# Patient Record
Sex: FEMALE | Race: WHITE | NOT HISPANIC OR LATINO | URBAN - METROPOLITAN AREA
[De-identification: names, ages, dates, MRNs, and addresses within clinical notes are randomized per-mention and may not be internally consistent; named-entity substitution may affect disease eponyms.]

---

## 2019-12-30 PROBLEM — Z00.00 ENCOUNTER FOR PREVENTIVE HEALTH EXAMINATION: Status: ACTIVE | Noted: 2019-12-30

## 2022-06-13 ENCOUNTER — OUTPATIENT (OUTPATIENT)
Dept: OUTPATIENT SERVICES | Facility: HOSPITAL | Age: 72
LOS: 1 days | Discharge: HOME | End: 2022-06-13
Payer: MEDICARE

## 2022-06-13 ENCOUNTER — RESULT REVIEW (OUTPATIENT)
Age: 72
End: 2022-06-13

## 2022-06-13 DIAGNOSIS — M25.512 PAIN IN LEFT SHOULDER: ICD-10-CM

## 2022-06-13 PROCEDURE — 73030 X-RAY EXAM OF SHOULDER: CPT | Mod: 26,LT

## 2022-06-13 PROCEDURE — 73080 X-RAY EXAM OF ELBOW: CPT | Mod: 26,LT

## 2022-06-17 ENCOUNTER — OUTPATIENT (OUTPATIENT)
Dept: OUTPATIENT SERVICES | Facility: HOSPITAL | Age: 72
LOS: 1 days | Discharge: HOME | End: 2022-06-17
Payer: MEDICARE

## 2022-06-17 ENCOUNTER — RESULT REVIEW (OUTPATIENT)
Age: 72
End: 2022-06-17

## 2022-06-17 DIAGNOSIS — M25.512 PAIN IN LEFT SHOULDER: ICD-10-CM

## 2022-06-17 DIAGNOSIS — S43.015A ANTERIOR DISLOCATION OF LEFT HUMERUS, INITIAL ENCOUNTER: ICD-10-CM

## 2022-06-17 PROCEDURE — 73221 MRI JOINT UPR EXTREM W/O DYE: CPT | Mod: 26,LT,MH

## 2022-07-14 ENCOUNTER — OUTPATIENT (OUTPATIENT)
Dept: OUTPATIENT SERVICES | Facility: HOSPITAL | Age: 72
LOS: 1 days | Discharge: HOME | End: 2022-07-14

## 2022-07-14 ENCOUNTER — RESULT REVIEW (OUTPATIENT)
Age: 72
End: 2022-07-14

## 2022-07-14 DIAGNOSIS — M25.512 PAIN IN LEFT SHOULDER: ICD-10-CM

## 2022-07-14 PROCEDURE — 73030 X-RAY EXAM OF SHOULDER: CPT | Mod: 26,LT

## 2022-07-20 ENCOUNTER — APPOINTMENT (OUTPATIENT)
Dept: ORTHOPEDIC SURGERY | Facility: CLINIC | Age: 72
End: 2022-07-20

## 2022-07-26 ENCOUNTER — APPOINTMENT (OUTPATIENT)
Dept: ORTHOPEDIC SURGERY | Facility: CLINIC | Age: 72
End: 2022-07-26

## 2022-07-26 VITALS — BODY MASS INDEX: 17.75 KG/M2 | WEIGHT: 104 LBS | HEIGHT: 64 IN

## 2022-07-26 PROCEDURE — 99203 OFFICE O/P NEW LOW 30 MIN: CPT

## 2022-07-26 NOTE — IMAGING
[de-identified] : L elbow:\par Tender at the ulna nerve\par Pain with flexion\par +tinels at the ulna nerve\par +hyperflexion test\par \par L hand:\par Intrinsic weakness\par Decreased sensation in the ulna nerve distribution\par

## 2022-07-26 NOTE — HISTORY OF PRESENT ILLNESS
[de-identified] : Patient comes in with complaints of numbness and tingling in the left hand.  She says that the small finger and the ring finger have significant numbness.  She says it is numb nearly all the time.  She had a fall at the beginning of June when she had a fracture dislocation of her shoulder but also hit her elbow.  Since that time she has had the problem.  She is seeing somebody for her shoulder ready.  She just was removed from the sling 2 days ago.  She has been in the sling for almost 2 months. [FreeTextEntry3] : 6/5/2022 [FreeTextEntry5] : [place] city [place] in an injury to her left shoulder, arm & hand

## 2022-07-26 NOTE — ASSESSMENT
[FreeTextEntry1] : The patient was advised of the diagnosis.  The natural history of the pathology was explained in full to the patient in layman's terms. We discussed the nature of the nerve as an electrical cable and what happens to the nerve in cubitaal tunnel syndrome.  We discussed that treatment for night symptoms included night bracing.  We discussed the use of nerve testing in cases when diagnosis was in doubt or for confirmation to exclude alternate pathology.  We discussed that if symptoms were 24/7 surgery was recommended to give the nerve the best chance to recover but that once symptoms were constant, the nerve may not recover even with surgery.  We discussed that if left alone the nerve progression could worsen and that treatment was indicated to prevent progression of nerve compression.  The longer the nerve is left, the more likely to cause worsening irreversible damage.  All questions were answered.  The risks and benefits of surgical and non-surgical treatment alternatives were explained in full to the patient.\par   I would like the patient get an EMG NCV.  When she gets the EMG NCV she will return.  Most likely will move forward with surgical intervention however I would like to see the results.  Patient recently was taking out of her sling so she is going to make some positional modifications as well.

## 2022-07-29 ENCOUNTER — APPOINTMENT (OUTPATIENT)
Dept: NEUROLOGY | Facility: CLINIC | Age: 72
End: 2022-07-29

## 2022-07-29 PROCEDURE — 95912 NRV CNDJ TEST 11-12 STUDIES: CPT

## 2022-07-29 PROCEDURE — 95886 MUSC TEST DONE W/N TEST COMP: CPT

## 2022-08-09 ENCOUNTER — APPOINTMENT (OUTPATIENT)
Dept: ORTHOPEDIC SURGERY | Facility: CLINIC | Age: 72
End: 2022-08-09

## 2022-08-19 ENCOUNTER — APPOINTMENT (OUTPATIENT)
Dept: ORTHOPEDIC SURGERY | Facility: CLINIC | Age: 72
End: 2022-08-19

## 2022-08-19 PROCEDURE — 99214 OFFICE O/P EST MOD 30 MIN: CPT

## 2022-08-25 ENCOUNTER — RESULT REVIEW (OUTPATIENT)
Age: 72
End: 2022-08-25

## 2022-08-25 ENCOUNTER — OUTPATIENT (OUTPATIENT)
Dept: OUTPATIENT SERVICES | Facility: HOSPITAL | Age: 72
LOS: 1 days | Discharge: HOME | End: 2022-08-25

## 2022-08-25 DIAGNOSIS — R06.02 SHORTNESS OF BREATH: ICD-10-CM

## 2022-08-25 DIAGNOSIS — M25.512 PAIN IN LEFT SHOULDER: ICD-10-CM

## 2022-08-25 PROCEDURE — 73030 X-RAY EXAM OF SHOULDER: CPT | Mod: 26,LT

## 2022-08-25 PROCEDURE — 71046 X-RAY EXAM CHEST 2 VIEWS: CPT | Mod: 26

## 2022-08-29 NOTE — HISTORY OF PRESENT ILLNESS
[] : yes [de-identified] : Patient comes back for follow-up of her cubital tunnel symptoms.  She had an EMG NCV.  She still has the same complaints. [FreeTextEntry3] : 6/5/2022 [FreeTextEntry5] : patient [place] the city resulting in an injury to her left shoulder, arm & hand [de-identified] : EMG

## 2022-08-29 NOTE — IMAGING
[de-identified] : L elbow:\par Tender at the ulna nerve\par Pain with flexion\par +tinels at the ulna nerve\par +hyperflexion test\par \par L hand:\par Intrinsic weakness\par Decreased sensation in the ulna nerve distribution\par

## 2022-08-29 NOTE — ASSESSMENT
[FreeTextEntry1] : We discussed the recommendation for cubital tunnel surgery- We reviewed that the goal of surgery is to prevent worsening and give the nerve a chance to recover.  If symptoms are constant there is a chance that the nerve is already too badly damaged and no longer has the potential to recover.  In addition the nerve recovers at the rate of an inch per month so recovery of hand function from compression of a nerve at the elbow can take many months to recover.  Motor end plates may degenerate prior to nerve recovery and therefore strength and atrophy may never recover.  Risks, benefits, and alternatives of surgery discussed with patient (and family). Risks including but not limited to infection, blood loss, tendon damage, muscle damage, nerve damage, stiffness, pain, no resolution of symptoms, CRPS, loss of function, potential for secondary surgery, loss of limb or life. Patient understands and was allowed to voice questions or concerns. They agree to surgery\par

## 2022-09-12 ENCOUNTER — APPOINTMENT (OUTPATIENT)
Dept: ORTHOPEDIC SURGERY | Facility: AMBULATORY SURGERY CENTER | Age: 72
End: 2022-09-12

## 2022-09-12 PROCEDURE — 15736 MUSCLE-SKIN GRAFT ARM: CPT | Mod: LT

## 2022-09-12 PROCEDURE — 64718 REVISE ULNAR NERVE AT ELBOW: CPT | Mod: LT

## 2022-09-21 ENCOUNTER — APPOINTMENT (OUTPATIENT)
Dept: PEDIATRIC ALLERGY IMMUNOLOGY | Facility: CLINIC | Age: 72
End: 2022-09-21

## 2022-09-21 VITALS — HEIGHT: 64 IN | WEIGHT: 104 LBS | BODY MASS INDEX: 17.75 KG/M2

## 2022-09-21 PROCEDURE — 95004 PERQ TESTS W/ALRGNC XTRCS: CPT

## 2022-09-21 PROCEDURE — 99203 OFFICE O/P NEW LOW 30 MIN: CPT | Mod: 25

## 2022-09-21 NOTE — SOCIAL HISTORY
[Apartment] : [unfilled] lives in an apartment  [Radiator/Baseboard] : heating provided by radiator(s)/baseboard(s) [Window Units] : air conditioning provided by window units [Dry] : dry [None] : none [Humidifier] : does not use a humidifier [Dehumidifier] : does not use a dehumidifier [Cockroaches] : Patient states that there are no cockroaches in the home [Dust Mite Covers] : does not have dust mite covers [Feather Pillows] : does not have feather pillows [Feather Comforter] : does not have a feather comforter [Bedroom] : not in the bedroom [Basement] : not in the basement [Living Area] : not in the living area [Smokers in Household] : there are no smokers in the home

## 2022-09-21 NOTE — HISTORY OF PRESENT ILLNESS
[de-identified] : RHONDA WOODRUFF is a 72 year  old female with a history of mild sporadic asthma in childhood. SHe used to have albuterol and no seasonal allergies at that point. At age 31, after she had her first child she developed upper respiratory allergies in the spring and fall and some exercised induced shortness of breath. It is notable she has never taken any antihistamine regularly.  Several years later she had an allergy workup and she went on allergy shots for 15 years. She got better and immunotherapy was discontinued by her mid 40s. Five to six years later in her 50s she went to Dr. Celeste and she was evaluated and began immunotherapy for various environmental allergens again. In the last 2-3 years her immunotherapy is a bit irregular since the pandemic began, she was going every 3 weeks up until 2 and half months ago.\par \par She also has sensitivity to chemicals and she almost had a systemic reaction. And she has an EpiPen she carries with her. She gets lip swelling with papaya and cantaloupe. She gets tongue swelling with Advil, and hives with penicillin and sulfur based drugs. Her EpiPen expires in November.\par \par She is here for evaluation of her allergy status.

## 2022-09-21 NOTE — PHYSICAL EXAM
[Alert] : alert [Well Nourished] : well nourished [Healthy Appearance] : healthy appearance [No Acute Distress] : no acute distress [Well Developed] : well developed [Normal Pupil & Iris Size/Symmetry] : normal pupil and iris size and symmetry [No Discharge] : no discharge [No Photophobia] : no photophobia [Sclera Not Icteric] : sclera not icteric [Normal TMs] : both tympanic membranes were normal [Normal Nasal Mucosa] : the nasal mucosa was normal [Normal Lips/Tongue] : the lips and tongue were normal [Normal Outer Ear/Nose] : the ears and nose were normal in appearance [Normal Tonsils] : normal tonsils [No Thrush] : no thrush [Pale mucosa] : pale mucosa [Boggy Nasal Turbinates] : boggy and/or pale nasal turbinates [Clear Rhinorrhea] : clear rhinorrhea was seen [Supple] : the neck was supple [Normal Rate and Effort] : normal respiratory rhythm and effort [No Crackles] : no crackles [No Retractions] : no retractions [Bilateral Audible Breath Sounds] : bilateral audible breath sounds [Normal Rate] : heart rate was normal  [Normal S1, S2] : normal S1 and S2 [No murmur] : no murmur [Regular Rhythm] : with a regular rhythm [Soft] : abdomen soft [Not Tender] : non-tender [Not Distended] : not distended [No HSM] : no hepato-splenomegaly [Normal Cervical Lymph Nodes] : cervical [Skin Intact] : skin intact  [No Rash] : no rash [No Skin Lesions] : no skin lesions [No clubbing] : no clubbing [No Edema] : no edema [No Cyanosis] : no cyanosis [Normal Mood] : mood was normal [Normal Affect] : affect was normal [Alert, Awake, Oriented as Age-Appropriate] : alert, awake, oriented as age appropriate

## 2022-09-21 NOTE — ASSESSMENT
[FreeTextEntry1] : The patient is a 72 year old female with a decades long history of Allergic Rhinitis. Allergy skin testing showed she still had various positive reactions to environmental allergens. I have suggested she implement environmental control measures including HEPA air purifier and dust mite proof encasings. I have suggested symptomatic treatment with OTC Zyrtec or Allegra. I explained to the patient that if her symptoms get much worse we will consider reintroducing immunotherapy at a later date. I will see her in April or as needed.

## 2022-09-23 ENCOUNTER — APPOINTMENT (OUTPATIENT)
Dept: ORTHOPEDIC SURGERY | Facility: CLINIC | Age: 72
End: 2022-09-23

## 2022-09-23 PROCEDURE — 99024 POSTOP FOLLOW-UP VISIT: CPT

## 2022-09-23 NOTE — HISTORY OF PRESENT ILLNESS
[] : Post Surgical Visit: yes [de-identified] : Patient comes in status post left cubital tunnel release with anterior transposition in adipose fascial fat flap.  She says she has some stiffness in the arm she has some swelling in the hand she has some discomfort and some pain.  The pain is okay right now.  The pain is improving.  She still has the same numbness she had prior to surgery. [de-identified] : 09/12/2022 [de-identified] : left cubital tunnel release

## 2022-09-23 NOTE — ASSESSMENT
[FreeTextEntry1] :  status post cubital tunnel release.  The patient is going to start with scar massage.  She is not going to do excessive bending of the elbow nor she going to lift anything heavy.  She is going to continue with therapy for her shoulder.  I discussed with her can take at least a year for the sensation to improving may never fully improved but purpose of the surgery was to stop it from getting worse to stop her from having atrophy.  The patient understood everything thoroughly.  I will see her in 3 weeks.

## 2022-09-23 NOTE — IMAGING
[de-identified] :   Incision site clean dry and intact, mild ecchymosis, sutures removed, full range of motion of fingers, neurovascular status unchanged

## 2022-10-14 ENCOUNTER — APPOINTMENT (OUTPATIENT)
Dept: ORTHOPEDIC SURGERY | Facility: CLINIC | Age: 72
End: 2022-10-14

## 2022-10-14 DIAGNOSIS — T78.2XXA ANAPHYLACTIC SHOCK, UNSPECIFIED, INITIAL ENCOUNTER: ICD-10-CM

## 2022-10-14 PROCEDURE — 99024 POSTOP FOLLOW-UP VISIT: CPT

## 2022-10-14 NOTE — REASON FOR VISIT
[FreeTextEntry2] : PO 9/12/22 status post left cubital tunnel release with anterior transposition in adipose fascial fat flap

## 2022-10-14 NOTE — PHYSICAL EXAM
[de-identified] :   Incision site well healed, slight sensitivity by the incision site, good range of motion of elbow, neurovascular status improving

## 2022-10-14 NOTE — HISTORY OF PRESENT ILLNESS
[de-identified] : Patient comes in status post left cubital tunnel release with anterior transposition in adipose fascial fat flap.  She says her pain is significantly better than prior.  She notices that sometimes she has more swelling in the hand than others.  She has some sensitivity by the incision site.  The numbness has improved but is still there.  She is currently doing therapy on her shoulder. [] : Post Surgical Visit: yes [de-identified] : 09/12/2022 [de-identified] : left cubital tunnel release

## 2022-10-14 NOTE — IMAGING
[de-identified] :   Incision site clean dry and intact, mild ecchymosis, sutures removed, full range of motion of fingers, neurovascular status unchanged

## 2022-10-14 NOTE — ASSESSMENT
[FreeTextEntry1] :  patient is status post cubital tunnel release.  I am recommending that she go to therapy.  She did have an anterior transposition with a fat flap.  I am recommending she go to occupational therapist to work on nerve gliding, scar desensitization and massage, intrinsic exercises of her hand in strengthening.  I do not recommend she lifts anything heavy for the next month.  She may continue with therapy for the shoulder.  I will see her in about 6 weeks.

## 2022-11-29 ENCOUNTER — APPOINTMENT (OUTPATIENT)
Dept: ORTHOPEDIC SURGERY | Facility: CLINIC | Age: 72
End: 2022-11-29

## 2022-11-29 PROCEDURE — 99024 POSTOP FOLLOW-UP VISIT: CPT

## 2022-11-29 NOTE — ASSESSMENT
[FreeTextEntry1] :  patient is status post cubital tunnel release.  I am recommending that she go to therapy.   I did discuss with the patient that she should be doing desensitization and nerve gliding exercises in addition to intrinsic lb strengthening.  I gave a new prescription for therapy.  Hopefully this will help therapist guide her little bit better.  As for lifting heavy things, I discussed with her not lifting anything too heavy, she has not been lifting anything much at all and has just tried 1 or 2 lb weights.  Her physical therapist asked specifically what she can do but did not bring a protocol this time so that I could tell her what was something that was not okay.  I recommended that she have her therapist give me a call or send over the specific so that I can let her know what would be okay or not okay.  She will  Follow up when she is back from her vacation at the end of January.

## 2022-11-29 NOTE — HISTORY OF PRESENT ILLNESS
[de-identified] : Patient comes in status post left cubital tunnel release with anterior transposition in adipose fascial fat flap.  She says she still having sensitivity at the elbow.  She still has numbness in the hand.  She has been working with therapy but have not been doing nerve gliding exercises nor desensitization.  They have been doing specific hand movements and tasks. [] : Post Surgical Visit: yes [de-identified] : 09/12/2022 [de-identified] : left cubital tunnel release

## 2022-11-29 NOTE — REASON FOR VISIT
[FreeTextEntry2] : PO 9/12/22 status post left cubital tunnel release with anterior transposition in adipose fascial fat flap.

## 2022-11-29 NOTE — PHYSICAL EXAM
[de-identified] :   Incision site well healed, slight sensitivity by the incision site, good range of motion of elbow, neurovascular status improving

## 2022-11-29 NOTE — IMAGING
[de-identified] :   Incision site clean dry and intact, mild ecchymosis, sutures removed, full range of motion of fingers, neurovascular status unchanged

## 2023-01-30 ENCOUNTER — APPOINTMENT (OUTPATIENT)
Dept: ORTHOPEDIC SURGERY | Facility: CLINIC | Age: 73
End: 2023-01-30
Payer: MEDICARE

## 2023-01-30 PROCEDURE — 99213 OFFICE O/P EST LOW 20 MIN: CPT

## 2023-01-30 NOTE — IMAGING
[de-identified] :   Incision site   Well-healed, full range of motion of fingers, wrist and elbow, neurovascular status unchanged

## 2023-01-30 NOTE — HISTORY OF PRESENT ILLNESS
[] : Post Surgical Visit: yes [de-identified] : Patient comes in status post left cubital tunnel release with anterior transposition in adipose fascial fat flap. She still has numbness in the hand.  She says she still having sensitivity at the elbow. She does not have any other complaints.She is still going to therapy. She says that she does not have any more pain.\par  [de-identified] : 09/12/2022 [de-identified] : left cubital tunnel release

## 2023-01-30 NOTE — PHYSICAL EXAM
[de-identified] :   Incision site well healed, slight sensitivity by the incision site, good range of motion of elbow, neurovascular status improving

## 2023-01-30 NOTE — ASSESSMENT
[FreeTextEntry1] :  patient is status post cubital tunnel release.  the patient has not done therapy in some time.  She still has some hypersensitivity by the elbow.  She says she does not have any more pain.  She will continue with therapy.  She will follow up in about 2-3 months.  We have time to see if the sensation will or will not return.

## 2023-03-22 ENCOUNTER — APPOINTMENT (OUTPATIENT)
Dept: ORTHOPEDIC SURGERY | Facility: CLINIC | Age: 73
End: 2023-03-22
Payer: MEDICARE

## 2023-03-22 DIAGNOSIS — G56.22 LESION OF ULNAR NERVE, LEFT UPPER LIMB: ICD-10-CM

## 2023-03-22 PROCEDURE — 99213 OFFICE O/P EST LOW 20 MIN: CPT

## 2023-03-22 NOTE — ASSESSMENT
[FreeTextEntry1] :  patient is status post cubital tunnel release.  She says she does not have any more pain.  She will continue with therapy for her shoulder but in terms of her elbow and hand is doing everything she needs.  We have time to see if the sensation will or will not return. She will let pain be her guide regarding therapy for her shoulder.  If she has a problem with her elbow she will let me know.

## 2023-03-22 NOTE — HISTORY OF PRESENT ILLNESS
[] : Post Surgical Visit: yes [de-identified] : Patient comes in status post left cubital tunnel release with anterior transposition in adipose fascial fat flap. She still has numbness in the hand.  She says the sensitivity comes and goes at the elbow but it is better than prior.  She says sometimes when she lifts things she does have some pain but most of the pain is in the shoulder.  She is currently being treated for a proximal humerus fracture rotator cuff tears and a frozen shoulder.  She has stopped doing therapy and doing things on her own. [de-identified] : 09/12/2022 [de-identified] : left cubital tunnel release

## 2023-03-22 NOTE — PHYSICAL EXAM
[de-identified] :   Incision site well healed, no sensitivity by the incision site, good range of motion of elbow, neurovascular status improved but no change or improvement since last visit

## 2023-04-05 ENCOUNTER — APPOINTMENT (OUTPATIENT)
Dept: PEDIATRIC ALLERGY IMMUNOLOGY | Facility: CLINIC | Age: 73
End: 2023-04-05
Payer: MEDICARE

## 2023-04-05 PROCEDURE — 99213 OFFICE O/P EST LOW 20 MIN: CPT

## 2023-04-05 NOTE — ASSESSMENT
[FreeTextEntry1] : The patient is a 72 year old female with a decades long history of Allergic Rhinitis. Doing very well. I have suggested she continue symptomatic treatment with OTC Zyrtec or Allegra.  I will see her in 3 months or as needed.

## 2023-04-05 NOTE — HISTORY OF PRESENT ILLNESS
[de-identified] : RHONDA WOODRUFF is a 72 year  old female with a history of mild sporadic asthma in childhood. SHe used to have albuterol and no seasonal allergies at that point. At age 31, after she had her first child she developed upper respiratory allergies in the spring and fall and some exercised induced shortness of breath. It is notable she has never taken any antihistamine regularly.  Several years later she had an allergy workup and she went on allergy shots for 15 years. She got better and immunotherapy was discontinued by her mid 40s. Five to six years later in her 50s she went to Dr. Celeste and she was evaluated and began immunotherapy for various environmental allergens again. In the last 2-3 years her immunotherapy is a bit irregular since the pandemic began, she was going every 3 weeks up until 2 and half months ago.\par \par She also has sensitivity to chemicals and she almost had a systemic reaction. And she has an EpiPen she carries with her. She gets lip swelling with papaya and cantaloupe. She gets tongue swelling with Advil, and hives with penicillin and sulfur based drugs. Her EpiPen expires in November.\par \par Allergy skin testing showed she still had various positive reactions to environmental allergens. I had suggested she implement environmental control measures including HEPA air purifier and dust mite proof encasings. I had suggested symptomatic treatment with OTC Zyrtec or Allegra. I explained to the patient that if her symptoms get much worse we will consider reintroducing immunotherapy at a later date.

## 2023-05-31 ENCOUNTER — APPOINTMENT (OUTPATIENT)
Dept: PEDIATRIC ALLERGY IMMUNOLOGY | Facility: CLINIC | Age: 73
End: 2023-05-31
Payer: MEDICARE

## 2023-05-31 VITALS
SYSTOLIC BLOOD PRESSURE: 100 MMHG | WEIGHT: 104 LBS | HEIGHT: 64 IN | DIASTOLIC BLOOD PRESSURE: 70 MMHG | BODY MASS INDEX: 17.75 KG/M2

## 2023-05-31 PROCEDURE — 99213 OFFICE O/P EST LOW 20 MIN: CPT

## 2023-05-31 NOTE — HISTORY OF PRESENT ILLNESS
[None] : The patient is currently asymptomatic [de-identified] : RHONDA WOODRUFF is a 73 year  old female with a history of mild sporadic asthma in childhood. SHe used to have albuterol and no seasonal allergies at that point. At age 31, after she had her first child she developed upper respiratory allergies in the spring and fall and some exercised induced shortness of breath. It is notable she has never taken any antihistamine regularly.  Several years later she had an allergy workup and she went on allergy shots for 15 years. She got better and immunotherapy was discontinued by her mid 40s. Five to six years later in her 50s she went to Dr. Celeste and she was evaluated and began immunotherapy for various environmental allergens again. In the last 2-3 years her immunotherapy is a bit irregular since the pandemic began, she was going every 3 weeks up until 2 and half months ago.\par \par She also has sensitivity to chemicals and she almost had a systemic reaction. And she has an EpiPen she carries with her. She gets lip swelling with papaya and cantaloupe. She gets tongue swelling with Advil, and hives with penicillin and sulfur based drugs. Her EpiPen expires in November.\par \par Allergy skin testing showed she still had various positive reactions to environmental allergens. I had suggested she implement environmental control measures including HEPA air purifier and dust mite proof encasings. I had suggested symptomatic treatment with OTC Zyrtec or Allegra. I explained to the patient that if her symptoms get much worse we will consider reintroducing immunotherapy at a later date.\par \par She has been doing well with some sneezing and runny nose in the morning.

## 2023-11-15 ENCOUNTER — APPOINTMENT (OUTPATIENT)
Dept: PEDIATRIC ALLERGY IMMUNOLOGY | Facility: CLINIC | Age: 73
End: 2023-11-15
Payer: MEDICARE

## 2023-11-15 PROCEDURE — 99213 OFFICE O/P EST LOW 20 MIN: CPT

## 2023-12-27 ENCOUNTER — APPOINTMENT (OUTPATIENT)
Dept: ORTHOPEDIC SURGERY | Facility: CLINIC | Age: 73
End: 2023-12-27
Payer: MEDICARE

## 2023-12-27 VITALS — BODY MASS INDEX: 18.44 KG/M2 | HEIGHT: 64 IN | WEIGHT: 108 LBS

## 2023-12-27 DIAGNOSIS — S61.219A LACERATION W/OUT FOREIGN BODY OF UNSPECIFIED FINGER W/OUT DAMAGE TO NAIL, INITIAL ENCOUNTER: ICD-10-CM

## 2023-12-27 PROCEDURE — 99213 OFFICE O/P EST LOW 20 MIN: CPT

## 2023-12-27 NOTE — DISCUSSION/SUMMARY
[de-identified] : Impression: Laceration to the left middle finger with mild soft tissue swelling.  Plan:  Patient was advised for leilani taping to promote range of motion to the digit. Patient was advised to keep the area clean and dry. Patient was advised to follow-up in 1 week for repeat evaluation.  Follow-up:

## 2023-12-27 NOTE — IMAGING
[de-identified] : On examination of the the left middle finger, patient has a small superficial laceration over the proximal phalanx, there is no significant redness surrounding the laceration. There is no warmth to palpation, patient has generalized swelling over the proximal phalanx over the soft tissue. Patient is able to flex the MCP and PIP joint, she may have some stiffness possibly due to swelling. Sensation to light touch intact.

## 2023-12-27 NOTE — HISTORY OF PRESENT ILLNESS
[de-identified] : 73-year-old female here for an evaluation with stent to the left middle finger. Patient states that the senior happened on December 25, 2023, patient states that she Cut with a piece of glass, at the time of the injury she applied peroxide and bacitracin and after that she tried to squeeze her finger to see if any glass inside her cut. NothingPatient states that the following day she woke up with swelling in her finger and difficulty bending her finger. Patient is afraid of any nerve or tendon damage

## 2024-01-03 ENCOUNTER — APPOINTMENT (OUTPATIENT)
Dept: ORTHOPEDIC SURGERY | Facility: CLINIC | Age: 74
End: 2024-01-03

## 2024-01-16 ENCOUNTER — APPOINTMENT (OUTPATIENT)
Dept: ORTHOPEDIC SURGERY | Facility: CLINIC | Age: 74
End: 2024-01-16

## 2024-03-05 ENCOUNTER — APPOINTMENT (OUTPATIENT)
Dept: ORTHOPEDIC SURGERY | Facility: CLINIC | Age: 74
End: 2024-03-05
Payer: MEDICARE

## 2024-03-05 DIAGNOSIS — S69.82XA OTHER SPECIFIED INJURIES OF LEFT WRIST, HAND AND FINGER(S), INITIAL ENCOUNTER: ICD-10-CM

## 2024-03-05 PROCEDURE — L3908: CPT | Mod: KX,LT

## 2024-03-05 PROCEDURE — 73110 X-RAY EXAM OF WRIST: CPT | Mod: LT

## 2024-03-05 PROCEDURE — 99214 OFFICE O/P EST MOD 30 MIN: CPT

## 2024-03-05 NOTE — ASSESSMENT
[FreeTextEntry1] : The patient comes in with pain and swelling in her left wrist. The patient states about 2 months ago she woke up with swelling. The patient states she thinks she lifted something heavy the day prior, but she is not very sure. The patient states she has pain when she is making her bed.   R wrist: Swelling, volar wrist, tender palpation over ECU, pain with resisted flexion of the wrist, full range of motion of wrist, minimally tender palpation along basal joint, neurovascular intact  X-RAY left wrist 3 views   We reviewed the anatomy of the 6th dorsal extensor compartment and pathology of ECU tendonitis. We discussed the treatment options including splinting/nsaids, injection and surgery. The patient was given a cock-up brace to wear. The patient will follow up in a month.

## 2024-03-07 PROBLEM — S69.82XA TFCC (TRIANGULAR FIBROCARTILAGE COMPLEX) INJURY, LEFT, INITIAL ENCOUNTER: Status: ACTIVE | Noted: 2024-03-07

## 2024-04-03 ENCOUNTER — APPOINTMENT (OUTPATIENT)
Dept: ORTHOPEDIC SURGERY | Facility: CLINIC | Age: 74
End: 2024-04-03

## 2024-05-20 ENCOUNTER — OUTPATIENT (OUTPATIENT)
Dept: OUTPATIENT SERVICES | Facility: HOSPITAL | Age: 74
LOS: 1 days | End: 2024-05-20
Payer: MEDICARE

## 2024-05-20 VITALS
HEIGHT: 64 IN | RESPIRATION RATE: 16 BRPM | OXYGEN SATURATION: 100 % | TEMPERATURE: 98 F | HEART RATE: 60 BPM | DIASTOLIC BLOOD PRESSURE: 77 MMHG | SYSTOLIC BLOOD PRESSURE: 130 MMHG | WEIGHT: 106.04 LBS

## 2024-05-20 DIAGNOSIS — M20.41 OTHER HAMMER TOE(S) (ACQUIRED), RIGHT FOOT: ICD-10-CM

## 2024-05-20 DIAGNOSIS — Z01.818 ENCOUNTER FOR OTHER PREPROCEDURAL EXAMINATION: ICD-10-CM

## 2024-05-20 DIAGNOSIS — S42.402A UNSPECIFIED FRACTURE OF LOWER END OF LEFT HUMERUS, INITIAL ENCOUNTER FOR CLOSED FRACTURE: Chronic | ICD-10-CM

## 2024-05-20 LAB
ALBUMIN SERPL ELPH-MCNC: 4.5 G/DL — SIGNIFICANT CHANGE UP (ref 3.5–5.2)
ALP SERPL-CCNC: 128 U/L — HIGH (ref 30–115)
ALT FLD-CCNC: 17 U/L — SIGNIFICANT CHANGE UP (ref 0–41)
ANION GAP SERPL CALC-SCNC: 12 MMOL/L — SIGNIFICANT CHANGE UP (ref 7–14)
AST SERPL-CCNC: 23 U/L — SIGNIFICANT CHANGE UP (ref 0–41)
BASOPHILS # BLD AUTO: 0.03 K/UL — SIGNIFICANT CHANGE UP (ref 0–0.2)
BASOPHILS NFR BLD AUTO: 0.7 % — SIGNIFICANT CHANGE UP (ref 0–1)
BILIRUB SERPL-MCNC: 0.3 MG/DL — SIGNIFICANT CHANGE UP (ref 0.2–1.2)
BUN SERPL-MCNC: 17 MG/DL — SIGNIFICANT CHANGE UP (ref 10–20)
CALCIUM SERPL-MCNC: 9.5 MG/DL — SIGNIFICANT CHANGE UP (ref 8.4–10.5)
CHLORIDE SERPL-SCNC: 97 MMOL/L — LOW (ref 98–110)
CO2 SERPL-SCNC: 26 MMOL/L — SIGNIFICANT CHANGE UP (ref 17–32)
CREAT SERPL-MCNC: 0.8 MG/DL — SIGNIFICANT CHANGE UP (ref 0.7–1.5)
EGFR: 78 ML/MIN/1.73M2 — SIGNIFICANT CHANGE UP
EOSINOPHIL # BLD AUTO: 0.1 K/UL — SIGNIFICANT CHANGE UP (ref 0–0.7)
EOSINOPHIL NFR BLD AUTO: 2.3 % — SIGNIFICANT CHANGE UP (ref 0–8)
GLUCOSE SERPL-MCNC: 79 MG/DL — SIGNIFICANT CHANGE UP (ref 70–99)
HCT VFR BLD CALC: 36 % — LOW (ref 37–47)
HGB BLD-MCNC: 11.9 G/DL — LOW (ref 12–16)
IMM GRANULOCYTES NFR BLD AUTO: 0.2 % — SIGNIFICANT CHANGE UP (ref 0.1–0.3)
LYMPHOCYTES # BLD AUTO: 0.77 K/UL — LOW (ref 1.2–3.4)
LYMPHOCYTES # BLD AUTO: 18.1 % — LOW (ref 20.5–51.1)
MCHC RBC-ENTMCNC: 26.4 PG — LOW (ref 27–31)
MCHC RBC-ENTMCNC: 33.1 G/DL — SIGNIFICANT CHANGE UP (ref 32–37)
MCV RBC AUTO: 80 FL — LOW (ref 81–99)
MONOCYTES # BLD AUTO: 0.57 K/UL — SIGNIFICANT CHANGE UP (ref 0.1–0.6)
MONOCYTES NFR BLD AUTO: 13.4 % — HIGH (ref 1.7–9.3)
NEUTROPHILS # BLD AUTO: 2.78 K/UL — SIGNIFICANT CHANGE UP (ref 1.4–6.5)
NEUTROPHILS NFR BLD AUTO: 65.3 % — SIGNIFICANT CHANGE UP (ref 42.2–75.2)
NRBC # BLD: 0 /100 WBCS — SIGNIFICANT CHANGE UP (ref 0–0)
PLATELET # BLD AUTO: 166 K/UL — SIGNIFICANT CHANGE UP (ref 130–400)
PMV BLD: 11.8 FL — HIGH (ref 7.4–10.4)
POTASSIUM SERPL-MCNC: 4.7 MMOL/L — SIGNIFICANT CHANGE UP (ref 3.5–5)
POTASSIUM SERPL-SCNC: 4.7 MMOL/L — SIGNIFICANT CHANGE UP (ref 3.5–5)
PROT SERPL-MCNC: 6.9 G/DL — SIGNIFICANT CHANGE UP (ref 6–8)
RBC # BLD: 4.5 M/UL — SIGNIFICANT CHANGE UP (ref 4.2–5.4)
RBC # FLD: 13.9 % — SIGNIFICANT CHANGE UP (ref 11.5–14.5)
SODIUM SERPL-SCNC: 135 MMOL/L — SIGNIFICANT CHANGE UP (ref 135–146)
WBC # BLD: 4.26 K/UL — LOW (ref 4.8–10.8)
WBC # FLD AUTO: 4.26 K/UL — LOW (ref 4.8–10.8)

## 2024-05-20 PROCEDURE — 80053 COMPREHEN METABOLIC PANEL: CPT

## 2024-05-20 PROCEDURE — 99214 OFFICE O/P EST MOD 30 MIN: CPT | Mod: 25

## 2024-05-20 PROCEDURE — 36415 COLL VENOUS BLD VENIPUNCTURE: CPT

## 2024-05-20 PROCEDURE — 93005 ELECTROCARDIOGRAM TRACING: CPT

## 2024-05-20 PROCEDURE — 93010 ELECTROCARDIOGRAM REPORT: CPT

## 2024-05-20 PROCEDURE — 85025 COMPLETE CBC W/AUTO DIFF WBC: CPT

## 2024-05-20 NOTE — H&P PST ADULT - NSANTHOSAYNRD_GEN_A_CORE
No. SCHUYLER screening performed.  STOP BANG Legend: 0-2 = LOW Risk; 3-4 = INTERMEDIATE Risk; 5-8 = HIGH Risk

## 2024-05-20 NOTE — H&P PST ADULT - NSICDXPASTMEDICALHX_GEN_ALL_CORE_FT
PAST MEDICAL HISTORY:  Dry eyes     Multiple closed and open fractures     Osteoporosis     Retinal tear, left     Toe fracture, right

## 2024-05-20 NOTE — H&P PST ADULT - REASON FOR ADMISSION
72 y/o female presents for PAST in preparation for Right second toe arthroplasty on 6/7/2024 [place] [facility] [place]. Devi.

## 2024-05-20 NOTE — H&P PST ADULT - NS MD HP INPLANTS MED DEV
Patient is requesting medication refill. Please approve or deny this request.    Rx requested:  Requested Prescriptions     Pending Prescriptions Disp Refills    oxyCODONE-acetaminophen (PERCOCET) 5-325 MG per tablet 60 tablet 0     Sig: Take 1 tablet by mouth 2 times daily for 30 days.          Last Office Visit:   6/17/2022      Next Visit Date:  Future Appointments   Date Time Provider Manan Honeycutt   2/3/2023  9:30 AM Merissa Camacho  [facility] -
None

## 2024-05-20 NOTE — H&P PST ADULT - HISTORY OF PRESENT ILLNESS
72 y/o female with a one year history of R toe fracture after fall from ladder,  experiences pain with closed shoes . Now scheduled for ight second toe arthroplasty on 6/7/2024 [facility] [place]. Devi.    Denies any chest pain, difficulty breathing, SOB, palpitations, dysuria, URI, or any other infections in the last 2 weeks/1 month. Denies any recent travel, contact, or exposure to any persons with known or suspected COVID-19. Pt also denies COVID testing within the last 2 weeks. Pt admits to receiving all doses of  COVID vaccine. Denies any suicidal or homicidal ideations. Pt advised to self quarantine until day of procedure. Exercise tolerance, does siva 4-5 times per week (1 hour) and is an active hiker, without dyspnea. Reviewed with patient. Pt verbalized understanding of all pre-operative instructions.    Anesthesia Alert  NO--Difficult Airway CLASS II  NO--History of neck surgery or radiation  NO--Limited ROM of neck  NO--History of Malignant hyperthermia  NO--No personal or family history of Pseudocholinesterase deficiency.  YES--Prior Anesthesia Complication Nausea/Vomiting  YES--Latex Allergy  NO--Loose teeth  NO--History of Rheumatoid Arthritis  NO--SCHUYLER  NO--Bleeding Risk  NO--Other_____    Revised Cardiac Risk Index for Pre-Operative Risk from [employer]  on 5/20/2024  ** All calculations should be rechecked by clinician prior to use **    RESULT SUMMARY:  0 points  Class I Risk    3.9 %  30-day risk of death, MI, or cardiac arrest    From Duceppe 2017. These numbers are higher than those from the original study (Pablo 1999). See Evidence for details.      INPUTS:  Elevated-risk surgery —> 0 = No  History of ischemic heart disease —> 0 = No  History of congestive heart failure —> 0 = No  History of cerebrovascular disease —> 0 = No  Pre-operative treatment with insulin —> 0 = No  Pre-operative creatinine >2 mg/dL / 176.8 µmol/L —> 0 = No    Duke Activity Status Index (DASI) from [employer]  on 5/20/2024  ** All calculations should be rechecked by clinician prior to use **    RESULT SUMMARY:  42.7 points  The higher the score (maximum 58.2), the higher the functional status.    7.99 METs        INPUTS:  Take care of self —> 2.75 = Yes  Walk indoors —> 1.75 = Yes  Walk 1&ndash;2 blocks on level ground —> 2.75 = Yes  Climb a flight of stairs or walk up a hill —> 5.5 = Yes  Run a short distance —> 8 = Yes  Do light work around the house —> 2.7 = Yes  Do moderate work around the house —> 3.5 = Yes  Do heavy work around the house —> 0 = No  Do yardwork —> 4.5 = Yes  Have sexual relations —> 5.25 = Yes  Participate in moderate recreational activities —> 6 = Yes  Participate in strenuous sports —> 0 = No

## 2024-05-21 DIAGNOSIS — Z01.818 ENCOUNTER FOR OTHER PREPROCEDURAL EXAMINATION: ICD-10-CM

## 2024-05-21 DIAGNOSIS — M20.41 OTHER HAMMER TOE(S) (ACQUIRED), RIGHT FOOT: ICD-10-CM

## 2024-06-07 ENCOUNTER — TRANSCRIPTION ENCOUNTER (OUTPATIENT)
Age: 74
End: 2024-06-07

## 2024-06-07 ENCOUNTER — RESULT REVIEW (OUTPATIENT)
Age: 74
End: 2024-06-07

## 2024-06-07 ENCOUNTER — OUTPATIENT (OUTPATIENT)
Dept: OUTPATIENT SERVICES | Facility: HOSPITAL | Age: 74
LOS: 1 days | Discharge: HOME | End: 2024-06-07
Payer: MEDICARE

## 2024-06-07 VITALS
SYSTOLIC BLOOD PRESSURE: 131 MMHG | HEIGHT: 64 IN | DIASTOLIC BLOOD PRESSURE: 79 MMHG | OXYGEN SATURATION: 100 % | TEMPERATURE: 98 F | RESPIRATION RATE: 17 BRPM | WEIGHT: 106.92 LBS | HEART RATE: 62 BPM

## 2024-06-07 VITALS — RESPIRATION RATE: 17 BRPM | HEART RATE: 60 BPM

## 2024-06-07 DIAGNOSIS — M20.41 OTHER HAMMER TOE(S) (ACQUIRED), RIGHT FOOT: ICD-10-CM

## 2024-06-07 DIAGNOSIS — Z88.0 ALLERGY STATUS TO PENICILLIN: ICD-10-CM

## 2024-06-07 DIAGNOSIS — S42.402A UNSPECIFIED FRACTURE OF LOWER END OF LEFT HUMERUS, INITIAL ENCOUNTER FOR CLOSED FRACTURE: Chronic | ICD-10-CM

## 2024-06-07 DIAGNOSIS — M81.0 AGE-RELATED OSTEOPOROSIS WITHOUT CURRENT PATHOLOGICAL FRACTURE: ICD-10-CM

## 2024-06-07 DIAGNOSIS — H04.129 DRY EYE SYNDROME OF UNSPECIFIED LACRIMAL GLAND: ICD-10-CM

## 2024-06-07 DIAGNOSIS — Z88.5 ALLERGY STATUS TO NARCOTIC AGENT: ICD-10-CM

## 2024-06-07 DIAGNOSIS — Z91.040 LATEX ALLERGY STATUS: ICD-10-CM

## 2024-06-07 DIAGNOSIS — Z88.2 ALLERGY STATUS TO SULFONAMIDES: ICD-10-CM

## 2024-06-07 PROCEDURE — 73630 X-RAY EXAM OF FOOT: CPT | Mod: RT

## 2024-06-07 PROCEDURE — 88304 TISSUE EXAM BY PATHOLOGIST: CPT

## 2024-06-07 PROCEDURE — 73630 X-RAY EXAM OF FOOT: CPT | Mod: 26,RT

## 2024-06-07 PROCEDURE — 88311 DECALCIFY TISSUE: CPT | Mod: 26

## 2024-06-07 PROCEDURE — 88311 DECALCIFY TISSUE: CPT

## 2024-06-07 PROCEDURE — 88304 TISSUE EXAM BY PATHOLOGIST: CPT | Mod: 26

## 2024-06-07 RX ORDER — SODIUM CHLORIDE 9 MG/ML
1000 INJECTION, SOLUTION INTRAVENOUS
Refills: 0 | Status: DISCONTINUED | OUTPATIENT
Start: 2024-06-07 | End: 2024-06-07

## 2024-06-07 RX ORDER — ONDANSETRON 8 MG/1
4 TABLET, FILM COATED ORAL ONCE
Refills: 0 | Status: DISCONTINUED | OUTPATIENT
Start: 2024-06-07 | End: 2024-06-07

## 2024-06-07 RX ORDER — ACETAMINOPHEN 500 MG
650 TABLET ORAL ONCE
Refills: 0 | Status: DISCONTINUED | OUTPATIENT
Start: 2024-06-07 | End: 2024-06-07

## 2024-06-07 RX ORDER — VITAMIN E 100 UNIT
0 CAPSULE ORAL
Refills: 0 | DISCHARGE

## 2024-06-07 NOTE — ASU DISCHARGE PLAN (ADULT/PEDIATRIC) - CARE PROVIDER_API CALL
Carlito Devi  Podiatric Medicine and Surgery  [address] [place] [address]  Phone: [phone]  Fax: [phone]  Follow Up Time:

## 2024-06-07 NOTE — BRIEF OPERATIVE NOTE - COMMENTS
Patient tolerated procedure well.   Patient to keep dressing intact until follow up appointment at Dr. Devi's outpatient clinic in one week.   WBAT in surgical shoe

## 2024-06-07 NOTE — ASU DISCHARGE PLAN (ADULT/PEDIATRIC) - NS MD DC FALL RISK RISK
For information on Fall & Injury Prevention, visit: [email or web address] OR  [email or web address] OR  https://www.cdc.gov/steadi/patient.html

## 2024-06-07 NOTE — ASU DISCHARGE PLAN (ADULT/PEDIATRIC) - ASU DC SPECIAL INSTRUCTIONSFT
Patient to keep dressing intact until follow up appointment at Dr. Devi's outpatient clinic in one week.   WBAT in surgical shoe

## 2024-06-07 NOTE — ASU PATIENT PROFILE, ADULT - FALL HARM RISK - HARM RISK INTERVENTIONS

## 2024-06-07 NOTE — BRIEF OPERATIVE NOTE - NSICDXBRIEFPREOP_GEN_ALL_CORE_FT
Cristopher Forrester)
PRE-OP DIAGNOSIS:  Hammertoe of right foot 07-Jun-2024 09:38:58  Karma Aguilar

## 2024-06-07 NOTE — BRIEF OPERATIVE NOTE - NSICDXBRIEFPROCEDURE_GEN_ALL_CORE_FT
PROCEDURES:  Arthroplasty, toe, PIP joint, for suraj 07-Jun-2024 09:38:48 Right 2nd Digit Karma Aguilar

## 2024-06-07 NOTE — CHART NOTE - NSCHARTNOTEFT_GEN_A_CORE
PACU ANESTHESIA ADMISSION NOTE      Procedure:   Post op diagnosis:      ____  Intubated  TV:______       Rate: ______      FiO2: ______    _x___  Patent Airway    _x___  Full return of protective reflexes    ___x_  Full recovery from anesthesia / back to baseline   x  Vitals:   T:  98         R:   15               BP:  120/67                Sat:  97                 P: 61      Mental Status: x ____ Awake   __x___ Alert   _____ Drowsy   _____ Sedated    Nausea/Vomiting:  ____ NO  ______Yes,   See Post - Op Orders          Pain Scale (0-10):  _____    Treatment: ____ None    ____ See Post - Op/PCA Orders    Post - Operative Fluids:   ____ Oral   ____ See Post - Op Orders    Plan: Discharge:   x____Home       _____Floor     _____Critical Care    _____  Other:_________________    Comments:

## 2024-06-11 LAB — SURGICAL PATHOLOGY STUDY: SIGNIFICANT CHANGE UP

## 2024-06-26 ENCOUNTER — APPOINTMENT (OUTPATIENT)
Dept: PEDIATRIC ALLERGY IMMUNOLOGY | Facility: CLINIC | Age: 74
End: 2024-06-26
Payer: MEDICARE

## 2024-06-26 VITALS
WEIGHT: 109 LBS | BODY MASS INDEX: 18.61 KG/M2 | DIASTOLIC BLOOD PRESSURE: 60 MMHG | SYSTOLIC BLOOD PRESSURE: 105 MMHG | HEIGHT: 64 IN

## 2024-06-26 DIAGNOSIS — H10.13 ACUTE ATOPIC CONJUNCTIVITIS, BILATERAL: ICD-10-CM

## 2024-06-26 DIAGNOSIS — T50.905A ADVERSE EFFECT OF UNSPECIFIED DRUGS, MEDICAMENTS AND BIOLOGICAL SUBSTANCES, INITIAL ENCOUNTER: ICD-10-CM

## 2024-06-26 DIAGNOSIS — J30.2 OTHER ALLERGIC RHINITIS: ICD-10-CM

## 2024-06-26 DIAGNOSIS — J30.89 OTHER ALLERGIC RHINITIS: ICD-10-CM

## 2024-06-26 DIAGNOSIS — T78.1XXA OTHER ADVERSE FOOD REACTIONS, NOT ELSEWHERE CLASSIFIED, INITIAL ENCOUNTER: ICD-10-CM

## 2024-06-26 PROCEDURE — 99213 OFFICE O/P EST LOW 20 MIN: CPT

## 2024-06-26 RX ORDER — EPINEPHRINE 0.3 MG/.3ML
0.3 INJECTION INTRAMUSCULAR
Qty: 1 | Refills: 0 | Status: ACTIVE | COMMUNITY
Start: 2022-10-14 | End: 1900-01-01

## 2024-07-29 ENCOUNTER — INPATIENT (INPATIENT)
Facility: HOSPITAL | Age: 74
LOS: 10 days | Discharge: HOME HEALTH | DRG: 392 | End: 2024-08-09
Attending: SURGERY | Admitting: SURGERY
Payer: MEDICARE

## 2024-07-29 VITALS
OXYGEN SATURATION: 100 % | RESPIRATION RATE: 18 BRPM | WEIGHT: 108.03 LBS | SYSTOLIC BLOOD PRESSURE: 119 MMHG | HEIGHT: 64 IN | HEART RATE: 58 BPM | TEMPERATURE: 98 F | DIASTOLIC BLOOD PRESSURE: 68 MMHG

## 2024-07-29 DIAGNOSIS — K56.51 INTESTINAL ADHESIONS [BANDS], WITH PARTIAL OBSTRUCTION: ICD-10-CM

## 2024-07-29 DIAGNOSIS — Z88.0 ALLERGY STATUS TO PENICILLIN: ICD-10-CM

## 2024-07-29 DIAGNOSIS — Z87.892 PERSONAL HISTORY OF ANAPHYLAXIS: ICD-10-CM

## 2024-07-29 DIAGNOSIS — R18.8 OTHER ASCITES: ICD-10-CM

## 2024-07-29 DIAGNOSIS — Z91.041 RADIOGRAPHIC DYE ALLERGY STATUS: ICD-10-CM

## 2024-07-29 DIAGNOSIS — E87.6 HYPOKALEMIA: ICD-10-CM

## 2024-07-29 DIAGNOSIS — J90 PLEURAL EFFUSION, NOT ELSEWHERE CLASSIFIED: ICD-10-CM

## 2024-07-29 DIAGNOSIS — E87.1 HYPO-OSMOLALITY AND HYPONATREMIA: ICD-10-CM

## 2024-07-29 DIAGNOSIS — Z91.040 LATEX ALLERGY STATUS: ICD-10-CM

## 2024-07-29 DIAGNOSIS — M81.0 AGE-RELATED OSTEOPOROSIS WITHOUT CURRENT PATHOLOGICAL FRACTURE: ICD-10-CM

## 2024-07-29 DIAGNOSIS — K21.9 GASTRO-ESOPHAGEAL REFLUX DISEASE WITHOUT ESOPHAGITIS: ICD-10-CM

## 2024-07-29 DIAGNOSIS — Z88.2 ALLERGY STATUS TO SULFONAMIDES: ICD-10-CM

## 2024-07-29 DIAGNOSIS — S42.402A UNSPECIFIED FRACTURE OF LOWER END OF LEFT HUMERUS, INITIAL ENCOUNTER FOR CLOSED FRACTURE: Chronic | ICD-10-CM

## 2024-07-29 DIAGNOSIS — Z88.6 ALLERGY STATUS TO ANALGESIC AGENT: ICD-10-CM

## 2024-07-29 DIAGNOSIS — K55.011 FOCAL (SEGMENTAL) ACUTE (REVERSIBLE) ISCHEMIA OF SMALL INTESTINE: ICD-10-CM

## 2024-07-29 DIAGNOSIS — E83.39 OTHER DISORDERS OF PHOSPHORUS METABOLISM: ICD-10-CM

## 2024-07-29 DIAGNOSIS — Z98.890 OTHER SPECIFIED POSTPROCEDURAL STATES: ICD-10-CM

## 2024-07-29 DIAGNOSIS — K52.9 NONINFECTIVE GASTROENTERITIS AND COLITIS, UNSPECIFIED: ICD-10-CM

## 2024-07-29 DIAGNOSIS — J98.11 ATELECTASIS: ICD-10-CM

## 2024-07-29 PROCEDURE — 99285 EMERGENCY DEPT VISIT HI MDM: CPT | Mod: FS

## 2024-07-29 RX ORDER — BACTERIOSTATIC SODIUM CHLORIDE 0.9 %
1000 VIAL (ML) INJECTION ONCE
Refills: 0 | Status: COMPLETED | OUTPATIENT
Start: 2024-07-29 | End: 2024-07-29

## 2024-07-29 RX ORDER — ONDANSETRON HCL/PF 4 MG/2 ML
4 VIAL (ML) INJECTION ONCE
Refills: 0 | Status: COMPLETED | OUTPATIENT
Start: 2024-07-29 | End: 2024-07-29

## 2024-07-29 RX ORDER — IOHEXOL 240 MG/ML
30 INJECTION, SOLUTION INTRATHECAL; INTRAVASCULAR; INTRAVENOUS; ORAL ONCE
Refills: 0 | Status: COMPLETED | OUTPATIENT
Start: 2024-07-29 | End: 2024-07-30

## 2024-07-29 RX ADMIN — Medication 2 MILLIGRAM(S): at 23:37

## 2024-07-29 RX ADMIN — Medication 1000 MILLILITER(S): at 23:37

## 2024-07-29 RX ADMIN — Medication 4 MILLIGRAM(S): at 23:37

## 2024-07-29 NOTE — ED ADULT NURSE NOTE - NSFALLUNIVINTERV_ED_ALL_ED
Bed/Stretcher in lowest position, wheels locked, appropriate side rails in place/Call bell, personal items and telephone in reach/Instruct patient to call for assistance before getting out of bed/chair/stretcher/Non-slip footwear applied when patient is off stretcher/[place] to call system/Physically safe environment - no spills, clutter or unnecessary equipment/Purposeful proactive rounding/Room/bathroom lighting operational, light cord in reach

## 2024-07-29 NOTE — ED ADULT NURSE REASSESSMENT NOTE - NS ED NURSE REASSESS COMMENT FT1
Pt states documented allergy to opioid-like analgesics is not a true allergy, states it is an intolerance. Pt chart updated to reflect intolerance. Pt requested morphine dose to be given.

## 2024-07-29 NOTE — ED ADULT NURSE NOTE - PAIN RATING/NUMBER SCALE (0-10): ACTIVITY
INFECTIOUS DISEASE INITIAL CONSULTATION     Reason for consult: MSSA in urine    CHIEF COMPLAINT  Right hip pain    HISTORY OF PRESENT ILLNESS  This is an 87 year old gentleman with a history of hypertension and chronic urinary retention s/p chronic indwelling Barber (+1 year) presenting [facility] with right hip pain. ID consulted on 10/8 for MSSA in the urine culture.    Patient reports he has had worsening right hip pain over the past few weeks; pain has been present for many years. More difficult to walk. He denies fever, chills, sweats, generalized fatigue, vision changes, floaters, headaches, flank pain, abdominal pain, suprapubic pain, pain around Barber site.     Denies tobacco  Infrequent alcohol use  No illicit drug use  No pets  Lives alone in an assisted living facility    Allergic to Naproxen.        PAST MEDICAL HISTORY    Past Medical History:   Diagnosis Date   • Essential (primary) hypertension    • Urinary tract infection        PAST SURGICAL HISTORY    No past surgical history on file.    MEDICATIONS  Current Facility-Administered Medications   Medication Dose Route Frequency Provider Last Rate Last Dose   • gabapentin (NEURONTIN) capsule 200 mg  200 mg Oral TID Rosalie Rocha MD   200 mg at 10/08/20 1338   • ceFAZolin (ANCEF) syringe 2,000 mg  2,000 mg Intravenous 2 times per day Rosalie Rocha MD       • metoPROLOL succinate (TOPROL-XL) ER tablet 12.5 mg  12.5 mg Oral Daily Rosalie Rocha MD   12.5 mg at 10/08/20 0911   • lidocaine (LIDOCARE) 4 % patch 1 patch  1 patch Transdermal Daily Kaela Patel MD   1 patch at 10/08/20 0914   • docusate sodium (COLACE) capsule 100 mg  100 mg Oral BID Kaela Patel MD   100 mg at 10/08/20 0911   • lidocaine (LIDOCARE) 4 % patch 1 patch  1 patch Transdermal Daily Rosalie Rocha MD   1 patch at 10/08/20 0914   • finasteride (PROSCAR) tablet 5 mg  5 mg Oral Daily Afia Booker MD   5 mg at 10/08/20 0911   • vitamin - therapeutic  multivitamins w/minerals tablet 1 tablet  1 tablet Oral Daily Afia Booker MD   1 tablet at 10/08/20 0925   • QUEtiapine (SEROquel) tablet 25 mg  25 mg Oral BID Afia Booker MD   25 mg at 10/08/20 0911   • tamsulosin (FLOMAX) capsule 0.4 mg  0.4 mg Oral Daily PC Afia Booker MD   0.4 mg at 10/08/20 0910   • atorvastatin (LIPITOR) tablet 20 mg  20 mg Oral Nightly Afia Booker MD   20 mg at 10/07/20 2117   • psyllium (METAMUCIL MULTIHEALTH FIBER) 58.12 % packet 1 packet  1 packet Oral Daily Afia Booker MD   1 packet at 10/08/20 0912   • sodium chloride 0.9 % flush bag 25 mL  25 mL Intravenous PRN Afia Booker MD       • sodium chloride (PF) 0.9 % injection 2 mL  2 mL Intracatheter 2 times per day Afia Booker MD   2 mL at 10/08/20 0925   • enoxaparin (LOVENOX) injection 30 mg  30 mg Subcutaneous Nightly Afia Booker MD   30 mg at 10/07/20 2122   • acetaminophen (TYLENOL) tablet 650 mg  650 mg Oral Q4H PRN Afia Booker MD   650 mg at 10/08/20 1200   • ondansetron (ZOFRAN) injection 4 mg  4 mg Intravenous Q12H PRN Afia Booker MD            ALLERGY  ALLERGIES:   Allergen Reactions   • Naproxen Other (See Comments)     From SNF record w/out reaction listed                      SOCIAL HISTORY    Social History     Tobacco Use   • Smoking status: Never Smoker   Substance Use Topics   • Alcohol use: Never     Frequency: Never   • Drug use: Never       FAMILY HISTORY    No family history on file.    Review of Systems  10 point ROS conducted.  As per HPI, rest of ROS is negative      PHYSICAL EXAMINATION  Vitals:    10/07/20 1507 10/08/20 0058 10/08/20 0930 10/08/20 1347   BP: (!) 141/74 108/69 133/75 117/69   Pulse: 74 89 74 63   Resp: 18 16 16 16   Temp: 97.9 °F (36.6 °C) 98.1 °F (36.7 °C) 97.5 °F (36.4 °C) 98.6 °F (37 °C)   TempSrc: Oral Oral Oral Oral   SpO2: 100% 95% 96% 96%   Weight:       Height:           Gen: NAD  HEENT: throat non-erythematous, no scleral icterus  CVS: faint heart sounds  Lung: CTA  bilaterally  Abd: soft, nt/nd, normal bowel sounds, no flank pain  Extr: no edema  Neuro: A+O x 3, no focal deficits  Skin: no rashes, no Osler's nodes, no [place] lesions  : Barber in place, no erythema/edema notd    REVIEW OF LABORATORY, PATHOLOGY, AND RADIOLOGY DATA  • Lab results:  CBC:  Lab Results   Component Value Date    WBC 4.7 10/06/2020    RBC 3.31 (L) 10/06/2020    HGB 10.7 (L) 10/06/2020    HCT 33.4 (L) 10/06/2020    .9 (H) 10/06/2020    MCH 32.3 10/06/2020    MCHC 32.0 10/06/2020     10/06/2020    ABASO 0.0 10/06/2020       CMP:  Recent Labs   Lab 10/06/20  0415 10/05/20  1159   SODIUM 140 140   POTASSIUM 4.2 4.5   CHLORIDE 110* 108*   CO2 28 29   BUN 25* 26*   CREATININE 1.61* 1.76*   GLUCOSE 82 118*   CALCIUM 8.8 8.9           IMAGING:  10/7/20 MRI  FINDINGS:   No fracture, contusion, suspicious osseous lesion, or marrow replacing process.  No suspicious enhancement.  No MRI findings to correlate with the lucency seen on recent x-ray.     Moderate right hip joint space narrowing with grade 3/4 chondral thinning and subcortical marrow T2 hyperintensity.   Small to moderate marginal osteophytes are present.  Moderate heterogeneous thickening of the anterior superior labrum with   intrasubstance T2 hyperintense signal.  4 mm superior per labral cyst noted (coronal image 17).  Fluid signal cleft and para labral cyst formation in the inferomedial acetabular labrum (coronal image 15).     Cyst in the inferomedial acetabulum.  Ligamentum teres is intact.     Gluteal and hamstring tendons are intact.  No iliopsoas or greater trochanteric bursitis.  Sciatic notch and piriformis muscle are unremarkable.  Ischiofemoral space is normal.  Mild T2 hyperintensity in the obturator internus muscle.     No enlarged lymph nodes.  Visualized portions of the pelvis are unremarkable.     IMPRESSION:  1. No MRI findings to correlate with the osseous lucency seen on prior x-rays.  No suspicious enhancing  osseous lesion.  2.  Moderate right hip osteoarthritis.  Degenerative labral tearing and para labral cyst.  3.  Grade 1 strain obturator internus muscle.    MICROBIOLOGY:  10/5 UCx = MSSA    ANTIBIOTICS:  Ceftriaxone 10/5 - present      ASSESSMENT     # Catheter associated UTI - present on admission; MSSA in culture; UA with >100 WBC  # Urinary retention s/p chronic indwelling Barber  # Osteoarthritis of the right hip - MRI without septic arthritis    RECOMMENDATIONS  - MSSA in the urine is unusual and is frequently hematogenous (from endocarditis, bacteremia, renal abscess, septic arthritis, etc)  - In this case, it is likely due to the Barber cathter  - Catheter exchanged 10/5  - Would stop Ceftriaxone, start Augmentin x 3 more days to complete 7 days total antibiotics  - Check blood cultures  - Check TTE  - Follow up with Urology as outpatient  - If right hip pain worsens, can consider Ortho consult for evaluation of septic arthritis but MRI negative and clinical exam without infection      Labs and imaging reviewed. Discussed with primary attending.     Thank you very much for this consultation. We will continue to follow this patient with you and assist in the management in any way that we can.      DO [facility]  [phone]       4 (moderate pain)

## 2024-07-30 DIAGNOSIS — R10.9 UNSPECIFIED ABDOMINAL PAIN: ICD-10-CM

## 2024-07-30 DIAGNOSIS — Z90.49 ACQUIRED ABSENCE OF OTHER SPECIFIED PARTS OF DIGESTIVE TRACT: Chronic | ICD-10-CM

## 2024-07-30 PROBLEM — H33.312 HORSESHOE TEAR OF RETINA WITHOUT DETACHMENT, LEFT EYE: Chronic | Status: ACTIVE | Noted: 2024-05-20

## 2024-07-30 PROBLEM — M81.0 AGE-RELATED OSTEOPOROSIS WITHOUT CURRENT PATHOLOGICAL FRACTURE: Chronic | Status: ACTIVE | Noted: 2024-05-20

## 2024-07-30 PROBLEM — T14.8XXA OTHER INJURY OF UNSPECIFIED BODY REGION, INITIAL ENCOUNTER: Chronic | Status: ACTIVE | Noted: 2024-05-20

## 2024-07-30 PROBLEM — H04.123 DRY EYE SYNDROME OF BILATERAL LACRIMAL GLANDS: Chronic | Status: ACTIVE | Noted: 2024-05-20

## 2024-07-30 PROBLEM — S92.911A UNSPECIFIED FRACTURE OF RIGHT TOE(S), INITIAL ENCOUNTER FOR CLOSED FRACTURE: Chronic | Status: ACTIVE | Noted: 2024-05-20

## 2024-07-30 LAB
ALBUMIN SERPL ELPH-MCNC: 5 G/DL — SIGNIFICANT CHANGE UP (ref 3.5–5.2)
ALP SERPL-CCNC: 137 U/L — HIGH (ref 30–115)
ALT FLD-CCNC: 21 U/L — SIGNIFICANT CHANGE UP (ref 0–41)
ANION GAP SERPL CALC-SCNC: 13 MMOL/L — SIGNIFICANT CHANGE UP (ref 7–14)
APPEARANCE UR: CLEAR — SIGNIFICANT CHANGE UP
AST SERPL-CCNC: 25 U/L — SIGNIFICANT CHANGE UP (ref 0–41)
BASOPHILS # BLD AUTO: 0.01 K/UL — SIGNIFICANT CHANGE UP (ref 0–0.2)
BASOPHILS NFR BLD AUTO: 0.1 % — SIGNIFICANT CHANGE UP (ref 0–1)
BILIRUB SERPL-MCNC: 0.5 MG/DL — SIGNIFICANT CHANGE UP (ref 0.2–1.2)
BILIRUB UR-MCNC: NEGATIVE — SIGNIFICANT CHANGE UP
BUN SERPL-MCNC: 17 MG/DL — SIGNIFICANT CHANGE UP (ref 10–20)
CALCIUM SERPL-MCNC: 9.6 MG/DL — SIGNIFICANT CHANGE UP (ref 8.4–10.5)
CHLORIDE SERPL-SCNC: 90 MMOL/L — LOW (ref 98–110)
CO2 SERPL-SCNC: 24 MMOL/L — SIGNIFICANT CHANGE UP (ref 17–32)
COLOR SPEC: YELLOW — SIGNIFICANT CHANGE UP
CREAT SERPL-MCNC: 0.8 MG/DL — SIGNIFICANT CHANGE UP (ref 0.7–1.5)
DIFF PNL FLD: NEGATIVE — SIGNIFICANT CHANGE UP
EGFR: 77 ML/MIN/1.73M2 — SIGNIFICANT CHANGE UP
EOSINOPHIL # BLD AUTO: 0 K/UL — SIGNIFICANT CHANGE UP (ref 0–0.7)
EOSINOPHIL NFR BLD AUTO: 0 % — SIGNIFICANT CHANGE UP (ref 0–8)
GLUCOSE SERPL-MCNC: 159 MG/DL — HIGH (ref 70–99)
GLUCOSE UR QL: NEGATIVE MG/DL — SIGNIFICANT CHANGE UP
HCT VFR BLD CALC: 37.1 % — SIGNIFICANT CHANGE UP (ref 37–47)
HCT VFR BLD CALC: 40 % — SIGNIFICANT CHANGE UP (ref 37–47)
HGB BLD-MCNC: 12.2 G/DL — SIGNIFICANT CHANGE UP (ref 12–16)
HGB BLD-MCNC: 13.1 G/DL — SIGNIFICANT CHANGE UP (ref 12–16)
IMM GRANULOCYTES NFR BLD AUTO: 0.3 % — SIGNIFICANT CHANGE UP (ref 0.1–0.3)
KETONES UR-MCNC: ABNORMAL MG/DL
LACTATE SERPL-SCNC: 2 MMOL/L — SIGNIFICANT CHANGE UP (ref 0.7–2)
LEUKOCYTE ESTERASE UR-ACNC: NEGATIVE — SIGNIFICANT CHANGE UP
LIDOCAIN IGE QN: 15 U/L — SIGNIFICANT CHANGE UP (ref 7–60)
LYMPHOCYTES # BLD AUTO: 0.51 K/UL — LOW (ref 1.2–3.4)
LYMPHOCYTES # BLD AUTO: 7.3 % — LOW (ref 20.5–51.1)
MCHC RBC-ENTMCNC: 26.4 PG — LOW (ref 27–31)
MCHC RBC-ENTMCNC: 26.5 PG — LOW (ref 27–31)
MCHC RBC-ENTMCNC: 32.8 G/DL — SIGNIFICANT CHANGE UP (ref 32–37)
MCHC RBC-ENTMCNC: 32.9 G/DL — SIGNIFICANT CHANGE UP (ref 32–37)
MCV RBC AUTO: 80.5 FL — LOW (ref 81–99)
MCV RBC AUTO: 80.5 FL — LOW (ref 81–99)
MONOCYTES # BLD AUTO: 0.26 K/UL — SIGNIFICANT CHANGE UP (ref 0.1–0.6)
MONOCYTES NFR BLD AUTO: 3.7 % — SIGNIFICANT CHANGE UP (ref 1.7–9.3)
NEUTROPHILS # BLD AUTO: 6.22 K/UL — SIGNIFICANT CHANGE UP (ref 1.4–6.5)
NEUTROPHILS NFR BLD AUTO: 88.6 % — HIGH (ref 42.2–75.2)
NITRITE UR-MCNC: NEGATIVE — SIGNIFICANT CHANGE UP
NRBC # BLD: 0 /100 WBCS — SIGNIFICANT CHANGE UP (ref 0–0)
NRBC # BLD: 0 /100 WBCS — SIGNIFICANT CHANGE UP (ref 0–0)
PH UR: 7.5 — SIGNIFICANT CHANGE UP (ref 5–8)
PLATELET # BLD AUTO: 158 K/UL — SIGNIFICANT CHANGE UP (ref 130–400)
PLATELET # BLD AUTO: 162 K/UL — SIGNIFICANT CHANGE UP (ref 130–400)
PMV BLD: 11.3 FL — HIGH (ref 7.4–10.4)
PMV BLD: 12.1 FL — HIGH (ref 7.4–10.4)
POTASSIUM SERPL-MCNC: 4.8 MMOL/L — SIGNIFICANT CHANGE UP (ref 3.5–5)
POTASSIUM SERPL-SCNC: 4.8 MMOL/L — SIGNIFICANT CHANGE UP (ref 3.5–5)
PROT SERPL-MCNC: 7.1 G/DL — SIGNIFICANT CHANGE UP (ref 6–8)
PROT UR-MCNC: SIGNIFICANT CHANGE UP MG/DL
RBC # BLD: 4.61 M/UL — SIGNIFICANT CHANGE UP (ref 4.2–5.4)
RBC # BLD: 4.97 M/UL — SIGNIFICANT CHANGE UP (ref 4.2–5.4)
RBC # FLD: 14.5 % — SIGNIFICANT CHANGE UP (ref 11.5–14.5)
RBC # FLD: 14.6 % — HIGH (ref 11.5–14.5)
SODIUM SERPL-SCNC: 127 MMOL/L — LOW (ref 135–146)
SP GR SPEC: 1.01 — SIGNIFICANT CHANGE UP (ref 1–1.03)
UROBILINOGEN FLD QL: 0.2 MG/DL — SIGNIFICANT CHANGE UP (ref 0.2–1)
WBC # BLD: 10.93 K/UL — HIGH (ref 4.8–10.8)
WBC # BLD: 7.02 K/UL — SIGNIFICANT CHANGE UP (ref 4.8–10.8)
WBC # FLD AUTO: 10.93 K/UL — HIGH (ref 4.8–10.8)
WBC # FLD AUTO: 7.02 K/UL — SIGNIFICANT CHANGE UP (ref 4.8–10.8)

## 2024-07-30 PROCEDURE — 74018 RADEX ABDOMEN 1 VIEW: CPT | Mod: 26,59

## 2024-07-30 PROCEDURE — 71045 X-RAY EXAM CHEST 1 VIEW: CPT | Mod: 26,76

## 2024-07-30 PROCEDURE — C1889: CPT

## 2024-07-30 PROCEDURE — 84100 ASSAY OF PHOSPHORUS: CPT

## 2024-07-30 PROCEDURE — 85027 COMPLETE CBC AUTOMATED: CPT

## 2024-07-30 PROCEDURE — 71045 X-RAY EXAM CHEST 1 VIEW: CPT

## 2024-07-30 PROCEDURE — 99223 1ST HOSP IP/OBS HIGH 75: CPT

## 2024-07-30 PROCEDURE — 74019 RADEX ABDOMEN 2 VIEWS: CPT | Mod: 26,59

## 2024-07-30 PROCEDURE — 85610 PROTHROMBIN TIME: CPT

## 2024-07-30 PROCEDURE — 74021 RADEX ABDOMEN 3+ VIEWS: CPT | Mod: 26

## 2024-07-30 PROCEDURE — 74176 CT ABD & PELVIS W/O CONTRAST: CPT | Mod: MC

## 2024-07-30 PROCEDURE — 80048 BASIC METABOLIC PNL TOTAL CA: CPT

## 2024-07-30 PROCEDURE — 86850 RBC ANTIBODY SCREEN: CPT

## 2024-07-30 PROCEDURE — 93005 ELECTROCARDIOGRAM TRACING: CPT

## 2024-07-30 PROCEDURE — 74021 RADEX ABDOMEN 3+ VIEWS: CPT

## 2024-07-30 PROCEDURE — 74176 CT ABD & PELVIS W/O CONTRAST: CPT | Mod: 26,MC

## 2024-07-30 PROCEDURE — 86901 BLOOD TYPING SEROLOGIC RH(D): CPT

## 2024-07-30 PROCEDURE — 85730 THROMBOPLASTIN TIME PARTIAL: CPT

## 2024-07-30 PROCEDURE — 83735 ASSAY OF MAGNESIUM: CPT

## 2024-07-30 PROCEDURE — 74018 RADEX ABDOMEN 1 VIEW: CPT

## 2024-07-30 PROCEDURE — C9399: CPT

## 2024-07-30 PROCEDURE — 88307 TISSUE EXAM BY PATHOLOGIST: CPT

## 2024-07-30 PROCEDURE — 80053 COMPREHEN METABOLIC PANEL: CPT

## 2024-07-30 PROCEDURE — 85025 COMPLETE CBC W/AUTO DIFF WBC: CPT

## 2024-07-30 PROCEDURE — 36415 COLL VENOUS BLD VENIPUNCTURE: CPT

## 2024-07-30 PROCEDURE — 86900 BLOOD TYPING SEROLOGIC ABO: CPT

## 2024-07-30 RX ORDER — BACTERIOSTATIC SODIUM CHLORIDE 0.9 %
1000 VIAL (ML) INJECTION
Refills: 0 | Status: DISCONTINUED | OUTPATIENT
Start: 2024-07-30 | End: 2024-08-01

## 2024-07-30 RX ORDER — DEXTROSE MONOHYDRATE, SODIUM CHLORIDE, SODIUM LACTATE, CALCIUM CHLORIDE, MAGNESIUM CHLORIDE 1.5; 538; 448; 18.4; 5.08 G/100ML; MG/100ML; MG/100ML; MG/100ML; MG/100ML
1000 SOLUTION INTRAPERITONEAL
Refills: 0 | Status: DISCONTINUED | OUTPATIENT
Start: 2024-07-30 | End: 2024-07-30

## 2024-07-30 RX ORDER — ACETAMINOPHEN 500 MG
650 TABLET ORAL EVERY 6 HOURS
Refills: 0 | Status: DISCONTINUED | OUTPATIENT
Start: 2024-07-30 | End: 2024-07-30

## 2024-07-30 RX ORDER — PANTOPRAZOLE SODIUM 20 MG/1
40 TABLET, DELAYED RELEASE ORAL DAILY
Refills: 0 | Status: DISCONTINUED | OUTPATIENT
Start: 2024-07-30 | End: 2024-08-02

## 2024-07-30 RX ORDER — ACETAMINOPHEN 500 MG
725 TABLET ORAL ONCE
Refills: 0 | Status: COMPLETED | OUTPATIENT
Start: 2024-07-30 | End: 2024-07-30

## 2024-07-30 RX ORDER — ONDANSETRON HCL/PF 4 MG/2 ML
4 VIAL (ML) INJECTION ONCE
Refills: 0 | Status: COMPLETED | OUTPATIENT
Start: 2024-07-30 | End: 2024-07-30

## 2024-07-30 RX ORDER — ONDANSETRON HCL/PF 4 MG/2 ML
4 VIAL (ML) INJECTION EVERY 6 HOURS
Refills: 0 | Status: DISCONTINUED | OUTPATIENT
Start: 2024-07-30 | End: 2024-08-02

## 2024-07-30 RX ORDER — ENOXAPARIN SODIUM 120 MG/.8ML
40 INJECTION SUBCUTANEOUS EVERY 24 HOURS
Refills: 0 | Status: DISCONTINUED | OUTPATIENT
Start: 2024-07-30 | End: 2024-08-02

## 2024-07-30 RX ADMIN — Medication 725 MILLIGRAM(S): at 22:00

## 2024-07-30 RX ADMIN — IOHEXOL 30 MILLILITER(S): 240 INJECTION, SOLUTION INTRATHECAL; INTRAVASCULAR; INTRAVENOUS; ORAL at 00:12

## 2024-07-30 RX ADMIN — Medication 100 MILLILITER(S): at 11:11

## 2024-07-30 RX ADMIN — Medication 4 MILLIGRAM(S): at 01:35

## 2024-07-30 RX ADMIN — Medication 290 MILLIGRAM(S): at 21:10

## 2024-07-30 RX ADMIN — ENOXAPARIN SODIUM 40 MILLIGRAM(S): 120 INJECTION SUBCUTANEOUS at 21:12

## 2024-07-30 RX ADMIN — Medication 4 MILLIGRAM(S): at 09:11

## 2024-07-30 RX ADMIN — Medication 2 MILLIGRAM(S): at 01:35

## 2024-07-30 RX ADMIN — Medication 4 MILLIGRAM(S): at 16:51

## 2024-07-30 RX ADMIN — Medication 100 MILLILITER(S): at 21:11

## 2024-07-30 RX ADMIN — Medication 290 MILLIGRAM(S): at 13:24

## 2024-07-30 RX ADMIN — Medication 725 MILLIGRAM(S): at 01:34

## 2024-07-30 RX ADMIN — DEXTROSE MONOHYDRATE, SODIUM CHLORIDE, SODIUM LACTATE, CALCIUM CHLORIDE, MAGNESIUM CHLORIDE 90 MILLILITER(S): 1.5; 538; 448; 18.4; 5.08 SOLUTION INTRAPERITONEAL at 07:54

## 2024-07-30 RX ADMIN — Medication 290 MILLIGRAM(S): at 06:24

## 2024-07-30 RX ADMIN — DEXTROSE MONOHYDRATE, SODIUM CHLORIDE, SODIUM LACTATE, CALCIUM CHLORIDE, MAGNESIUM CHLORIDE 90 MILLILITER(S): 1.5; 538; 448; 18.4; 5.08 SOLUTION INTRAPERITONEAL at 06:24

## 2024-07-30 RX ADMIN — PANTOPRAZOLE SODIUM 40 MILLIGRAM(S): 20 TABLET, DELAYED RELEASE ORAL at 11:06

## 2024-07-30 RX ADMIN — Medication 2 MILLIGRAM(S): at 05:37

## 2024-07-30 RX ADMIN — Medication 1 SPRAY(S): at 15:53

## 2024-07-30 NOTE — PROVIDER CONTACT NOTE (OTHER) - SITUATION
I wanted to follow up if enemas should still be given with NGT. pt had an episode of emesis, RN to give zofran as ordered. can RN clamp NGT for ambulation and CT scan

## 2024-07-30 NOTE — PROVIDER CONTACT NOTE (OTHER) - SITUATION
pt returned from xray, RN was able to administer IV Tylenol. pt is requesting to speak with team. c/o burning to abd. IV protonix given as ordered previously

## 2024-07-30 NOTE — ED PROVIDER NOTE - NS ED ATTENDING STATEMENT MOD
Please call positive COVID19 results.
This was a shared visit with the AICHA. I reviewed and verified the documentation.

## 2024-07-30 NOTE — PATIENT PROFILE ADULT - FALL HARM RISK - HARM RISK INTERVENTIONS
Assistance with ambulation/Assistance OOB with selected safe patient handling equipment/Communicate Risk of Fall with Harm to all staff/Monitor for mental status changes/Monitor gait and stability/Move patient closer to nurses' station/Provide patient with walking aids - walker, cane, crutches/Reinforce activity limits and safety measures with patient and family/Reorient to person, place and time as needed/Review medications for side effects contributing to fall risk/Sit up slowly, dangle for a short time, stand at bedside before walking/Tailored Fall Risk Interventions/Toileting schedule using arm’s reach rule for commode and bathroom/Use of alarms - bed, chair and/or voice tab/Visual Cue: Yellow wristband and red socks/Bed in lowest position, wheels locked, appropriate side rails in place/Call bell, personal items and telephone in reach/Instruct patient to call for assistance before getting out of bed or chair/Non-slip footwear when patient is out of bed/[place] to call system/Physically safe environment - no spills, clutter or unnecessary equipment/Purposeful Proactive Rounding/Room/bathroom lighting operational, light cord in reach

## 2024-07-30 NOTE — H&P ADULT - ASSESSMENT
ASSESSMENT: Patient is a 74F w/ pmhx of appendectomy, GERD, osteoporosis, left elbow surgery, recent right foot surgery for hammer toe who presents complaining of abdominal pain, nausea, vomiting that began yesterday, but acutely worsened today around 2PM. Since that time the patient has had "several" episodes of nausea and vomiting that started out as food contents, and transitioned to stomach acid. She endorses bowel movements yesterday and today, no diarrhea, both normal in consistency.     Physical exam findings, imaging, and labs as documented above.    General Surgery team consulted for possible small bowel obstruction     PLAN:  - Admit to General Surgery Service under Dr. Guzman  - NPO, IVF  - Pain control  - Tap water enemas to try to relieve diffuse colonic stool burden.   - Repeat KUB later today to monitor for PO contrast progression  - DVT ppx  - Serial abdominal exams    [phone]    Above plan discussed with Attending Surgeon Dr. Guzman , patient, and ED team  07-30-24 @ 04:22

## 2024-07-30 NOTE — PROVIDER CONTACT NOTE (OTHER) - SITUATION
pt has 2 orders for enemas, first order says tap water enemas 4 times daily, second order placed after just says enemas 3 times daily, without type of enema pt has 2 orders for enemas, first order says tap water enemas 4 times daily, second order placed after just says enemas 3 times daily, without type of enema. pt had x1 emesis, zofran given

## 2024-07-30 NOTE — PROVIDER CONTACT NOTE (OTHER) - SITUATION
pt just took PO Tylenol as per orders, pt immediately threw up. this is second episode of emesis. pt is still c/o abd pain. can IV Tylenol be ordered? RN administer x1 tap water enema, no BM yet.

## 2024-07-30 NOTE — ED PROVIDER NOTE - OBJECTIVE STATEMENT
74 years old female history of multiple food allergies, status post appendectomy presents complaints of generalized abdominal pain associated nausea and multiple episodes of NBNB vomiting since 3 PM today.  Patient reports she had mild abdominal discomfort since last night.  Vomiting and abdominal pain since this afternoon and not resolved so she comes to ED for evaluation this evening.  Denies similar episode abdominal pain in the past.  Last bowel movement was yesterday and it was normal.  Otherwise denies fever, chills, chest pain, shortness of breath and urinary symptoms.

## 2024-07-30 NOTE — ED PROVIDER NOTE - ATTENDING APP SHARED VISIT CONTRIBUTION OF CARE
74-year-old female with past medical history of GERD, elbow surgery, rotator cuff surgery, foot surgery, appendectomy presents to the ED for abdominal pain since 2 PM.  Patient states that she has had some ice cream this evening and also ate some rice and beans that was 3 days old (but she made and was in her refrigerator).  States after consuming these foods she started feeling nauseous and then began vomiting several times.  Patient states she has diffuse abdominal discomfort that is sharp in nature.  Last BM was last night.  Denies any dysuria/fever/chills/chest pain/shortness of breath/back pain.  No leg swelling.  Did not take any medications at home for pain relief.  States she began to feel very sweaty and lightheaded therefore came to ED for evaluation.    AVSS patient with diffuse abdominal tenderness on exam with no mass, no rebound or guarding,+ bowel sounds.  Remainder exam as per PA note.    A/P Labs, urine, CT abdomen pelvis with oral contrast (refusing IV contrast) and reassess

## 2024-07-30 NOTE — H&P ADULT - ATTENDING COMMENTS
Trauma/Acute Care Surgery Attending Note Attestation  Patient was seen and examined bedside.  I reviewed the resident/PA note and agreed with above assessment and plan with following additions and corrections.    History as above. Patient appears very uncomfortable.    T(C): 36.4 (07-30-24 @ 08:13), Max: 36.6 (07-30-24 @ 05:53)  HR: 60 (07-30-24 @ 08:13) (58 - 60)  BP: 129/59 (07-30-24 @ 08:13) (119/68 - 129/59)  RR: 18 (07-30-24 @ 08:13) (18 - 18)  SpO2: 100% (07-30-24 @ 08:13) (99% - 100%)      07-30-24 @ 07:01  -  07-30-24 @ 13:02  --------------------------------------------------------  IN:    Lactated Ringers: 180 mL    sodium chloride 0.9%: 400 mL  Total IN: 580 mL    OUT:    Voided (mL): 300 mL  Total OUT: 300 mL    Total NET: 280 mL    I independently performed a medically appropriate exam. The exam was notable for abdominal distention. No significant tenderness to palpation.                          12.2   7.02  )-----------( 158      ( 30 Jul 2024 00:07 )             37.1     07-30    127<L>  |  90<L>  |  17  ----------------------------<  159<H>  4.8   |  24  |  0.8    Ca 9.6; Mg x ; Phos x       ( 30 Jul 2024 00:07 )  Alb: 5.0 g/dL / Pro: 7.1 g/dL / AlkPhos: 137 U/L / ALT: 21 U/L / AST: 25 U/L / GGT:x     / Tbili 0.5 mg/dL/ Dbili x     / Indbili x        Lactate, Blood: 2.0 mmol/L (07-30 @ 00:07)    CT A/P reviewed and interpreted by me: contrast not yet in colon, diffuse stool burden in colon, mildly dilated small bowel loops    Assessment/Plan:  74y Female PMH GERD who presents with abdominal pain and imaging findings concerning for small bowel obstruction versus severe constipation. At the time of my evaluation, the patient was no longer nauseous. No acute surgical intervention. Admit to surgery. Keep NPO with IVF. Can give water/mineral oil enemas. IVF resuscitation for hyponatremia and re-check BMP later today. Repeat KUB later today to check for progression of contrast. Protonix 40mg IV daily for GERD. DVT ppx.    Francisco Guzman MD  Trauma/Acute Care Surgery/Surgical Critical Care Attending

## 2024-07-30 NOTE — H&P ADULT - NSICDXPASTSURGICALHX_GEN_ALL_CORE_FT
PAST SURGICAL HISTORY:  Elbow fracture, left      PAST SURGICAL HISTORY:  Elbow fracture, left     S/P appendectomy

## 2024-07-30 NOTE — ED PROVIDER NOTE - PHYSICAL EXAMINATION
CONSTITUTIONAL: Elderly female; in no apparent distress.   EYES: PERRL; EOM intact.   CARDIOVASCULAR: Normal S1, S2; no murmurs, rubs, or gallops.   RESPIRATORY: Normal chest excursion with respiration; breath sounds clear and equal bilaterally; no wheezes, rhonchi, or rales.  GI/: Decreased bowel sounds all 4 quadrants.  Mild generalized tenderness.  Soft abdomen with no rebound or guarding.  SKIN: Normal for age and race; warm; dry; good turgor; no apparent lesions or exudate.   NEURO/PSYCH: A & O x 4; grossly unremarkable.  Anxious mood

## 2024-07-30 NOTE — PATIENT PROFILE ADULT - IS PATIENT POST-MENOPAUSAL?
Interval History:  He is no longer having any fever and has little or no pain.  He is aware that he may need amputation of his leg .  Blood culture and 1 bottle is growing Gram-negative rods but other bottle shows no growth.  He still received IV antibiotics.    Review of Systems   Constitutional: Negative.    HENT: Negative.     Eyes: Negative.    Respiratory: Negative.     Cardiovascular: Negative.    Gastrointestinal: Negative.    Endocrine: Negative.    Genitourinary: Negative.    Musculoskeletal: Negative.    Skin:  Positive for color change. Wound: right foot.  Allergic/Immunologic: Negative.    Neurological: Negative.    Hematological: Negative.    Psychiatric/Behavioral: Negative.     Objective:     Vital Signs (Most Recent):  Temp: 100.2 °F (37.9 °C) (04/22/23 1100)  Pulse: 97 (04/22/23 1100)  Resp: 19 (04/22/23 1100)  BP: (!) 158/71 (04/22/23 1100)  SpO2: 99 % (04/22/23 1100)   Vital Signs (24h Range):  Temp:  [96.2 °F (35.7 °C)-102.6 °F (39.2 °C)] 100.2 °F (37.9 °C)  Pulse:  [] 97  Resp:  [17-28] 19  SpO2:  [96 %-100 %] 99 %  BP: (109-158)/(56-72) 158/71     Weight: 61.3 kg (135 lb 2.3 oz)  Body mass index is 20.55 kg/m².    Intake/Output Summary (Last 24 hours) at 4/22/2023 1104  Last data filed at 4/22/2023 0850  Gross per 24 hour   Intake 2030 ml   Output 450 ml   Net 1580 ml      Physical Exam  Constitutional:       Appearance: Normal appearance.   HENT:      Head: Normocephalic and atraumatic.      Right Ear: External ear normal.      Left Ear: External ear normal.      Nose: Nose normal.      Mouth/Throat:      Mouth: Mucous membranes are moist.   Eyes:      Extraocular Movements: Extraocular movements intact.   Cardiovascular:      Rate and Rhythm: Normal rate and regular rhythm.      Comments: Trace pulses in lower extremities  Pulmonary:      Effort: Pulmonary effort is normal.      Breath sounds: Normal breath sounds.   Abdominal:      General: Abdomen is flat.      Palpations: Abdomen is  soft.   Musculoskeletal:         General: Swelling (right foot) and deformity (midfoot amputation) present.      Cervical back: Normal range of motion and neck supple.   Skin:     Capillary Refill: Capillary refill takes less than 2 seconds.      Findings: Lesion (Sutures in place) present.   Neurological:      General: No focal deficit present.      Mental Status: He is alert and oriented to person, place, and time. Mental status is at baseline.   Psychiatric:         Mood and Affect: Mood normal.         Behavior: Behavior normal.         Thought Content: Thought content normal.         Judgment: Judgment normal.       Significant Labs: All pertinent labs within the past 24 hours have been reviewed.  Blood Culture:   Recent Labs   Lab 04/21/23  1449 04/21/23  1544   LABBLOO Gram stain janice bottle: Gram negative rods  Results called to and read back by: Li Julian LPN 04/22/2023  08:58 No Growth to date       Significant Imaging: I have reviewed all pertinent imaging results/findings within the past 24 hours.   yes

## 2024-07-30 NOTE — PROVIDER CONTACT NOTE (MEDICATION) - SITUATION
pt had another episode of emesis, NGT outputting. pt received Enema as ordered without success, pt's emesis was previously yellow like bile, it is now dark brown

## 2024-07-30 NOTE — ED PROVIDER NOTE - CARE PLAN
1 Principal Discharge DX:	Abdominal pain   Principal Discharge DX:	Intractable abdominal pain  Secondary Diagnosis:	Bowel obstruction

## 2024-07-30 NOTE — PROVIDER CONTACT NOTE (OTHER) - SITUATION
patient is declinging enema at this time, since she just got NGT, does pt still require enemas since she has NGT patient is declining enema at this time, since she just got NGT. does pt still require enemas since she has NGT

## 2024-07-30 NOTE — PROVIDER CONTACT NOTE (OTHER) - SITUATION
pt had another episode of emesis, pt is not due for Zofran at this time. pt stated Tylenol resolved pain, but she still has burning

## 2024-07-30 NOTE — H&P ADULT - HISTORY OF PRESENT ILLNESS
Patient is a 74F w/ pmhx of appendectomy, GERD, osteoporosis, left elbow surgery, recent right foot surgery for hammer toe who presents complaining of abdominal pain, nausea, vomiting that began yesterday, but acutely worsened today around 2PM. Since that time the patient has had "several" episodes of nausea and vomiting that started out as food contents, and transitioned to stomach acid. She endorses bowel movements yesterday and today, no diarrhea, both normal in consistency. She denies fevers, chest pain shortness of breath, or palpitations at this time.

## 2024-07-30 NOTE — PATIENT PROFILE ADULT - NSPRONUTRITIONRISK_GEN_A_NUR
Sent call to RN - Pt states Preps making him sick for today's CLN and EGD - having difficulty. Pls call. Thank you. No indicators present

## 2024-07-30 NOTE — H&P ADULT - NSHPPHYSICALEXAM_GEN_ALL_CORE
General: NAD, AAOx3, calm and cooperative  HEENT: NCAT, CHAGO, EOMI, Trachea ML, Neck supple  Cardiac: RRR S1, S2, no Murmurs, rubs or gallops  Respiratory: CTAB, normal respiratory effort, breath sounds equal BL, no wheeze, rhonchi or crackles  Abdomen: Soft, moderately distended, mild diffuse tenderness to palpation, worst in RLQ, no rebound, no guarding.   Musculoskeletal: Strength 5/5 BL UE/LE, ROM intact, compartments soft  Neuro: Sensation grossly intact and equal throughout, no focal deficits  Vascular: Pulses 2+ throughout, extremities well perfused  Skin: Warm/dry, normal color, no jaundice

## 2024-07-30 NOTE — H&P ADULT - NSHPLABSRESULTS_GEN_ALL_CORE
LAB/STUDIES:                        12.2   7.02  )-----------( 158      ( 2024 00:07 )             37.1         127<L>  |  90<L>  |  17  ----------------------------<  159<H>  4.8   |  24  |  0.8    Ca    9.6      2024 00:07    TPro  7.1  /  Alb  5.0  /  TBili  0.5  /  DBili  x   /  AST  25  /  ALT  21  /  AlkPhos  137<H>        LIVER FUNCTIONS - ( 2024 00:07 )  Alb: 5.0 g/dL / Pro: 7.1 g/dL / ALK PHOS: 137 U/L / ALT: 21 U/L / AST: 25 U/L / GGT: x           Urinalysis Basic - ( 2024 02:20 )    Color: Yellow / Appearance: Clear / S.015 / pH: x  Gluc: x / Ketone: Trace mg/dL  / Bili: Negative / Urobili: 0.2 mg/dL   Blood: x / Protein: Trace mg/dL / Nitrite: Negative   Leuk Esterase: Negative / RBC: x / WBC x   Sq Epi: x / Non Sq Epi: x / Bacteria: x    IMAGING:  < from: CT Abdomen and Pelvis w/ Oral Cont (24 @ 02:36) >  Mildly nonspecific distended distal small bowel loop in the pelvis   without definite evidence of a small bowel obstruction. The oral contrast   has not yet transverse to the colon. Short-term follow-up radiograph in a   2 hours can be obtained to confirm appropriate transition.    < end of copied text >

## 2024-07-31 LAB
ANION GAP SERPL CALC-SCNC: 18 MMOL/L — HIGH (ref 7–14)
ANION GAP SERPL CALC-SCNC: 9 MMOL/L — SIGNIFICANT CHANGE UP (ref 7–14)
BASOPHILS # BLD AUTO: 0.01 K/UL — SIGNIFICANT CHANGE UP (ref 0–0.2)
BASOPHILS NFR BLD AUTO: 0.2 % — SIGNIFICANT CHANGE UP (ref 0–1)
BUN SERPL-MCNC: 13 MG/DL — SIGNIFICANT CHANGE UP (ref 10–20)
BUN SERPL-MCNC: 14 MG/DL — SIGNIFICANT CHANGE UP (ref 10–20)
CALCIUM SERPL-MCNC: 9 MG/DL — SIGNIFICANT CHANGE UP (ref 8.4–10.5)
CALCIUM SERPL-MCNC: 9.1 MG/DL — SIGNIFICANT CHANGE UP (ref 8.4–10.5)
CHLORIDE SERPL-SCNC: 90 MMOL/L — LOW (ref 98–110)
CHLORIDE SERPL-SCNC: 95 MMOL/L — LOW (ref 98–110)
CO2 SERPL-SCNC: 19 MMOL/L — SIGNIFICANT CHANGE UP (ref 17–32)
CO2 SERPL-SCNC: 24 MMOL/L — SIGNIFICANT CHANGE UP (ref 17–32)
CREAT SERPL-MCNC: 0.7 MG/DL — SIGNIFICANT CHANGE UP (ref 0.7–1.5)
CREAT SERPL-MCNC: 0.8 MG/DL — SIGNIFICANT CHANGE UP (ref 0.7–1.5)
CULTURE RESULTS: SIGNIFICANT CHANGE UP
EGFR: 77 ML/MIN/1.73M2 — SIGNIFICANT CHANGE UP
EGFR: 91 ML/MIN/1.73M2 — SIGNIFICANT CHANGE UP
EOSINOPHIL # BLD AUTO: 0 K/UL — SIGNIFICANT CHANGE UP (ref 0–0.7)
EOSINOPHIL NFR BLD AUTO: 0 % — SIGNIFICANT CHANGE UP (ref 0–8)
GLUCOSE SERPL-MCNC: 121 MG/DL — HIGH (ref 70–99)
GLUCOSE SERPL-MCNC: 140 MG/DL — HIGH (ref 70–99)
HCT VFR BLD CALC: 40.5 % — SIGNIFICANT CHANGE UP (ref 37–47)
HGB BLD-MCNC: 13.2 G/DL — SIGNIFICANT CHANGE UP (ref 12–16)
IMM GRANULOCYTES NFR BLD AUTO: 0.2 % — SIGNIFICANT CHANGE UP (ref 0.1–0.3)
LYMPHOCYTES # BLD AUTO: 0.55 K/UL — LOW (ref 1.2–3.4)
LYMPHOCYTES # BLD AUTO: 10 % — LOW (ref 20.5–51.1)
MAGNESIUM SERPL-MCNC: 2.1 MG/DL — SIGNIFICANT CHANGE UP (ref 1.8–2.4)
MAGNESIUM SERPL-MCNC: 2.4 MG/DL — SIGNIFICANT CHANGE UP (ref 1.8–2.4)
MCHC RBC-ENTMCNC: 26 PG — LOW (ref 27–31)
MCHC RBC-ENTMCNC: 32.6 G/DL — SIGNIFICANT CHANGE UP (ref 32–37)
MCV RBC AUTO: 79.9 FL — LOW (ref 81–99)
MONOCYTES # BLD AUTO: 0.86 K/UL — HIGH (ref 0.1–0.6)
MONOCYTES NFR BLD AUTO: 15.6 % — HIGH (ref 1.7–9.3)
NEUTROPHILS # BLD AUTO: 4.07 K/UL — SIGNIFICANT CHANGE UP (ref 1.4–6.5)
NEUTROPHILS NFR BLD AUTO: 74 % — SIGNIFICANT CHANGE UP (ref 42.2–75.2)
NRBC # BLD: 0 /100 WBCS — SIGNIFICANT CHANGE UP (ref 0–0)
PHOSPHATE SERPL-MCNC: 2.7 MG/DL — SIGNIFICANT CHANGE UP (ref 2.1–4.9)
PHOSPHATE SERPL-MCNC: 3.6 MG/DL — SIGNIFICANT CHANGE UP (ref 2.1–4.9)
PLATELET # BLD AUTO: 168 K/UL — SIGNIFICANT CHANGE UP (ref 130–400)
PMV BLD: 11.5 FL — HIGH (ref 7.4–10.4)
POTASSIUM SERPL-MCNC: 3.9 MMOL/L — SIGNIFICANT CHANGE UP (ref 3.5–5)
POTASSIUM SERPL-MCNC: 4.2 MMOL/L — SIGNIFICANT CHANGE UP (ref 3.5–5)
POTASSIUM SERPL-SCNC: 3.9 MMOL/L — SIGNIFICANT CHANGE UP (ref 3.5–5)
POTASSIUM SERPL-SCNC: 4.2 MMOL/L — SIGNIFICANT CHANGE UP (ref 3.5–5)
RBC # BLD: 5.07 M/UL — SIGNIFICANT CHANGE UP (ref 4.2–5.4)
RBC # FLD: 14.6 % — HIGH (ref 11.5–14.5)
SODIUM SERPL-SCNC: 127 MMOL/L — LOW (ref 135–146)
SODIUM SERPL-SCNC: 128 MMOL/L — LOW (ref 135–146)
SPECIMEN SOURCE: SIGNIFICANT CHANGE UP
WBC # BLD: 5.5 K/UL — SIGNIFICANT CHANGE UP (ref 4.8–10.8)
WBC # FLD AUTO: 5.5 K/UL — SIGNIFICANT CHANGE UP (ref 4.8–10.8)

## 2024-07-31 PROCEDURE — 74021 RADEX ABDOMEN 3+ VIEWS: CPT | Mod: 26

## 2024-07-31 PROCEDURE — 99232 SBSQ HOSP IP/OBS MODERATE 35: CPT | Mod: 24,25

## 2024-07-31 PROCEDURE — 74018 RADEX ABDOMEN 1 VIEW: CPT | Mod: 26,76

## 2024-07-31 RX ORDER — IOHEXOL 240 MG/ML
30 INJECTION, SOLUTION INTRATHECAL; INTRAVASCULAR; INTRAVENOUS; ORAL ONCE
Refills: 0 | Status: COMPLETED | OUTPATIENT
Start: 2024-07-31 | End: 2024-07-31

## 2024-07-31 RX ORDER — DIPHENHYDRAMINE HCL 25 MG
25 CAPSULE ORAL ONCE
Refills: 0 | Status: COMPLETED | OUTPATIENT
Start: 2024-07-31 | End: 2024-07-31

## 2024-07-31 RX ADMIN — Medication 100 MILLILITER(S): at 11:15

## 2024-07-31 RX ADMIN — IOHEXOL 30 MILLILITER(S): 240 INJECTION, SOLUTION INTRATHECAL; INTRAVASCULAR; INTRAVENOUS; ORAL at 11:10

## 2024-07-31 RX ADMIN — Medication 25 MILLIGRAM(S): at 10:15

## 2024-07-31 RX ADMIN — Medication 4 MILLIGRAM(S): at 03:06

## 2024-07-31 RX ADMIN — ENOXAPARIN SODIUM 40 MILLIGRAM(S): 120 INJECTION SUBCUTANEOUS at 21:47

## 2024-07-31 RX ADMIN — PANTOPRAZOLE SODIUM 40 MILLIGRAM(S): 20 TABLET, DELAYED RELEASE ORAL at 11:15

## 2024-07-31 NOTE — PROVIDER CONTACT NOTE (OTHER) - ACTION/TREATMENT ORDERED:
Provider stated team is rounding now, will f/u
Provider aware, team will round on patient
Provider stated pt should receive tap water enemas 4 times daily.
okay to give enema, administer zofran as ordered, flush NGT to check patency. okay to clamp NGT for CT scan and ambulation
MD notified, prn zofran given as ordered
MD will come assess the pt
Provider aware of all the above, will review chart and place orders as appropriate
Provider notified of the above, team coming to place NGT/

## 2024-07-31 NOTE — PHARMACOTHERAPY INTERVENTION NOTE - COMMENTS
Messaged MD regarding patient allergy to iohexol, recommended pre- meds of benadryl/ steroids, MD put in benadryl, aware of patient allergy, says po contrast should be fine. monitoring patient

## 2024-07-31 NOTE — PROVIDER CONTACT NOTE (OTHER) - DATE AND TIME:
30-Jul-2024 09:00
30-Jul-2024 14:00
31-Jul-2024 00:17
31-Jul-2024 03:09
30-Jul-2024 11:10
30-Jul-2024 15:15
30-Jul-2024 16:39
30-Jul-2024 13:32

## 2024-07-31 NOTE — PROGRESS NOTE ADULT - SUBJECTIVE AND OBJECTIVE BOX
GENERAL SURGERY PROGRESS NOTE    Patient: RHONDA WOODRUFF , 74y ([birth year])Female   MRN: [id number]  Location: [address]  Visit: 07-30-24 Inpatient  Date: 07-31-24 @ 07:36    Hospital Day #:4    Procedure/Dx/Injuries: SBO S/P NGT Placement    Events of past 24 hours: Patient vomited multiple times and was given Zofran As of 07/31 morning patient reported she feels better.     PAST MEDICAL & SURGICAL HISTORY:  Osteoporosis  Multiple closed and open fractures  Toe fracture, right  Dry eyes  Retinal tear, left  Elbow fracture, left  S/P appendectomy      Vitals:   T(F): 97.9 (07-31-24 @ 04:22), Max: 97.9 (07-31-24 @ 04:22)  HR: 58 (07-31-24 @ 04:22)  BP: 126/72 (07-31-24 @ 04:22)  RR: 18 (07-31-24 @ 04:22)  SpO2: 99% (07-31-24 @ 04:22)      Diet, NPO:   Except Medications     Special Instructions for Nursing:  Except Medications      Fluids: sodium chloride 0.9%.: Solution, 1000 milliLiter(s) infuse at 100 mL/Hr  Provider's Contact #: [phone]      I & O's:    07-30-24 @ 07:01  -  07-31-24 @ 07:00  --------------------------------------------------------  IN:    IV PiggyBack: 72.5 mL    Lactated Ringers: 180 mL    sodium chloride 0.9%: 900 mL  Total IN: 1152.5 mL    OUT:    Nasogastric/Oral tube (mL): 450 mL    Voided (mL): 2050 mL  Total OUT: 2500 mL    Total NET: -1347.5 mL    Bowel Movement: : [x] NO  Flatus: : [x] NO    PHYSICAL EXAM:  General: NAD, AAOx3, calm and cooperative  HEENT: NCAT, CHAGO, EOMI, Trachea ML, Neck supple  Cardiac: RRR S1, S2, no Murmurs, rubs or gallops  Respiratory: CTAB, normal respiratory effort, breath sounds equal BL, no wheeze, rhonchi or crackles  Abdomen: Soft, moderately distended, mild diffuse tenderness to palpation, worst in RLQ, no rebound, no guarding.   Musculoskeletal: Strength 5/5 BL UE/LE, ROM intact, compartments soft  Neuro: Sensation grossly intact and equal throughout, no focal deficits  Vascular: Pulses 2+ throughout, extremities well perfused  Skin: Warm/dry, normal color, no jaundice    MEDICATIONS  (STANDING):  enoxaparin Injectable 40 milliGRAM(s) SubCutaneous every 24 hours  pantoprazole  Injectable 40 milliGRAM(s) IV Push daily  sodium chloride 0.9%. 1000 milliLiter(s) (100 mL/Hr) IV Continuous <Continuous>    MEDICATIONS  (PRN):  ondansetron Injectable 4 milliGRAM(s) IV Push every 6 hours PRN Nausea      DVT PROPHYLAXIS: enoxaparin Injectable 40 milliGRAM(s) SubCutaneous every 24 hours    GI PROPHYLAXIS: pantoprazole  Injectable 40 milliGRAM(s) IV Push daily      LAB/STUDIES:  Labs:  CAPILLARY BLOOD GLUCOSE                          13.1   10.93 )-----------( 162      ( 30 Jul 2024 21:16 )             40.0         07-30    127<L>  |  90<L>  |  13  ----------------------------<  140<H>  4.2   |  19  |  0.8      Calcium: 9.1 mg/dL (07-30-24 @ 21:16)      LFTs:             7.1  | 0.5  | 25       ------------------[137     ( 30 Jul 2024 00:07 )  5.0  | x    | 21          Lipase:15     Amylase:x         Lactate, Blood: 2.0 mmol/L (07-30-24 @ 00:07)    Urinalysis Basic - ( 30 Jul 2024 21:16 )    Color: x / Appearance: x / SG: x / pH: x  Gluc: 140 mg/dL / Ketone: x  / Bili: x / Urobili: x   Blood: x / Protein: x / Nitrite: x   Leuk Esterase: x / RBC: x / WBC x   Sq Epi: x / Non Sq Epi: x / Bacteria: x    Culture - Urine (collected 30 Jul 2024 02:20)  Source: Clean Catch Clean Catch (Midstream)  Final Report (31 Jul 2024 07:17):    <10,000 CFU/mL Normal Urogenital Lucinda      IMAGING:    < from: Xray Abdomen Minimum 3 Views (07.30.24 @ 12:27) >  here may be a small amount of oral contrast within the left colon.    Again seen are prominent small bowel loops overlying the lower abdomen   measuring up to 3.4 cm.    Large colonic stool load.    Previously noted retrocardiac masslike process within the chest is not   well-seen on this examination. Findings may berelated to differences in   technique.    However, chest CT is recommended based on the prior findings.    Stable cardiomediastinal silhouette.      ASSESSMENT:  74F w/ pmhx of appendectomy, GERD, osteoporosis, left elbow surgery, recent right foot surgery for hammer toe who presents complaining of abdominal pain, nausea, vomiting that began yesterday, but acutely worsened today around 2PM. Since that time the patient has had "several" episodes of nausea and vomiting that started out as food contents, and transitioned to stomach acid. She endorses bowel movements yesterday and today, no diarrhea, both normal in consistency. She denies fevers, chest pain shortness of breath, or palpitations at this time.       PLAN:  - Monitor NGT output.  - Serial abdominal exam.  - Monitor her vomiting and pain.     TRAUMA SURGERY SPECTRA: [id number]

## 2024-07-31 NOTE — PROVIDER CONTACT NOTE (OTHER) - SITUATION
Pt had an episode of vomiting, brown in color. NG tube output has been 50mL since 1900. Pt is complaining of sharp pain in her stomach. Enema was administered earlier, no bm yet.

## 2024-08-01 LAB
ANION GAP SERPL CALC-SCNC: 17 MMOL/L — HIGH (ref 7–14)
APTT BLD: 27.2 SEC — SIGNIFICANT CHANGE UP (ref 27–39.2)
BASOPHILS # BLD AUTO: 0.01 K/UL — SIGNIFICANT CHANGE UP (ref 0–0.2)
BASOPHILS NFR BLD AUTO: 0.1 % — SIGNIFICANT CHANGE UP (ref 0–1)
BLD GP AB SCN SERPL QL: SIGNIFICANT CHANGE UP
BUN SERPL-MCNC: 18 MG/DL — SIGNIFICANT CHANGE UP (ref 10–20)
CALCIUM SERPL-MCNC: 8.7 MG/DL — SIGNIFICANT CHANGE UP (ref 8.4–10.5)
CHLORIDE SERPL-SCNC: 95 MMOL/L — LOW (ref 98–110)
CO2 SERPL-SCNC: 21 MMOL/L — SIGNIFICANT CHANGE UP (ref 17–32)
CREAT SERPL-MCNC: 0.7 MG/DL — SIGNIFICANT CHANGE UP (ref 0.7–1.5)
EGFR: 91 ML/MIN/1.73M2 — SIGNIFICANT CHANGE UP
EOSINOPHIL # BLD AUTO: 0 K/UL — SIGNIFICANT CHANGE UP (ref 0–0.7)
EOSINOPHIL NFR BLD AUTO: 0 % — SIGNIFICANT CHANGE UP (ref 0–8)
GLUCOSE SERPL-MCNC: 103 MG/DL — HIGH (ref 70–99)
HCT VFR BLD CALC: 39.7 % — SIGNIFICANT CHANGE UP (ref 37–47)
HGB BLD-MCNC: 13.3 G/DL — SIGNIFICANT CHANGE UP (ref 12–16)
IMM GRANULOCYTES NFR BLD AUTO: 0.4 % — HIGH (ref 0.1–0.3)
INR BLD: 1.2 RATIO — SIGNIFICANT CHANGE UP (ref 0.65–1.3)
LYMPHOCYTES # BLD AUTO: 0.39 K/UL — LOW (ref 1.2–3.4)
LYMPHOCYTES # BLD AUTO: 5.5 % — LOW (ref 20.5–51.1)
MAGNESIUM SERPL-MCNC: 2.2 MG/DL — SIGNIFICANT CHANGE UP (ref 1.8–2.4)
MCHC RBC-ENTMCNC: 26.9 PG — LOW (ref 27–31)
MCHC RBC-ENTMCNC: 33.5 G/DL — SIGNIFICANT CHANGE UP (ref 32–37)
MCV RBC AUTO: 80.2 FL — LOW (ref 81–99)
MONOCYTES # BLD AUTO: 0.69 K/UL — HIGH (ref 0.1–0.6)
MONOCYTES NFR BLD AUTO: 9.7 % — HIGH (ref 1.7–9.3)
NEUTROPHILS # BLD AUTO: 5.97 K/UL — SIGNIFICANT CHANGE UP (ref 1.4–6.5)
NEUTROPHILS NFR BLD AUTO: 84.3 % — HIGH (ref 42.2–75.2)
NRBC # BLD: 0 /100 WBCS — SIGNIFICANT CHANGE UP (ref 0–0)
PHOSPHATE SERPL-MCNC: 3.1 MG/DL — SIGNIFICANT CHANGE UP (ref 2.1–4.9)
PLATELET # BLD AUTO: 158 K/UL — SIGNIFICANT CHANGE UP (ref 130–400)
PMV BLD: 12.3 FL — HIGH (ref 7.4–10.4)
POTASSIUM SERPL-MCNC: 3.4 MMOL/L — LOW (ref 3.5–5)
POTASSIUM SERPL-SCNC: 3.4 MMOL/L — LOW (ref 3.5–5)
PROTHROM AB SERPL-ACNC: 13.7 SEC — HIGH (ref 9.95–12.87)
RBC # BLD: 4.95 M/UL — SIGNIFICANT CHANGE UP (ref 4.2–5.4)
RBC # FLD: 14.6 % — HIGH (ref 11.5–14.5)
SODIUM SERPL-SCNC: 133 MMOL/L — LOW (ref 135–146)
WBC # BLD: 7.09 K/UL — SIGNIFICANT CHANGE UP (ref 4.8–10.8)
WBC # FLD AUTO: 7.09 K/UL — SIGNIFICANT CHANGE UP (ref 4.8–10.8)

## 2024-08-01 PROCEDURE — 74176 CT ABD & PELVIS W/O CONTRAST: CPT | Mod: 26

## 2024-08-01 PROCEDURE — 99232 SBSQ HOSP IP/OBS MODERATE 35: CPT | Mod: 24,25

## 2024-08-01 PROCEDURE — 99221 1ST HOSP IP/OBS SF/LOW 40: CPT

## 2024-08-01 RX ORDER — DIPHENHYDRAMINE HCL 25 MG
25 CAPSULE ORAL ONCE
Refills: 0 | Status: COMPLETED | OUTPATIENT
Start: 2024-08-01 | End: 2024-08-01

## 2024-08-01 RX ORDER — IOHEXOL 240 MG/ML
30 INJECTION, SOLUTION INTRATHECAL; INTRAVASCULAR; INTRAVENOUS; ORAL ONCE
Refills: 0 | Status: COMPLETED | OUTPATIENT
Start: 2024-08-01 | End: 2024-08-01

## 2024-08-01 RX ORDER — BACTERIOSTATIC SODIUM CHLORIDE 0.9 %
1000 VIAL (ML) INJECTION
Refills: 0 | Status: DISCONTINUED | OUTPATIENT
Start: 2024-08-01 | End: 2024-08-02

## 2024-08-01 RX ADMIN — ENOXAPARIN SODIUM 40 MILLIGRAM(S): 120 INJECTION SUBCUTANEOUS at 21:09

## 2024-08-01 RX ADMIN — IOHEXOL 30 MILLILITER(S): 240 INJECTION, SOLUTION INTRATHECAL; INTRAVASCULAR; INTRAVENOUS; ORAL at 11:18

## 2024-08-01 RX ADMIN — Medication 25 MILLIGRAM(S): at 11:17

## 2024-08-01 RX ADMIN — Medication 100 MILLILITER(S): at 19:43

## 2024-08-01 RX ADMIN — Medication 4 MILLIGRAM(S): at 15:35

## 2024-08-01 RX ADMIN — Medication 100 MILLILITER(S): at 00:03

## 2024-08-01 RX ADMIN — Medication 4 MILLIGRAM(S): at 00:03

## 2024-08-01 RX ADMIN — PANTOPRAZOLE SODIUM 40 MILLIGRAM(S): 20 TABLET, DELAYED RELEASE ORAL at 11:18

## 2024-08-01 NOTE — PROGRESS NOTE ADULT - SUBJECTIVE AND OBJECTIVE BOX
GENERAL SURGERY PROGRESS NOTE    Patient: RHONDA WOODRUFF , 74y ([birth year])Female   MRN: [id number]  Location: [place] [address] [address]  Visit: 07-30-24 Inpatient  Date: 08-01-24 @ 02:17    Hospital Day #: 4    Events of past 24 hours:    Pt has been seen and examen at the bedside. No acute events overnight. Denies N/V, no BM or gas. On examination abd in distended pt complains of generalized pain, but mansions it less in severity compare to day before. KUB was done, will f/u on final read. NG tube in place and to wall suction.     PAST MEDICAL & SURGICAL HISTORY:  Osteoporosis  Multiple closed and open fractures  Toe fracture, right  Dry eyes  Retinal tear, left  Elbow fracture, left  S/P appendectomy    Vitals:   T(F): 98.2 (07-31-24 @ 23:00), Max: 98.2 (07-31-24 @ 07:40)  HR: 62 (07-31-24 @ 23:00)  BP: 129/73 (07-31-24 @ 23:00)  RR: 18 (07-31-24 @ 23:00)  SpO2: 98% (07-31-24 @ 23:00)      Diet, NPO:   Except Medications     Special Instructions for Nursing:  Except Medications    Fluids:     I & O's:    07-30-24 @ 07:01  -  07-31-24 @ 07:00  --------------------------------------------------------  IN:    IV PiggyBack: 72.5 mL    Lactated Ringers: 180 mL    sodium chloride 0.9%: 900 mL  Total IN: 1152.5 mL    OUT:    Nasogastric/Oral tube (mL): 450 mL    Voided (mL): 2050 mL  Total OUT: 2500 mL    Total NET: -1347.5 mL    Bowel Movement: : [] YES [x] NO  Flatus: : [] YES [x] NO    PHYSICAL EXAM:  General: NAD, calm and cooperative  HEENT: NG tube in place, NCAT, EOMI, Trachea ML, Neck supple  Cardiac: RRR  Respiratory: No accessory muscle use, b/l chest rise and fall, normal respiratory effort  Abdomen: Distended, non-tender  Musculoskeletal: ROM intact, compartments soft  Vascular: Extremities well perfused  Skin: Warm/dry, normal color, no jaundice    MEDICATIONS  (STANDING):  enoxaparin Injectable 40 milliGRAM(s) SubCutaneous every 24 hours  pantoprazole  Injectable 40 milliGRAM(s) IV Push daily  sodium chloride 0.9%. 1000 milliLiter(s) (100 mL/Hr) IV Continuous <Continuous>    MEDICATIONS  (PRN):  ondansetron Injectable 4 milliGRAM(s) IV Push every 6 hours PRN Nausea    DVT PROPHYLAXIS: enoxaparin Injectable 40 milliGRAM(s) SubCutaneous every 24 hours    GI PROPHYLAXIS: pantoprazole  Injectable 40 milliGRAM(s) IV Push daily    LAB/STUDIES:  Labs:  CAPILLARY BLOOD GLUCOSE                       13.2   5.50  )-----------( 168      ( 31 Jul 2024 20:00 )             40.5       Auto Neutrophil %: 74.0 % (07-31-24 @ 20:00)  Auto Immature Granulocyte %: 0.2 % (07-31-24 @ 20:00)    07-31    128<L>  |  95<L>  |  14  ----------------------------<  121<H>  3.9   |  24  |  0.7      Calcium: 9.0 mg/dL (07-31-24 @ 20:00)      LFTs:     Lactate, Blood: 2.0 mmol/L (07-30-24 @ 00:07)    Urinalysis Basic - ( 31 Jul 2024 20:00 )    Color: x / Appearance: x / SG: x / pH: x  Gluc: 121 mg/dL / Ketone: x  / Bili: x / Urobili: x   Blood: x / Protein: x / Nitrite: x   Leuk Esterase: x / RBC: x / WBC x   Sq Epi: x / Non Sq Epi: x / Bacteria: x    Culture - Urine (collected 30 Jul 2024 02:20)  Source: Clean Catch Clean Catch (Midstream)  Final Report (31 Jul 2024 07:17):    <10,000 CFU/mL Normal Urogenital Lucinda

## 2024-08-01 NOTE — PROGRESS NOTE ADULT - ASSESSMENT
ASSESSMENT:  74F w/ pmhx of appendectomy, GERD, osteoporosis, left elbow surgery, recent right foot surgery for hammer toe who presents complaining of abdominal pain, nausea, vomiting       PLAN:  - Monitor vitals  - Monitor electrolytes, replete if needed  - Monitor NGT output.  - Serial abdominal exam.  - F/u on final KUB read      [phone]

## 2024-08-02 ENCOUNTER — RESULT REVIEW (OUTPATIENT)
Age: 74
End: 2024-08-02

## 2024-08-02 PROCEDURE — 88307 TISSUE EXAM BY PATHOLOGIST: CPT | Mod: 26

## 2024-08-02 PROCEDURE — 99232 SBSQ HOSP IP/OBS MODERATE 35: CPT

## 2024-08-02 PROCEDURE — 44120 REMOVAL OF SMALL INTESTINE: CPT | Mod: 22

## 2024-08-02 RX ORDER — ONDANSETRON HCL/PF 4 MG/2 ML
4 VIAL (ML) INJECTION EVERY 6 HOURS
Refills: 0 | Status: DISCONTINUED | OUTPATIENT
Start: 2024-08-02 | End: 2024-08-09

## 2024-08-02 RX ORDER — PANTOPRAZOLE SODIUM 20 MG/1
40 TABLET, DELAYED RELEASE ORAL DAILY
Refills: 0 | Status: DISCONTINUED | OUTPATIENT
Start: 2024-08-02 | End: 2024-08-09

## 2024-08-02 RX ORDER — FENTANYL CITRATE 1200 UG/1
25 LOZENGE ORAL; TRANSMUCOSAL
Refills: 0 | Status: DISCONTINUED | OUTPATIENT
Start: 2024-08-02 | End: 2024-08-03

## 2024-08-02 RX ORDER — ENOXAPARIN SODIUM 120 MG/.8ML
40 INJECTION SUBCUTANEOUS EVERY 24 HOURS
Refills: 0 | Status: DISCONTINUED | OUTPATIENT
Start: 2024-08-02 | End: 2024-08-09

## 2024-08-02 RX ORDER — ONDANSETRON HCL/PF 4 MG/2 ML
4 VIAL (ML) INJECTION ONCE
Refills: 0 | Status: COMPLETED | OUTPATIENT
Start: 2024-08-02 | End: 2024-08-02

## 2024-08-02 RX ORDER — POTASSIUM CHLORIDE 1500 MG/1
20 TABLET, EXTENDED RELEASE ORAL
Refills: 0 | Status: COMPLETED | OUTPATIENT
Start: 2024-08-02 | End: 2024-08-02

## 2024-08-02 RX ORDER — HYDROMORPHONE HCL IN 0.9% NACL 0.2 MG/ML
0.25 PLASTIC BAG, INJECTION (ML) INTRAVENOUS
Refills: 0 | Status: DISCONTINUED | OUTPATIENT
Start: 2024-08-02 | End: 2024-08-03

## 2024-08-02 RX ORDER — DEXTROSE MONOHYDRATE, SODIUM CHLORIDE, SODIUM LACTATE, CALCIUM CHLORIDE, MAGNESIUM CHLORIDE 1.5; 538; 448; 18.4; 5.08 G/100ML; MG/100ML; MG/100ML; MG/100ML; MG/100ML
1000 SOLUTION INTRAPERITONEAL
Refills: 0 | Status: DISCONTINUED | OUTPATIENT
Start: 2024-08-02 | End: 2024-08-03

## 2024-08-02 RX ORDER — ASPIRIN 325 MG
10 TABLET ORAL
Refills: 0 | Status: DISCONTINUED | OUTPATIENT
Start: 2024-08-02 | End: 2024-08-05

## 2024-08-02 RX ORDER — ACETAMINOPHEN 500 MG
725 TABLET ORAL ONCE
Refills: 0 | Status: COMPLETED | OUTPATIENT
Start: 2024-08-02 | End: 2024-08-02

## 2024-08-02 RX ADMIN — Medication 4 MILLIGRAM(S): at 23:04

## 2024-08-02 RX ADMIN — Medication 1 SPRAY(S): at 05:06

## 2024-08-02 RX ADMIN — Medication 10 MILLIGRAM(S): at 23:42

## 2024-08-02 RX ADMIN — DEXTROSE MONOHYDRATE, SODIUM CHLORIDE, SODIUM LACTATE, CALCIUM CHLORIDE, MAGNESIUM CHLORIDE 75 MILLILITER(S): 1.5; 538; 448; 18.4; 5.08 SOLUTION INTRAPERITONEAL at 23:04

## 2024-08-02 RX ADMIN — POTASSIUM CHLORIDE 50 MILLIEQUIVALENT(S): 1500 TABLET, EXTENDED RELEASE ORAL at 02:43

## 2024-08-02 RX ADMIN — Medication 0.25 MILLIGRAM(S): at 22:30

## 2024-08-02 RX ADMIN — Medication 100 MILLIGRAM(S): at 23:42

## 2024-08-02 RX ADMIN — Medication 290 MILLIGRAM(S): at 22:56

## 2024-08-02 RX ADMIN — PANTOPRAZOLE SODIUM 40 MILLIGRAM(S): 20 TABLET, DELAYED RELEASE ORAL at 11:27

## 2024-08-02 RX ADMIN — ENOXAPARIN SODIUM 40 MILLIGRAM(S): 120 INJECTION SUBCUTANEOUS at 23:42

## 2024-08-02 RX ADMIN — POTASSIUM CHLORIDE 50 MILLIEQUIVALENT(S): 1500 TABLET, EXTENDED RELEASE ORAL at 05:03

## 2024-08-02 NOTE — PRE-ANESTHESIA EVALUATION ADULT - NSANTHPMHFT_GEN_ALL_CORE
74F w/ pmhx of appendectomy, GERD, osteoporosis, left elbow surgery, recent right foot surgery for hammer toe who presents complaining of abdominal pain, nausea, vomiting. Pt not passing BM's or flatus for several days. Imaging consistent with partial SBO. Pt is NPO with NGT to LIS.    Full ADLs, very active with cardio classes 5d/wk up to 2 weeks ago.

## 2024-08-02 NOTE — CHART NOTE - NSCHARTNOTEFT_GEN_A_CORE
PACU ANESTHESIA ADMISSION NOTE      Procedure:   Post op diagnosis:      ____  Intubated  TV:______       Rate: ______      FiO2: ______    _x___  Patent Airway    __x__  Full return of protective reflexes    _x___  Full recovery from anesthesia / back to baseline     Vitals:   T: 98          R:12                  BP:  112/78                Sat: 99                  P: 88      Mental Status:  ___x_ Awake   __x___ Alert   _____ Drowsy   _____ Sedated    Nausea/Vomiting:  __x__ NO  ______Yes,   See Post - Op Orders          Pain Scale (0-10):  _____    Treatment: __x__ None    ____ See Post - Op/PCA Orders    Post - Operative Fluids:   ____ Oral   _x___ See Post - Op Orders    Plan: Discharge:   ____Home       __x___Floor     _____Critical Care    _____  Other:_________________    Comments:

## 2024-08-02 NOTE — BRIEF OPERATIVE NOTE - OPERATION/FINDINGS
lower abdomen incision, exploratory laparotomy, abdominal ascites, dilated loops of small bowel with abrupt transition approximately 20cm from TI due to large adhesive band causing a closed loop obstruction/internal hernia. once band was lysed, a stricture of the small bowel was noted and necessitated a resection. approximately 30cm resected and creation of side to side antiperistaltic anastomosis using 80 JESICA and 90 TA stapler, and closure of mesenteric defect

## 2024-08-02 NOTE — BRIEF OPERATIVE NOTE - NSICDXBRIEFPOSTOP_GEN_ALL_CORE_FT
POST-OP DIAGNOSIS:  Small bowel obstruction 02-Aug-2024 21:52:58  Susan Prado  Internal hernia 02-Aug-2024 21:53:26  Susan Prado

## 2024-08-02 NOTE — PROGRESS NOTE ADULT - SUBJECTIVE AND OBJECTIVE BOX
GENERAL SURGERY PROGRESS NOTE     RHONDA WOODRUFF  [address] [place] [address] day :4d    POD:  Procedure:   Surgical Attending: Francisco Guzman  Overnight events: No acute events overnight. Pt is scheduled for Ex lap, possible bowel resection, possible ostomy. She has NGT in place to low suction draining bilious output.     T(F): 98.2 (08-01-24 @ 23:17), Max: 98.8 (08-01-24 @ 15:47)  HR: 65 (08-01-24 @ 23:17) (61 - 83)  BP: 114/73 (08-01-24 @ 23:17) (114/73 - 148/80)  ABP: --  ABP(mean): --  RR: 18 (08-01-24 @ 23:17) (18 - 18)  SpO2: 97% (08-01-24 @ 23:17) (95% - 98%)    IN'S / OUT's:    07-31-24 @ 07:01  -  08-01-24 @ 07:00  --------------------------------------------------------  IN:    sodium chloride 0.9%: 1200 mL  Total IN: 1200 mL    OUT:    Nasogastric/Oral tube (mL): 375 mL    Voided (mL): 1450 mL  Total OUT: 1825 mL    Total NET: -625 mL      08-01-24 @ 07:01  -  08-02-24 @ 00:56  --------------------------------------------------------  IN:  Total IN: 0 mL    OUT:    Nasogastric/Oral tube (mL): 100 mL  Total OUT: 100 mL    Total NET: -100 mL          PHYSICAL EXAM:  GENERAL: NAD, NGT in place  CHEST/LUNG: equal chest rise b/l  HEART: Regular rate and rhythm  ABDOMEN: Soft, Mildly tender to palpation, Moderately distended  EXTREMITIES:  No clubbing, cyanosis, or edema      LABS  Labs:  CAPILLARY BLOOD GLUCOSE                              13.3   7.09  )-----------( 158      ( 01 Aug 2024 22:32 )             39.7       Auto Neutrophil %: 84.3 % (08-01-24 @ 22:32)  Auto Immature Granulocyte %: 0.4 % (08-01-24 @ 22:32)    08-01    133<L>  |  95<L>  |  18  ----------------------------<  103<H>  3.4<L>   |  21  |  0.7      Calcium: 8.7 mg/dL (08-01-24 @ 22:32)      LFTs:         Coags:     13.70  ----< 1.20    ( 01 Aug 2024 22:32 )     27.2                Urinalysis Basic - ( 01 Aug 2024 22:32 )    Color: x / Appearance: x / SG: x / pH: x  Gluc: 103 mg/dL / Ketone: x  / Bili: x / Urobili: x   Blood: x / Protein: x / Nitrite: x   Leuk Esterase: x / RBC: x / WBC x   Sq Epi: x / Non Sq Epi: x / Bacteria: x        Culture - Urine (collected 30 Jul 2024 02:20)  Source: Clean Catch Clean Catch (Midstream)  Final Report (31 Jul 2024 07:17):    <10,000 CFU/mL Normal Urogenital Lucinda          RADIOLOGY & ADDITIONAL TESTS:      A/P:  RHONDA WOODRUFF is a 74F w/ pmhx of appendectomy, GERD, osteoporosis, left elbow surgery, recent right foot surgery for hammer toe who presents complaining of abdominal pain, nausea, vomiting       PLAN:   - Pt is planned to go to the OR 8/2 for Exploratory laparotomy, poss bowel resection, possible ostomy  - Keep NPO at midnight  - IVF for hydration  - continue NGT to low continuous suction, monitor output  - daily labs, replete electrolytes as needed   - multi modal pain control  - DVT and GI ppx        #DVT ppx: enoxaparin Injectable 40 milliGRAM(s) SubCutaneous every 24 hours    #GI ppx: pantoprazole  Injectable 40 milliGRAM(s) IV Push daily    Disposition:  4C    Above plan to be discussed with Attending Surgeon Dr. Powell  , patient, patient family, and rest of health care team    TAP (Trauma, Acute care, Pediatrics) Spectra [phone]   GENERAL SURGERY PROGRESS NOTE     RHONDA WOODRUFF  [address] [place] [address] day :4d    POD:  Procedure:   Surgical Attending: Francisco Guzman  Overnight events: No acute events overnight. Pt is scheduled for Ex lap, possible bowel resection, possible ostomy. She has NGT in place to low suction draining bilious output.     T(F): 98.2 (08-01-24 @ 23:17), Max: 98.8 (08-01-24 @ 15:47)  HR: 65 (08-01-24 @ 23:17) (61 - 83)  BP: 114/73 (08-01-24 @ 23:17) (114/73 - 148/80)  ABP: --  ABP(mean): --  RR: 18 (08-01-24 @ 23:17) (18 - 18)  SpO2: 97% (08-01-24 @ 23:17) (95% - 98%)    IN'S / OUT's:    07-31-24 @ 07:01  -  08-01-24 @ 07:00  --------------------------------------------------------  IN:    sodium chloride 0.9%: 1200 mL  Total IN: 1200 mL    OUT:    Nasogastric/Oral tube (mL): 375 mL    Voided (mL): 1450 mL  Total OUT: 1825 mL    Total NET: -625 mL    08-01-24 @ 07:01  -  08-02-24 @ 00:56  --------------------------------------------------------  IN:  Total IN: 0 mL    OUT:    Nasogastric/Oral tube (mL): 100 mL  Total OUT: 100 mL    Total NET: -100 mL    PHYSICAL EXAM:  GENERAL: NAD, NGT in place  CHEST/LUNG: equal chest rise b/l  HEART: Regular rate and rhythm  ABDOMEN: Soft, Mildly tender to palpation, Moderately distended  EXTREMITIES:  No clubbing, cyanosis, or edema    LABS  CAPILLARY BLOOD GLUCOSE                        13.3   7.09  )-----------( 158      ( 01 Aug 2024 22:32 )             39.7       Auto Neutrophil %: 84.3 % (08-01-24 @ 22:32)  Auto Immature Granulocyte %: 0.4 % (08-01-24 @ 22:32)    08-01  133<L>  |  95<L>  |  18  ----------------------------<  103<H>  3.4<L>   |  21  |  0.7  Calcium: 8.7 mg/dL (08-01-24 @ 22:32)    Coags:   13.70  ----< 1.20    ( 01 Aug 2024 22:32 )     27.2      Urinalysis Basic - ( 01 Aug 2024 22:32 )  Color: x / Appearance: x / SG: x / pH: x  Gluc: 103 mg/dL / Ketone: x  / Bili: x / Urobili: x   Blood: x / Protein: x / Nitrite: x   Leuk Esterase: x / RBC: x / WBC x   Sq Epi: x / Non Sq Epi: x / Bacteria: x    Culture - Urine (collected 30 Jul 2024 02:20)  Source: Clean Catch Clean Catch (Midstream)  Final Report (31 Jul 2024 07:17):    <10,000 CFU/mL Normal Urogenital Lucinda    RADIOLOGY & ADDITIONAL TESTS:    A/P:  RHONDA WOODRUFF is a 74F w/ pmhx of appendectomy, GERD, osteoporosis, left elbow surgery, recent right foot surgery for hammer toe who presents complaining of abdominal pain, nausea, vomiting     PLAN:   - Pt is planned to go to the OR 8/2 for Exploratory laparotomy, possible bowel resection, possible ostomy  - Keep NPO at midnight  - IVF for hydration  - continue NGT to low continuous suction, monitor output  - daily labs, replete electrolytes as needed   - multi modal pain control  - DVT and GI ppx    #DVT ppx: enoxaparin Injectable 40 milliGRAM(s) SubCutaneous every 24 hours    #GI ppx: pantoprazole  Injectable 40 milliGRAM(s) IV Push daily    Disposition:  4C    Above plan to be discussed with Attending Surgeon Dr. Powell  , patient, patient family, and rest of health care team    TAP (Trauma, Acute care, Pediatrics) Spectra [phone]

## 2024-08-02 NOTE — BRIEF OPERATIVE NOTE - NSICDXBRIEFPROCEDURE_GEN_ALL_CORE_FT
PROCEDURES:  Exploratory laparotomy with small bowel resection 02-Aug-2024 21:50:59  Susan Prado  Open lysis of intestinal adhesions 02-Aug-2024 21:52:40  Susan Prado

## 2024-08-02 NOTE — PROGRESS NOTE ADULT - ASSESSMENT
74F w/ pmhx of appendectomy, GERD, osteoporosis, left elbow surgery, recent right foot surgery for hammer toe who presents complaining of abdominal pain, nausea, vomiting. Pt not passing BM's or flatus for several days. Imaging consistent with partial SBO. Pt is NPO with NGT to LIS.       Problem list:    #Abdominal pain/Nausea/Vomiting 2/2 partial SBO  #Small B/L pleural effusions (new)  #Abdominal wall thickening possibly 2/2 Colitis   #Moderate abdominal pelvic ascites (new)  #Hx of GERD   #Hx of osteoporosis         Plan:  - serial abdominal exams   - stool count and monitor for flatus  - indication for surgical intervention per primary team (plan for ex-lap +/- resection/ostomy)  - NPO with NGT to LIS; monitor output   - Elevate HOB   - aspiration precautions   - daily KUB in the interim   - PRN pain scale   - monitor for fever, trend WBC, and other signs of infection; consider empiric ABx if indicated   - c/w Protonix 40mg IV qd  - would consider holding IVF/dec rate given new effusions and monitoring   - consider CXR in the AM to reassess pleural effusions   - incentive spirometer (pt endorsing intermittent dyspnea) 74F w/ pmhx of appendectomy, GERD, osteoporosis, left elbow surgery, recent right foot surgery for hammer toe who presents complaining of abdominal pain, nausea, vomiting. Pt not passing BM's or flatus for several days. Imaging consistent with partial SBO. Pt is NPO with NGT to LIS.       Problem list:    #Abdominal pain/Nausea/Vomiting 2/2 partial SBO  #Small B/L pleural effusions (new)  #Abdominal wall thickening possibly 2/2 Colitis   #Moderate abdominal pelvic ascites (new)  #Hx of GERD   #Hx of osteoporosis   #Borderline hypokalemia         Plan:  - serial abdominal exams   - stool count and monitor for flatus  - indication for surgical intervention per primary team (plan for ex-lap +/- resection/ostomy)  - NPO with NGT to LIS; monitor output   - Elevate HOB   - aspiration precautions   - daily KUB in the interim   - PRN pain scale   - monitor for fever, trend WBC, and other signs of infection; consider empiric ABx if indicated   - c/w Protonix 40mg IV qd  - would consider holding IVF/dec rate given new effusions and monitoring   - consider CXR in the AM to reassess pleural effusions   - incentive spirometer (pt endorsing intermittent dyspnea)  - monitor electrolytes daily, consider KCl IV 20meq x1  74F w/ pmhx of appendectomy, GERD, osteoporosis, left elbow surgery, recent right foot surgery for hammer toe who presents complaining of abdominal pain, nausea, vomiting. Pt not passing BM's or flatus for several days. Imaging consistent with partial SBO. Pt is NPO with NGT to LIS.       Problem list:    #Abdominal pain/Nausea/Vomiting 2/2 partial SBO  #Small B/L pleural effusions (new)  #Abdominal wall thickening possibly 2/2 Colitis   #Moderate abdominal pelvic ascites (new)  #Hx of GERD   #Hx of osteoporosis   #Borderline hypokalemia         Plan:  - serial abdominal exams   - stool count and monitor for flatus  - indication for surgical intervention per primary team (plan for ex-lap +/- resection/ostomy)  - NPO with NGT to LIS; monitor output   - Elevate HOB   - aspiration precautions   - daily KUB in the interim   - PRN pain scale   - monitor for fever, trend WBC, and other signs of infection; consider empiric ABx if indicated   - c/w Protonix 40mg IV qd  - would consider holding IVF/dec rate given new effusions and monitoring   - consider CXR in the AM to reassess pleural effusions   - incentive spirometer (pt endorsing intermittent dyspnea)  - monitor electrolytes daily, consider KCl IV 20meq x1   - PT / ambulate daily 74F w/ pmhx of appendectomy, GERD, osteoporosis, left elbow surgery, recent right foot surgery for hammer toe who presents complaining of abdominal pain, nausea, vomiting. Pt not passing BM's or flatus for several days. Imaging consistent with partial SBO. Pt is NPO with NGT to LIS.       Problem list:    #Abdominal pain/Nausea/Vomiting 2/2 partial SBO  #Small B/L pleural effusions (new)  #Abdominal wall thickening possibly 2/2 Colitis   #Moderate abdominal pelvic ascites (new)  #Hx of GERD   #Hx of osteoporosis   #Borderline hypokalemia         Plan:  - serial abdominal exams   - stool count and monitor for flatus  - indication for surgical intervention per primary team (plan for ex-lap +/- resection/ostomy)  - NPO with NGT to LIS; monitor output   - Elevate HOB   - aspiration precautions   - daily KUB in the interim   - PRN pain scale   - monitor for fever, trend WBC, and other signs of infection; consider empiric ABx if indicated   - c/w Protonix 40mg IV qd  - would consider holding IVF/dec rate given new effusions and monitoring   - consider CXR in the AM to reassess pleural effusions   - incentive spirometer (pt endorsing intermittent dyspnea)  - monitor electrolytes daily, consider KCl IV 20meq x1   - PT / ambulate daily      Plan of care discussed with patient and primary team.     Total time spent to complete patient's bedside assessment, reviewed medical chart, discussed medical plan of care with team was more than 35 minutes with >50% of time spent face to face with patient, discussion with patient/family and/or coordination of care

## 2024-08-02 NOTE — PROGRESS NOTE ADULT - SUBJECTIVE AND OBJECTIVE BOX
SUBJECTIVE:    Patient is a 74y old Female who presents with a chief complaint of Abdominal pain, SBO versus diffuse colonic stool burden (01 Aug 2024 02:16)    Currently admitted to medicine with the primary diagnosis of Intractable abdominal pain       Today is hospital day 3d. This morning she is resting in bed and reports no new issues or overnight events. States she wishes to ambulate more and has not had a BM/flatus.    ROS:   CONSTITUTIONAL: No weakness, fevers or chills   EYES/ENT: No visual changes; No vertigo or throat pain   NECK: No pain or stiffness   RESPIRATORY: No cough, wheezing, hemoptysis; No shortness of breath   CARDIOVASCULAR: No chest pain or palpitations   GASTROINTESTINAL: No abdominal or epigastric pain. No nausea, vomiting, or hematemesis; No diarrhea or constipation. No melena or hematochezia.  GENITOURINARY: No dysuria, frequency or hematuria  NEUROLOGICAL: No numbness or weakness  SKIN: No itching, rashes      PAST MEDICAL & SURGICAL HISTORY  Osteoporosis    Multiple closed and open fractures    Toe fracture, right    Dry eyes    Retinal tear, left    Elbow fracture, left    S/P appendectomy        SOCIAL HISTORY:  Negative for smoking/alcohol/drug use.     ALLERGIES:  latex (Pruritus)  aspirin (Vomiting)  penicillin (Hives)  contrast media (iodine-based) (Flushing)  sulfa drugs (Pruritus; Rash)  Advil (Anaphylaxis)      MEDICATIONS:  MEDICATIONS  (STANDING):  enoxaparin Injectable 40 milliGRAM(s) SubCutaneous every 24 hours  pantoprazole  Injectable 40 milliGRAM(s) IV Push daily  sodium chloride 0.9%. 1000 milliLiter(s) (100 mL/Hr) IV Continuous <Continuous>    MEDICATIONS  (PRN):  ondansetron Injectable 4 milliGRAM(s) IV Push every 6 hours PRN Nausea      VITALS:   Vital Signs Last 24 Hrs  T(C): 36.9 (02 Aug 2024 16:06), Max: 36.9 (02 Aug 2024 16:06)  T(F): 98.4 (02 Aug 2024 16:06), Max: 98.4 (02 Aug 2024 16:06)  HR: 76 (02 Aug 2024 16:06) (64 - 76)  BP: 116/68 (02 Aug 2024 16:06) (114/73 - 148/80)  BP(mean): --  RR: 18 (02 Aug 2024 16:06) (18 - 18)  SpO2: 100% (02 Aug 2024 16:06) (95% - 100%)    Parameters below as of 02 Aug 2024 16:06  Patient On (Oxygen Delivery Method): room air      LABS:                          13.3   7.09  )-----------( 158      ( 01 Aug 2024 22:32 )             39.7   08-01    133<L>  |  95<L>  |  18  ----------------------------<  103<H>  3.4<L>   |  21  |  0.7    Ca    8.7      01 Aug 2024 22:32  Phos  3.1     08-01  Mg     2.2     08-01        Urinalysis Basic - ( 31 Jul 2024 20:00 )    Color: x / Appearance: x / SG: x / pH: x  Gluc: 121 mg/dL / Ketone: x  / Bili: x / Urobili: x   Blood: x / Protein: x / Nitrite: x   Leuk Esterase: x / RBC: x / WBC x   Sq Epi: x / Non Sq Epi: x / Bacteria: x      Culture - Urine (collected 30 Jul 2024 02:20)  Source: Clean Catch Clean Catch (Midstream)  Final Report (31 Jul 2024 07:17):    <10,000 CFU/mL Normal Urogenital Lucinda      RADIOLOGY:    < from: Xray Chest 1 View- PORTABLE-Urgent (Xray Chest 1 View- PORTABLE-Urgent .) (07.30.24 @ 00:15) >    Impression:    Masslike processin the left retrocardiac region measuring 2.3 cm    Neoplasm/malignancy is not excluded    Postcontrast chest CT is recommended for further evaluation.    --- End of Report ---    ***Please see the addendum at the top of this report. It may contain   additional important information or changes.****    < end of copied text >    < from: CT Abdomen and Pelvis w/ Oral Cont (07.30.24 @ 02:36) >    IMPRESSION:  Mildly nonspecific distended distal small bowel loop in the pelvis   without definite evidence of a small bowel obstruction. The oral contrast   has not yet transverse to the colon. Short-term follow-up radiograph in a   2 hours can be obtained to confirm appropriate transition.        --- End of Report ---    < end of copied text >    < from: Xray Kidney Ureter Bladder (07.30.24 @ 05:01) >    Findings/  impression:    No evidence of small bowel obstruction or pneumoperitoneum. Several   prominent small bowel loops project over the lower abdomen. These measure   up to 2.7 cm. Oral contrast material is noted within. Fecal material is   noted in the colon.    --- End of Report ---    < end of copied text >    < from: Xray Abdomen Minimum 3 Views (07.30.24 @ 12:27) >    Findings/  impression:    There may be a small amount of oral contrast within the left colon.    Again seen are prominent small bowel loops overlying the lower abdomen   measuring up to 3.4 cm.    Large colonic stool load.    Previously noted retrocardiac masslike process within the chest is not   well-seen on this examination. Findings may berelated to differences in   technique.    However, chest CT is recommended based on the prior findings.    Stable cardiomediastinal silhouette.    Stable osseous structures.    --- End of Report ---    < end of copied text >    '< from: Xray Chest 1 View AP/PA (07.30.24 @ 15:47) >  Impression:    Previously noted masslike process in the left retrocardiac area,   paraspinal location, is again seen.    Further evaluation with chest CT is recommended.    --- End of Report ---    < end of copied text >    < from: Xray Abdomen Minimum 3 Views (07.31.24 @ 15:36) >  Findings/  impression:    Distended loops of small bowel with contrast in the differential   air-fluid levels recognize consistent with at least partial small bowel   obstruction.    Enteric catheter extends below the hemidiaphragm.    There is no organomegaly.    Small bilateral effusions.    --- End of Report ---        < end of copied text >  < from: Xray Kidney Ureter Bladder (07.31.24 @ 17:10) >  Findings/  impression: Enteric tube is in place. Persistent small bowel dilation up   to 4.5 cm. Stool seen in the colon. No free air.    --- End of Report ---    < end of copied text >  < from: Xray Kidney Ureter Bladder (07.31.24 @ 23:47) >  Findings/  impression: Enteric tube is stable in the stomach. Stable to minimal   decrease in caliber of the dilated small bowel. Stable stool in the   colon. No significant gas in the rectum.    --- End of Report ---    < end of copied text >    < from: CT Abdomen and Pelvis w/ Oral Cont (08.01.24 @ 14:29) >  IMPRESSION:    New dilated loops of small bowel with a transition point in the right   lower abdomen. Additional loops of dilated bowel in the pelvis with no   intraluminal contrast, may represent an underlying closed loop   obstruction.    Ascending colonic wall thickening may reflect colitis.    New small bilateral pleural effusions. New moderate abdominal pelvic   ascites.    Small pericardial effusion.      --- End of Report ---    ***Please see the addendum at the top of this report. It may contain   additional important information or changes.****    < end of copied text >        PHYSICAL EXAM:  PHYSICAL EXAM:  General: NAD, calm and cooperative  HEENT: NG tube in place, NCAT, EOMI, Trachea ML, Neck supple  Cardiac: RRR  Respiratory: No accessory muscle use, b/l chest rise and fall, normal respiratory effort  Abdomen: Distended, mild diffuse TTP, hypoactive BS x4 quadrants   Musculoskeletal: ROM intact, compartments soft  Vascular: Extremities well perfused  Skin: Warm/dry, normal color, no jaundice      ASSESSMENT AND PLAN:

## 2024-08-02 NOTE — PRE-ANESTHESIA EVALUATION ADULT - NSANTHADDINFOFT_GEN_ALL_CORE
R/B/A discussed with patient.  Patient understands and agrees to plan.  All questions answered.  Consent obtained for GETA and witnessed.

## 2024-08-03 LAB
ANION GAP SERPL CALC-SCNC: 12 MMOL/L — SIGNIFICANT CHANGE UP (ref 7–14)
ANION GAP SERPL CALC-SCNC: 13 MMOL/L — SIGNIFICANT CHANGE UP (ref 7–14)
BASOPHILS # BLD AUTO: 0.01 K/UL — SIGNIFICANT CHANGE UP (ref 0–0.2)
BASOPHILS NFR BLD AUTO: 0.1 % — SIGNIFICANT CHANGE UP (ref 0–1)
BUN SERPL-MCNC: 20 MG/DL — SIGNIFICANT CHANGE UP (ref 10–20)
BUN SERPL-MCNC: 23 MG/DL — HIGH (ref 10–20)
CALCIUM SERPL-MCNC: 8.1 MG/DL — LOW (ref 8.4–10.5)
CALCIUM SERPL-MCNC: 8.3 MG/DL — LOW (ref 8.4–10.4)
CHLORIDE SERPL-SCNC: 101 MMOL/L — SIGNIFICANT CHANGE UP (ref 98–110)
CHLORIDE SERPL-SCNC: 98 MMOL/L — SIGNIFICANT CHANGE UP (ref 98–110)
CO2 SERPL-SCNC: 23 MMOL/L — SIGNIFICANT CHANGE UP (ref 17–32)
CO2 SERPL-SCNC: 25 MMOL/L — SIGNIFICANT CHANGE UP (ref 17–32)
CREAT SERPL-MCNC: 0.6 MG/DL — LOW (ref 0.7–1.5)
CREAT SERPL-MCNC: 0.7 MG/DL — SIGNIFICANT CHANGE UP (ref 0.7–1.5)
EGFR: 91 ML/MIN/1.73M2 — SIGNIFICANT CHANGE UP
EGFR: 94 ML/MIN/1.73M2 — SIGNIFICANT CHANGE UP
EOSINOPHIL # BLD AUTO: 0 K/UL — SIGNIFICANT CHANGE UP (ref 0–0.7)
EOSINOPHIL NFR BLD AUTO: 0 % — SIGNIFICANT CHANGE UP (ref 0–8)
GLUCOSE SERPL-MCNC: 135 MG/DL — HIGH (ref 70–99)
GLUCOSE SERPL-MCNC: 97 MG/DL — SIGNIFICANT CHANGE UP (ref 70–99)
HCT VFR BLD CALC: 32.8 % — LOW (ref 37–47)
HCT VFR BLD CALC: 35.9 % — LOW (ref 37–47)
HGB BLD-MCNC: 10.8 G/DL — LOW (ref 12–16)
HGB BLD-MCNC: 11.8 G/DL — LOW (ref 12–16)
IMM GRANULOCYTES NFR BLD AUTO: 0.1 % — SIGNIFICANT CHANGE UP (ref 0.1–0.3)
LYMPHOCYTES # BLD AUTO: 0.2 K/UL — LOW (ref 1.2–3.4)
LYMPHOCYTES # BLD AUTO: 2.9 % — LOW (ref 20.5–51.1)
MAGNESIUM SERPL-MCNC: 1.9 MG/DL — SIGNIFICANT CHANGE UP (ref 1.8–2.4)
MAGNESIUM SERPL-MCNC: 2 MG/DL — SIGNIFICANT CHANGE UP (ref 1.8–2.4)
MCHC RBC-ENTMCNC: 26.5 PG — LOW (ref 27–31)
MCHC RBC-ENTMCNC: 26.6 PG — LOW (ref 27–31)
MCHC RBC-ENTMCNC: 32.9 G/DL — SIGNIFICANT CHANGE UP (ref 32–37)
MCHC RBC-ENTMCNC: 32.9 G/DL — SIGNIFICANT CHANGE UP (ref 32–37)
MCV RBC AUTO: 80.4 FL — LOW (ref 81–99)
MCV RBC AUTO: 81 FL — SIGNIFICANT CHANGE UP (ref 81–99)
MONOCYTES # BLD AUTO: 0.6 K/UL — SIGNIFICANT CHANGE UP (ref 0.1–0.6)
MONOCYTES NFR BLD AUTO: 8.7 % — SIGNIFICANT CHANGE UP (ref 1.7–9.3)
NEUTROPHILS # BLD AUTO: 6.05 K/UL — SIGNIFICANT CHANGE UP (ref 1.4–6.5)
NEUTROPHILS NFR BLD AUTO: 88.2 % — HIGH (ref 42.2–75.2)
NRBC # BLD: 0 /100 WBCS — SIGNIFICANT CHANGE UP (ref 0–0)
NRBC # BLD: 0 /100 WBCS — SIGNIFICANT CHANGE UP (ref 0–0)
PHOSPHATE SERPL-MCNC: 2.4 MG/DL — SIGNIFICANT CHANGE UP (ref 2.1–4.9)
PHOSPHATE SERPL-MCNC: 3 MG/DL — SIGNIFICANT CHANGE UP (ref 2.1–4.9)
PLATELET # BLD AUTO: 137 K/UL — SIGNIFICANT CHANGE UP (ref 130–400)
PLATELET # BLD AUTO: 146 K/UL — SIGNIFICANT CHANGE UP (ref 130–400)
PMV BLD: 11.6 FL — HIGH (ref 7.4–10.4)
PMV BLD: 12.1 FL — HIGH (ref 7.4–10.4)
POTASSIUM SERPL-MCNC: 3.3 MMOL/L — LOW (ref 3.5–5)
POTASSIUM SERPL-MCNC: 4 MMOL/L — SIGNIFICANT CHANGE UP (ref 3.5–5)
POTASSIUM SERPL-SCNC: 3.3 MMOL/L — LOW (ref 3.5–5)
POTASSIUM SERPL-SCNC: 4 MMOL/L — SIGNIFICANT CHANGE UP (ref 3.5–5)
RBC # BLD: 4.08 M/UL — LOW (ref 4.2–5.4)
RBC # BLD: 4.43 M/UL — SIGNIFICANT CHANGE UP (ref 4.2–5.4)
RBC # FLD: 14.3 % — SIGNIFICANT CHANGE UP (ref 11.5–14.5)
RBC # FLD: 14.6 % — HIGH (ref 11.5–14.5)
SODIUM SERPL-SCNC: 136 MMOL/L — SIGNIFICANT CHANGE UP (ref 135–146)
SODIUM SERPL-SCNC: 136 MMOL/L — SIGNIFICANT CHANGE UP (ref 135–146)
WBC # BLD: 6.56 K/UL — SIGNIFICANT CHANGE UP (ref 4.8–10.8)
WBC # BLD: 6.87 K/UL — SIGNIFICANT CHANGE UP (ref 4.8–10.8)
WBC # FLD AUTO: 6.56 K/UL — SIGNIFICANT CHANGE UP (ref 4.8–10.8)
WBC # FLD AUTO: 6.87 K/UL — SIGNIFICANT CHANGE UP (ref 4.8–10.8)

## 2024-08-03 PROCEDURE — 99232 SBSQ HOSP IP/OBS MODERATE 35: CPT

## 2024-08-03 PROCEDURE — 99024 POSTOP FOLLOW-UP VISIT: CPT

## 2024-08-03 RX ORDER — POTASSIUM CHLORIDE 1500 MG/1
20 TABLET, EXTENDED RELEASE ORAL ONCE
Refills: 0 | Status: COMPLETED | OUTPATIENT
Start: 2024-08-03 | End: 2024-08-04

## 2024-08-03 RX ORDER — POTASSIUM CHLORIDE 1500 MG/1
20 TABLET, EXTENDED RELEASE ORAL ONCE
Refills: 0 | Status: COMPLETED | OUTPATIENT
Start: 2024-08-03 | End: 2024-08-03

## 2024-08-03 RX ORDER — ACETAMINOPHEN 500 MG
725 TABLET ORAL ONCE
Refills: 0 | Status: COMPLETED | OUTPATIENT
Start: 2024-08-03 | End: 2024-08-03

## 2024-08-03 RX ORDER — DEXTROSE MONOHYDRATE, SODIUM CHLORIDE, SODIUM LACTATE, CALCIUM CHLORIDE, MAGNESIUM CHLORIDE 1.5; 538; 448; 18.4; 5.08 G/100ML; MG/100ML; MG/100ML; MG/100ML; MG/100ML
1000 SOLUTION INTRAPERITONEAL
Refills: 0 | Status: DISCONTINUED | OUTPATIENT
Start: 2024-08-03 | End: 2024-08-04

## 2024-08-03 RX ADMIN — DEXTROSE MONOHYDRATE, SODIUM CHLORIDE, SODIUM LACTATE, CALCIUM CHLORIDE, MAGNESIUM CHLORIDE 75 MILLILITER(S): 1.5; 538; 448; 18.4; 5.08 SOLUTION INTRAPERITONEAL at 11:14

## 2024-08-03 RX ADMIN — Medication 10 MILLIGRAM(S): at 22:48

## 2024-08-03 RX ADMIN — Medication 725 MILLIGRAM(S): at 14:44

## 2024-08-03 RX ADMIN — Medication 10 MILLIGRAM(S): at 05:19

## 2024-08-03 RX ADMIN — ENOXAPARIN SODIUM 40 MILLIGRAM(S): 120 INJECTION SUBCUTANEOUS at 22:48

## 2024-08-03 RX ADMIN — Medication 290 MILLIGRAM(S): at 14:14

## 2024-08-03 RX ADMIN — PANTOPRAZOLE SODIUM 40 MILLIGRAM(S): 20 TABLET, DELAYED RELEASE ORAL at 11:14

## 2024-08-03 RX ADMIN — POTASSIUM CHLORIDE 100 MILLIEQUIVALENT(S): 1500 TABLET, EXTENDED RELEASE ORAL at 05:15

## 2024-08-03 NOTE — CHART NOTE - NSCHARTNOTEFT_GEN_A_CORE
Vascular Surgery Post-Op Note, PCN:     Pre-Op Dx: Abdominal pain    Small bowel obstruction    Abdominal pain    Intractable abdominal pain      Procedure: Exploratory laparotomy with small bowel resection    Open lysis of intestinal adhesions      Surgeon: Dr. Powell    Subjective:     Vital Signs Last 24 Hrs  T(C): 36.6 (03 Aug 2024 00:20), Max: 37.1 (02 Aug 2024 19:10)  T(F): 97.8 (03 Aug 2024 00:20), Max: 98.8 (02 Aug 2024 19:10)  HR: 78 (03 Aug 2024 00:20) (68 - 95)  BP: 114/67 (03 Aug 2024 00:20) (114/67 - 155/70)  BP(mean): --  RR: 18 (03 Aug 2024 00:20) (18 - 18)  SpO2: 96% (03 Aug 2024 00:20) (96% - 100%)    Parameters below as of 02 Aug 2024 22:15  Patient On (Oxygen Delivery Method): nasal cannula  O2 Flow (L/min): 2      Physical Exam:  General: NAD, resting comfortably in bed  Pulmonary: b/l equal chest rise, no respiratory distress  Abdominal: soft, midline incision wound vac noted. No surrounding erythema, discharge noted. NGT tube intact.  : Franklin intact  Extremities: WWP, normal strength  Neuro: A/O x 3, CNs II-XII grossly intact, normal motor/sensation, no focal deficits      LABS:                        11.8   6.87  )-----------( 146      ( 03 Aug 2024 00:46 )             35.9     08-03    136  |  101  |  23<H>  ----------------------------<  135<H>  4.0   |  23  |  0.7    Ca    8.1<L>      03 Aug 2024 00:46  Phos  3.0     08-03  Mg     1.9     08-03      PT/INR - ( 01 Aug 2024 22:32 )   PT: 13.70 sec;   INR: 1.20 ratio         PTT - ( 01 Aug 2024 22:32 )  PTT:27.2 sec  CAPILLARY BLOOD GLUCOSE          Urinalysis Basic - ( 03 Aug 2024 00:46 )    Color: x / Appearance: x / SG: x / pH: x  Gluc: 135 mg/dL / Ketone: x  / Bili: x / Urobili: x   Blood: x / Protein: x / Nitrite: x   Leuk Esterase: x / RBC: x / WBC x   Sq Epi: x / Non Sq Epi: x / Bacteria: x        Radiology and Additional Studies:      A/P:  RHONDA WOODRUFF is a 74F w/ pmhx of appendectomy, GERD, osteoporosis, left elbow surgery, recent right foot surgery for hammer toe who presents complaining of abdominal pain, nausea, vomiting. S/p 4 hrs after ex lap with lysis of adhesions, resection of the strictured area. Pt is not complaining of pain. Has franklin intact. Abdomen has a wound vac of the abdominal midline incision. NGT intact.    PLAN:   -NPO with IVF until bowel function  keep NGT and franklin overnight  Dulcolax suppository BID  D/C franklin in AM  24hr abx Surgery Post-Op Note    Pre-Op Dx: Abdominal pain    Small bowel obstruction    Abdominal pain    Intractable abdominal pain      Procedure: Exploratory laparotomy with small bowel resection    Open lysis of intestinal adhesions      Surgeon: Dr. Powell    Subjective:     Vital Signs Last 24 Hrs  T(C): 36.6 (03 Aug 2024 00:20), Max: 37.1 (02 Aug 2024 19:10)  T(F): 97.8 (03 Aug 2024 00:20), Max: 98.8 (02 Aug 2024 19:10)  HR: 78 (03 Aug 2024 00:20) (68 - 95)  BP: 114/67 (03 Aug 2024 00:20) (114/67 - 155/70)  BP(mean): --  RR: 18 (03 Aug 2024 00:20) (18 - 18)  SpO2: 96% (03 Aug 2024 00:20) (96% - 100%)    Parameters below as of 02 Aug 2024 22:15  Patient On (Oxygen Delivery Method): nasal cannula  O2 Flow (L/min): 2      Physical Exam:  General: NAD, resting comfortably in bed  Pulmonary: b/l equal chest rise, no respiratory distress  Abdominal: soft, midline incision wound vac noted. No surrounding erythema, discharge noted. NGT tube intact.  : Franklin intact  Extremities: WWP, normal strength  Neuro: A/O x 3, CNs II-XII grossly intact, normal motor/sensation, no focal deficits      LABS:                        11.8   6.87  )-----------( 146      ( 03 Aug 2024 00:46 )             35.9     08-03    136  |  101  |  23<H>  ----------------------------<  135<H>  4.0   |  23  |  0.7    Ca    8.1<L>      03 Aug 2024 00:46  Phos  3.0     08-03  Mg     1.9     08-03      PT/INR - ( 01 Aug 2024 22:32 )   PT: 13.70 sec;   INR: 1.20 ratio         PTT - ( 01 Aug 2024 22:32 )  PTT:27.2 sec  CAPILLARY BLOOD GLUCOSE          Urinalysis Basic - ( 03 Aug 2024 00:46 )    Color: x / Appearance: x / SG: x / pH: x  Gluc: 135 mg/dL / Ketone: x  / Bili: x / Urobili: x   Blood: x / Protein: x / Nitrite: x   Leuk Esterase: x / RBC: x / WBC x   Sq Epi: x / Non Sq Epi: x / Bacteria: x        Radiology and Additional Studies:      A/P:  RHONDA WOODRUFF is a 74F w/ pmhx of appendectomy, GERD, osteoporosis, left elbow surgery, recent right foot surgery for hammer toe who presents complaining of abdominal pain, nausea, vomiting. S/p 4 hrs after ex lap with lysis of adhesions, resection of the strictured area. Pt is not complaining of pain. Has franklin intact. Abdomen has a wound vac of the abdominal midline incision. NGT intact.    PLAN:   -NPO with IVF until bowel function  keep NGT and franklin overnight  Dulcolax suppository BID  D/C franklin in AM  24hr abx

## 2024-08-03 NOTE — PROGRESS NOTE ADULT - SUBJECTIVE AND OBJECTIVE BOX
SUBJECTIVE:    Patient is a 74y old Female who presents with a chief complaint of Abdominal pain, SBO versus diffuse colonic stool burden (01 Aug 2024 02:16)    Currently admitted to medicine with the primary diagnosis of Intractable abdominal pain    Patient seen and examined at bedside. This morning she is resting in bed and reports no new issues or overnight events. States she wishes to ambulate more and has not had a BM/flatus.    ROS:   CONSTITUTIONAL: No weakness, fevers or chills   EYES/ENT: No visual changes; No vertigo or throat pain   NECK: No pain or stiffness   RESPIRATORY: No cough, wheezing, hemoptysis; No shortness of breath   CARDIOVASCULAR: No chest pain or palpitations   GASTROINTESTINAL: No abdominal or epigastric pain. No nausea, vomiting, or hematemesis; No diarrhea or constipation. No melena or hematochezia.  GENITOURINARY: No dysuria, frequency or hematuria  NEUROLOGICAL: No numbness or weakness  SKIN: No itching, rashes      PAST MEDICAL & SURGICAL HISTORY  Osteoporosis    Multiple closed and open fractures    Toe fracture, right    Dry eyes    Retinal tear, left    Elbow fracture, left    S/P appendectomy        SOCIAL HISTORY:  Negative for smoking/alcohol/drug use.     ALLERGIES:  latex (Pruritus)  aspirin (Vomiting)  penicillin (Hives)  contrast media (iodine-based) (Flushing)  sulfa drugs (Pruritus; Rash)  Advil (Anaphylaxis)      MEDICATIONS:    MEDICATIONS  (STANDING):  bisacodyl Suppository 10 milliGRAM(s) Rectal <User Schedule>  enoxaparin Injectable 40 milliGRAM(s) SubCutaneous every 24 hours  lactated ringers. 1000 milliLiter(s) (75 mL/Hr) IV Continuous <Continuous>  pantoprazole  Injectable 40 milliGRAM(s) IV Push daily  potassium chloride  20 mEq/100 mL IVPB 20 milliEquivalent(s) IV Intermittent once    MEDICATIONS  (PRN):  ondansetron Injectable 4 milliGRAM(s) IV Push every 6 hours PRN Nausea      VITALS:     Vital Signs Last 24 Hrs  T(C): 36.6 (03 Aug 2024 16:30), Max: 36.9 (03 Aug 2024 07:27)  T(F): 97.8 (03 Aug 2024 16:30), Max: 98.4 (03 Aug 2024 07:27)  HR: 75 (03 Aug 2024 16:30) (72 - 95)  BP: 106/66 (03 Aug 2024 16:30) (105/63 - 155/70)  BP(mean): --  RR: 18 (03 Aug 2024 16:30) (18 - 18)  SpO2: 96% (03 Aug 2024 16:30) (96% - 99%)    Parameters below as of 03 Aug 2024 15:00  Patient On (Oxygen Delivery Method): room air        LABS:                          11.8   6.87  )-----------( 146      ( 03 Aug 2024 00:46 )             35.9   08-03    136  |  101  |  23<H>  ----------------------------<  135<H>  4.0   |  23  |  0.7    Ca    8.1<L>      03 Aug 2024 00:46  Phos  3.0     08-03  Mg     1.9     08-03           Urinalysis Basic - ( 31 Jul 2024 20:00 )    Color: x / Appearance: x / SG: x / pH: x  Gluc: 121 mg/dL / Ketone: x  / Bili: x / Urobili: x   Blood: x / Protein: x / Nitrite: x   Leuk Esterase: x / RBC: x / WBC x   Sq Epi: x / Non Sq Epi: x / Bacteria: x      Culture - Urine (collected 30 Jul 2024 02:20)  Source: Clean Catch Clean Catch (Midstream)  Final Report (31 Jul 2024 07:17):    <10,000 CFU/mL Normal Urogenital Lucinda      RADIOLOGY:        < from: Xray Chest 1 View- PORTABLE-Urgent (Xray Chest 1 View- PORTABLE-Urgent .) (07.30.24 @ 00:15) >    Impression:    Masslike processin the left retrocardiac region measuring 2.3 cm    Neoplasm/malignancy is not excluded    Postcontrast chest CT is recommended for further evaluation.    --- End of Report ---    ***Please see the addendum at the top of this report. It may contain   additional important information or changes.****    < end of copied text >    < from: CT Abdomen and Pelvis w/ Oral Cont (07.30.24 @ 02:36) >    IMPRESSION:  Mildly nonspecific distended distal small bowel loop in the pelvis   without definite evidence of a small bowel obstruction. The oral contrast   has not yet transverse to the colon. Short-term follow-up radiograph in a   2 hours can be obtained to confirm appropriate transition.        --- End of Report ---    < end of copied text >    < from: Xray Kidney Ureter Bladder (07.30.24 @ 05:01) >    Findings/  impression:    No evidence of small bowel obstruction or pneumoperitoneum. Several   prominent small bowel loops project over the lower abdomen. These measure   up to 2.7 cm. Oral contrast material is noted within. Fecal material is   noted in the colon.    --- End of Report ---    < end of copied text >    < from: Xray Abdomen Minimum 3 Views (07.30.24 @ 12:27) >    Findings/  impression:    There may be a small amount of oral contrast within the left colon.    Again seen are prominent small bowel loops overlying the lower abdomen   measuring up to 3.4 cm.    Large colonic stool load.    Previously noted retrocardiac masslike process within the chest is not   well-seen on this examination. Findings may berelated to differences in   technique.    However, chest CT is recommended based on the prior findings.    Stable cardiomediastinal silhouette.    Stable osseous structures.    --- End of Report ---    < end of copied text >    '< from: Xray Chest 1 View AP/PA (07.30.24 @ 15:47) >  Impression:    Previously noted masslike process in the left retrocardiac area,   paraspinal location, is again seen.    Further evaluation with chest CT is recommended.    --- End of Report ---    < end of copied text >    < from: Xray Abdomen Minimum 3 Views (07.31.24 @ 15:36) >  Findings/  impression:    Distended loops of small bowel with contrast in the differential   air-fluid levels recognize consistent with at least partial small bowel   obstruction.    Enteric catheter extends below the hemidiaphragm.    There is no organomegaly.    Small bilateral effusions.    --- End of Report ---        < end of copied text >  < from: Xray Kidney Ureter Bladder (07.31.24 @ 17:10) >  Findings/  impression: Enteric tube is in place. Persistent small bowel dilation up   to 4.5 cm. Stool seen in the colon. No free air.    --- End of Report ---    < end of copied text >  < from: Xray Kidney Ureter Bladder (07.31.24 @ 23:47) >  Findings/  impression: Enteric tube is stable in the stomach. Stable to minimal   decrease in caliber of the dilated small bowel. Stable stool in the   colon. No significant gas in the rectum.    --- End of Report ---    < end of copied text >    < from: CT Abdomen and Pelvis w/ Oral Cont (08.01.24 @ 14:29) >  IMPRESSION:    New dilated loops of small bowel with a transition point in the right   lower abdomen. Additional loops of dilated bowel in the pelvis with no   intraluminal contrast, may represent an underlying closed loop   obstruction.    Ascending colonic wall thickening may reflect colitis.    New small bilateral pleural effusions. New moderate abdominal pelvic   ascites.    Small pericardial effusion.      --- End of Report ---    ***Please see the addendum at the top of this report. It may contain   additional important information or changes.****    < end of copied text >      < from: CT Abdomen and Pelvis w/ Oral Cont (08.01.24 @ 14:29) >  NTERPRETATION:  Clinical Indication: Concern for small bowel obstruction.    Technique: Multidetector-row CT images of the abdomen and pelvis were   obtained from the xiphoid through the symphysis pubis. No contrast was   administered. Coronal and sagittal reconstructions were performed.    Comparison: CT abdomen and pelvis 7/30/2024    Findings:    01. LIVER: Normal unenhanced.  02. SPLEEN: Normal unenhanced.  03. PANCREAS: Normal unenhanced.  04.GALLBLADDER/BILIARY TREE: No biliary duct dilatation.  Normal   gallbladder.  05. ADRENALS: Normal.  06. KIDNEYS: Symmetric in size.  No hydronephrosis or renal calculi.  07. LYMPHADENOPATHY/RETROPERITONEUM: No lymphadenopathy.  08. BOWEL: Enteric tube terminating within the stomach New dilated loops   of small bowel measuring up to 3.8 cm with a transition point in the   right lower abdomen (series 602 image 45). Additional loops of dilated   bowel in the pelvis measuring 3.2 cm (series 5 image 81) with no   intraluminal contrast. Underlying closed loop obstruction is not   excluded. Ascending colonic wall thickening may reflect colitis.   Questionable small hiatal hernia.  09. PELVIC VISCERA: Uterine fundal calcification versus pelvic phlebolith   may represent a small fibroid. Underdistended urinary bladder.  10. PELVIC LYMPH NODES: No lymphadenopathy.  11. VASCULATURE: No abdominal aortic aneurysm.  12. PERITONEUM/ABDOMINAL WALL: Moderate abdominopelvic ascites.  13. SKELETAL: Degenerative changes.  14. LUNG BASES: New small bilateral pleural effusions. Areas of small   atelectasis. Small pericardial effusion.    IMPRESSION:    New dilated loops of small bowel with a transition point in the right   lower abdomen. Additional loops of dilated bowel in the pelvis with no   intraluminal contrast, may represent an underlying closed loop   obstruction.    Ascending colonic wall thickening may reflect colitis.    New small bilateral pleural effusions. New moderate abdominal pelvic   ascites.    Small pericardial effusion.      --- End of Report ---    < end of copied text >        PHYSICAL EXAM:  PHYSICAL EXAM:  General: NAD, calm and cooperative  HEENT: NG tube in place, NCAT, EOMI, Trachea ML, Neck supple  Cardiac: RRR  Respiratory: No accessory muscle use, b/l chest rise and fall, normal respiratory effort  Abdomen: Distended, mild diffuse TTP, hypoactive BS x4 quadrants   Musculoskeletal: ROM intact, compartments soft  Vascular: Extremities well perfused  Skin: Warm/dry, normal color, no jaundice      ASSESSMENT AND PLAN:

## 2024-08-03 NOTE — PROGRESS NOTE ADULT - ASSESSMENT
74F w/ pmhx of appendectomy, GERD, osteoporosis, left elbow surgery, recent right foot surgery for hammer toe who presents complaining of abdominal pain, nausea, vomiting. Pt not passing BM's or flatus for several days. Imaging consistent with partial SBO. Pt is NPO with NGT to LIS.       Problem list:    #Abdominal pain/Nausea/Vomiting 2/2 partial SBO  #Small B/L pleural effusions (new)  #Abdominal wall thickening possibly 2/2 Colitis   #Moderate abdominal pelvic ascites (new)  #Hx of GERD   #Hx of osteoporosis   #Borderline hypokalemia         Plan:  - serial abdominal exams   - stool count and monitor for flatus  - indication for surgical intervention per primary team (plan for ex-lap +/- resection/ostomy)  - NPO with NGT to LIS; monitor output   - Elevate HOB   - aspiration precautions   - daily KUB in the interim   - PRN pain scale   - monitor for fever, trend WBC, and other signs of infection; consider empiric ABx if indicated   - c/w Protonix 40mg IV qd  - would consider holding IVF/dec rate given new effusions and monitoring   - consider CXR in the AM to reassess pleural effusions   - incentive spirometer (pt endorsing intermittent dyspnea)  - monitor electrolytes daily, consider KCl IV 20meq x1   - PT / ambulate daily      Plan of care discussed with patient and primary team.     Total time spent to complete patient's bedside assessment, reviewed medical chart, discussed medical plan of care with team was more than 35 minutes with >50% of time spent face to face with patient, discussion with patient/family and/or coordination of care 74F w/ pmhx of appendectomy, GERD, osteoporosis, left elbow surgery, recent right foot surgery for hammer toe who presents complaining of abdominal pain, nausea, vomiting. Pt not passing BM's or flatus for several days. Imaging consistent with partial SBO. Pt is NPO with NGT to LIS.       Problem list:    #Abdominal pain/Nausea/Vomiting 2/2 partial SBO  #Small B/L pleural effusions  #Abdominal wall thickening possibly 2/2 Colitis   #Moderate abdominal pelvic ascites   #Hx of GERD   #Hx of osteoporosis   #Borderline hypokalemia (improved)        Plan:  s/p ex-lap + resection/EUNICE  - serial abdominal exams   - stool count and monitor for flatus  - NPO with NGT to LIS; monitor output   - Elevate HOB   - aspiration precautions   - daily KUB in the interim  - PRN pain scale   - monitor for fever, trend WBC, and other signs of infection; consider empiric ABx if indicated   - c/w Protonix 40mg IV qd  - would consider holding IVF/dec rate given effusions and monitoring   - consider CXR in the AM to reassess pleural effusions   - incentive spirometer (pt endorsing intermittent dyspnea)  - monitor electrolytes daily, consider KCl IV 20meq x1   - PT / ambulate daily  - Suppository BID      Plan of care discussed with patient and primary team.     Total time spent to complete patient's bedside assessment, reviewed medical chart, discussed medical plan of care with team was more than 35 minutes with >50% of time spent face to face with patient, discussion with patient/family and/or coordination of care

## 2024-08-03 NOTE — PROGRESS NOTE ADULT - ASSESSMENT
A/P:  RHONDA WOODRUFF is a 74F w/ pmhx of appendectomy, GERD, osteoporosis, left elbow surgery, recent right foot surgery for hammer toe who presents complaining of abdominal pain, nausea, vomiting. S/p 4 hrs after ex lap with lysis of adhesions, resection of the strictured area. Pt is not complaining of pain. Has franklin intact. Abdomen has a wound vac of the abdominal midline incision. NGT intact.    PLAN:   -NPO with IVF until bowel function  keep NGT and franklin overnight  Dulcolax suppository BID  D/C franklin in AM  24hr abx.

## 2024-08-03 NOTE — PROGRESS NOTE ADULT - SUBJECTIVE AND OBJECTIVE BOX
GENERAL SURGERY PROGRESS NOTE     RHONDA WOODRUFF  [address] [place] [address] day :4d    POD: 0  Procedure: Exploratory laparotomy with small bowel resection    Open lysis of intestinal adhesions      Surgical Attending: Dr. Powell  24hr events: Pt s/p ex lap with lysis of adhesions, open resection of the strictured area. Pt has mild abdominal pain. Abdomen is soft. Post-op Hgb is 11.8. aleukocytosis. NGT put out 300cc of brown liquid.      Physical Exam:  General: NAD, resting comfortably in bed  Pulmonary: b/l equal chest rise, no respiratory distress  Abdominal: soft, midline incision wound vac noted. No surrounding erythema, discharge noted. NGT tube intact.  : Dawn intact  Extremities: WWP, normal strength  Neuro: A/O x 3, CNs II-XII grossly intact, normal motor/sensation, no focal deficits        T(F): 97.8 (08-03-24 @ 00:20), Max: 98.8 (08-02-24 @ 19:10)  HR: 78 (08-03-24 @ 00:20) (68 - 95)  BP: 114/67 (08-03-24 @ 00:20) (114/67 - 155/70)  ABP: --  ABP(mean): --  RR: 18 (08-03-24 @ 00:20) (18 - 18)  SpO2: 96% (08-03-24 @ 00:20) (96% - 100%)    IN'S / OUT's:    08-01-24 @ 07:01  -  08-02-24 @ 07:00  --------------------------------------------------------  IN:    sodium chloride 0.9%: 900 mL  Total IN: 900 mL    OUT:    Nasogastric/Oral tube (mL): 150 mL  Total OUT: 150 mL    Total NET: 750 mL          MEDICATIONS  (STANDING):  bisacodyl Suppository 10 milliGRAM(s) Rectal <User Schedule>  enoxaparin Injectable 40 milliGRAM(s) SubCutaneous every 24 hours  pantoprazole  Injectable 40 milliGRAM(s) IV Push daily  potassium chloride  20 mEq/100 mL IVPB 20 milliEquivalent(s) IV Intermittent once  potassium chloride  20 mEq/100 mL IVPB 20 milliEquivalent(s) IV Intermittent once    MEDICATIONS  (PRN):  ondansetron Injectable 4 milliGRAM(s) IV Push every 6 hours PRN Nausea      LABS  Labs:  CAPILLARY BLOOD GLUCOSE                              11.8   6.87  )-----------( 146      ( 03 Aug 2024 00:46 )             35.9       Auto Neutrophil %: 88.2 % (08-03-24 @ 00:46)  Auto Immature Granulocyte %: 0.1 % (08-03-24 @ 00:46)    08-03    136  |  101  |  23<H>  ----------------------------<  135<H>  4.0   |  23  |  0.7      Calcium: 8.1 mg/dL (08-03-24 @ 00:46)      LFTs:         Coags:     13.70  ----< 1.20    ( 01 Aug 2024 22:32 )     27.2                Urinalysis Basic - ( 03 Aug 2024 00:46 )    Color: x / Appearance: x / SG: x / pH: x  Gluc: 135 mg/dL / Ketone: x  / Bili: x / Urobili: x   Blood: x / Protein: x / Nitrite: x   Leuk Esterase: x / RBC: x / WBC x   Sq Epi: x / Non Sq Epi: x / Bacteria: x            RADIOLOGY & ADDITIONAL TESTS:

## 2024-08-04 LAB
ANION GAP SERPL CALC-SCNC: 11 MMOL/L — SIGNIFICANT CHANGE UP (ref 7–14)
BUN SERPL-MCNC: 19 MG/DL — SIGNIFICANT CHANGE UP (ref 10–20)
CALCIUM SERPL-MCNC: 8.6 MG/DL — SIGNIFICANT CHANGE UP (ref 8.4–10.4)
CHLORIDE SERPL-SCNC: 98 MMOL/L — SIGNIFICANT CHANGE UP (ref 98–110)
CO2 SERPL-SCNC: 29 MMOL/L — SIGNIFICANT CHANGE UP (ref 17–32)
CREAT SERPL-MCNC: 0.6 MG/DL — LOW (ref 0.7–1.5)
EGFR: 94 ML/MIN/1.73M2 — SIGNIFICANT CHANGE UP
GLUCOSE SERPL-MCNC: 106 MG/DL — HIGH (ref 70–99)
MAGNESIUM SERPL-MCNC: 1.9 MG/DL — SIGNIFICANT CHANGE UP (ref 1.8–2.4)
PHOSPHATE SERPL-MCNC: 1.9 MG/DL — LOW (ref 2.1–4.9)
POTASSIUM SERPL-MCNC: 4 MMOL/L — SIGNIFICANT CHANGE UP (ref 3.5–5)
POTASSIUM SERPL-SCNC: 4 MMOL/L — SIGNIFICANT CHANGE UP (ref 3.5–5)
SODIUM SERPL-SCNC: 138 MMOL/L — SIGNIFICANT CHANGE UP (ref 135–146)

## 2024-08-04 PROCEDURE — 74018 RADEX ABDOMEN 1 VIEW: CPT | Mod: 26

## 2024-08-04 PROCEDURE — 99232 SBSQ HOSP IP/OBS MODERATE 35: CPT

## 2024-08-04 RX ORDER — ACETAMINOPHEN 500 MG
725 TABLET ORAL ONCE
Refills: 0 | Status: COMPLETED | OUTPATIENT
Start: 2024-08-04 | End: 2024-08-04

## 2024-08-04 RX ORDER — DEXTROSE MONOHYDRATE, SODIUM CHLORIDE, SODIUM LACTATE, CALCIUM CHLORIDE, MAGNESIUM CHLORIDE 1.5; 538; 448; 18.4; 5.08 G/100ML; MG/100ML; MG/100ML; MG/100ML; MG/100ML
1000 SOLUTION INTRAPERITONEAL
Refills: 0 | Status: DISCONTINUED | OUTPATIENT
Start: 2024-08-04 | End: 2024-08-06

## 2024-08-04 RX ADMIN — Medication 10 MILLIGRAM(S): at 22:39

## 2024-08-04 RX ADMIN — DEXTROSE MONOHYDRATE, SODIUM CHLORIDE, SODIUM LACTATE, CALCIUM CHLORIDE, MAGNESIUM CHLORIDE 75 MILLILITER(S): 1.5; 538; 448; 18.4; 5.08 SOLUTION INTRAPERITONEAL at 14:32

## 2024-08-04 RX ADMIN — Medication 10 MILLIGRAM(S): at 05:50

## 2024-08-04 RX ADMIN — Medication 290 MILLIGRAM(S): at 05:50

## 2024-08-04 RX ADMIN — DEXTROSE MONOHYDRATE, SODIUM CHLORIDE, SODIUM LACTATE, CALCIUM CHLORIDE, MAGNESIUM CHLORIDE 75 MILLILITER(S): 1.5; 538; 448; 18.4; 5.08 SOLUTION INTRAPERITONEAL at 05:59

## 2024-08-04 RX ADMIN — Medication 725 MILLIGRAM(S): at 07:11

## 2024-08-04 RX ADMIN — PANTOPRAZOLE SODIUM 40 MILLIGRAM(S): 20 TABLET, DELAYED RELEASE ORAL at 11:51

## 2024-08-04 RX ADMIN — ENOXAPARIN SODIUM 40 MILLIGRAM(S): 120 INJECTION SUBCUTANEOUS at 22:47

## 2024-08-04 NOTE — PROGRESS NOTE ADULT - SUBJECTIVE AND OBJECTIVE BOX
SUBJECTIVE:    Patient is a 74y old Female who presents with a chief complaint of Abdominal pain, SBO versus diffuse colonic stool burden (01 Aug 2024 02:16)    Currently admitted to medicine with the primary diagnosis of Intractable abdominal pain    Patient seen and examined at bedside.   This morning she is resting in bed and reports no new issues or overnight events.   States she wishes to ambulate more and has not passed flatus.   States she passed 2 small piece of hard stool with AM suppository.     ROS:   CONSTITUTIONAL: No weakness, fevers or chills   EYES/ENT: No visual changes; No vertigo or throat pain   NECK: No pain or stiffness   RESPIRATORY: No cough, wheezing, hemoptysis; No shortness of breath   CARDIOVASCULAR: No chest pain or palpitations   GASTROINTESTINAL: No abdominal or epigastric pain. No nausea, vomiting, or hematemesis; No diarrhea or constipation. No melena or hematochezia.  GENITOURINARY: No dysuria, frequency or hematuria  NEUROLOGICAL: No numbness or weakness  SKIN: No itching, rashes      PAST MEDICAL & SURGICAL HISTORY  Osteoporosis    Multiple closed and open fractures    Toe fracture, right    Dry eyes    Retinal tear, left    Elbow fracture, left    S/P appendectomy        SOCIAL HISTORY:  Negative for smoking/alcohol/drug use.     ALLERGIES:  latex (Pruritus)  aspirin (Vomiting)  penicillin (Hives)  contrast media (iodine-based) (Flushing)  sulfa drugs (Pruritus; Rash)  Advil (Anaphylaxis)      MEDICATIONS:    MEDICATIONS  (STANDING):  bisacodyl Suppository 10 milliGRAM(s) Rectal <User Schedule>  enoxaparin Injectable 40 milliGRAM(s) SubCutaneous every 24 hours  lactated ringers. 1000 milliLiter(s) (75 mL/Hr) IV Continuous <Continuous>  pantoprazole  Injectable 40 milliGRAM(s) IV Push daily  potassium chloride  20 mEq/100 mL IVPB 20 milliEquivalent(s) IV Intermittent once    MEDICATIONS  (PRN):  ondansetron Injectable 4 milliGRAM(s) IV Push every 6 hours PRN Nausea      VITALS:     Vital Signs Last 24 Hrs  T(C): 36.6 (03 Aug 2024 16:30), Max: 36.9 (03 Aug 2024 07:27)  T(F): 97.8 (03 Aug 2024 16:30), Max: 98.4 (03 Aug 2024 07:27)  HR: 75 (03 Aug 2024 16:30) (72 - 95)  BP: 106/66 (03 Aug 2024 16:30) (105/63 - 155/70)  BP(mean): --  RR: 18 (03 Aug 2024 16:30) (18 - 18)  SpO2: 96% (03 Aug 2024 16:30) (96% - 99%)    Parameters below as of 03 Aug 2024 15:00  Patient On (Oxygen Delivery Method): room air        LABS:                          11.8   6.87  )-----------( 146      ( 03 Aug 2024 00:46 )             35.9   08-03    136  |  101  |  23<H>  ----------------------------<  135<H>  4.0   |  23  |  0.7    Ca    8.1<L>      03 Aug 2024 00:46  Phos  3.0     08-03  Mg     1.9     08-03           Urinalysis Basic - ( 31 Jul 2024 20:00 )    Color: x / Appearance: x / SG: x / pH: x  Gluc: 121 mg/dL / Ketone: x  / Bili: x / Urobili: x   Blood: x / Protein: x / Nitrite: x   Leuk Esterase: x / RBC: x / WBC x   Sq Epi: x / Non Sq Epi: x / Bacteria: x      Culture - Urine (collected 30 Jul 2024 02:20)  Source: Clean Catch Clean Catch (Midstream)  Final Report (31 Jul 2024 07:17):    <10,000 CFU/mL Normal Urogenital Lucinda      RADIOLOGY:        < from: Xray Chest 1 View- PORTABLE-Urgent (Xray Chest 1 View- PORTABLE-Urgent .) (07.30.24 @ 00:15) >    Impression:    Masslike processin the left retrocardiac region measuring 2.3 cm    Neoplasm/malignancy is not excluded    Postcontrast chest CT is recommended for further evaluation.    --- End of Report ---    ***Please see the addendum at the top of this report. It may contain   additional important information or changes.****    < end of copied text >    < from: CT Abdomen and Pelvis w/ Oral Cont (07.30.24 @ 02:36) >    IMPRESSION:  Mildly nonspecific distended distal small bowel loop in the pelvis   without definite evidence of a small bowel obstruction. The oral contrast   has not yet transverse to the colon. Short-term follow-up radiograph in a   2 hours can be obtained to confirm appropriate transition.        --- End of Report ---    < end of copied text >    < from: Xray Kidney Ureter Bladder (07.30.24 @ 05:01) >    Findings/  impression:    No evidence of small bowel obstruction or pneumoperitoneum. Several   prominent small bowel loops project over the lower abdomen. These measure   up to 2.7 cm. Oral contrast material is noted within. Fecal material is   noted in the colon.    --- End of Report ---    < end of copied text >    < from: Xray Abdomen Minimum 3 Views (07.30.24 @ 12:27) >    Findings/  impression:    There may be a small amount of oral contrast within the left colon.    Again seen are prominent small bowel loops overlying the lower abdomen   measuring up to 3.4 cm.    Large colonic stool load.    Previously noted retrocardiac masslike process within the chest is not   well-seen on this examination. Findings may berelated to differences in   technique.    However, chest CT is recommended based on the prior findings.    Stable cardiomediastinal silhouette.    Stable osseous structures.    --- End of Report ---    < end of copied text >    '< from: Xray Chest 1 View AP/PA (07.30.24 @ 15:47) >  Impression:    Previously noted masslike process in the left retrocardiac area,   paraspinal location, is again seen.    Further evaluation with chest CT is recommended.    --- End of Report ---    < end of copied text >    < from: Xray Abdomen Minimum 3 Views (07.31.24 @ 15:36) >  Findings/  impression:    Distended loops of small bowel with contrast in the differential   air-fluid levels recognize consistent with at least partial small bowel   obstruction.    Enteric catheter extends below the hemidiaphragm.    There is no organomegaly.    Small bilateral effusions.    --- End of Report ---        < end of copied text >  < from: Xray Kidney Ureter Bladder (07.31.24 @ 17:10) >  Findings/  impression: Enteric tube is in place. Persistent small bowel dilation up   to 4.5 cm. Stool seen in the colon. No free air.    --- End of Report ---    < end of copied text >  < from: Xray Kidney Ureter Bladder (07.31.24 @ 23:47) >  Findings/  impression: Enteric tube is stable in the stomach. Stable to minimal   decrease in caliber of the dilated small bowel. Stable stool in the   colon. No significant gas in the rectum.    --- End of Report ---    < end of copied text >    < from: CT Abdomen and Pelvis w/ Oral Cont (08.01.24 @ 14:29) >  IMPRESSION:    New dilated loops of small bowel with a transition point in the right   lower abdomen. Additional loops of dilated bowel in the pelvis with no   intraluminal contrast, may represent an underlying closed loop   obstruction.    Ascending colonic wall thickening may reflect colitis.    New small bilateral pleural effusions. New moderate abdominal pelvic   ascites.    Small pericardial effusion.      --- End of Report ---    ***Please see the addendum at the top of this report. It may contain   additional important information or changes.****    < end of copied text >      < from: CT Abdomen and Pelvis w/ Oral Cont (08.01.24 @ 14:29) >  NTERPRETATION:  Clinical Indication: Concern for small bowel obstruction.    Technique: Multidetector-row CT images of the abdomen and pelvis were   obtained from the xiphoid through the symphysis pubis. No contrast was   administered. Coronal and sagittal reconstructions were performed.    Comparison: CT abdomen and pelvis 7/30/2024    Findings:    01. LIVER: Normal unenhanced.  02. SPLEEN: Normal unenhanced.  03. PANCREAS: Normal unenhanced.  04.GALLBLADDER/BILIARY TREE: No biliary duct dilatation.  Normal   gallbladder.  05. ADRENALS: Normal.  06. KIDNEYS: Symmetric in size.  No hydronephrosis or renal calculi.  07. LYMPHADENOPATHY/RETROPERITONEUM: No lymphadenopathy.  08. BOWEL: Enteric tube terminating within the stomach New dilated loops   of small bowel measuring up to 3.8 cm with a transition point in the   right lower abdomen (series 602 image 45). Additional loops of dilated   bowel in the pelvis measuring 3.2 cm (series 5 image 81) with no   intraluminal contrast. Underlying closed loop obstruction is not   excluded. Ascending colonic wall thickening may reflect colitis.   Questionable small hiatal hernia.  09. PELVIC VISCERA: Uterine fundal calcification versus pelvic phlebolith   may represent a small fibroid. Underdistended urinary bladder.  10. PELVIC LYMPH NODES: No lymphadenopathy.  11. VASCULATURE: No abdominal aortic aneurysm.  12. PERITONEUM/ABDOMINAL WALL: Moderate abdominopelvic ascites.  13. SKELETAL: Degenerative changes.  14. LUNG BASES: New small bilateral pleural effusions. Areas of small   atelectasis. Small pericardial effusion.    IMPRESSION:    New dilated loops of small bowel with a transition point in the right   lower abdomen. Additional loops of dilated bowel in the pelvis with no   intraluminal contrast, may represent an underlying closed loop   obstruction.    Ascending colonic wall thickening may reflect colitis.    New small bilateral pleural effusions. New moderate abdominal pelvic   ascites.    Small pericardial effusion.      --- End of Report ---    < end of copied text >        PHYSICAL EXAM:  PHYSICAL EXAM:  General: NAD, calm and cooperative  HEENT: NG tube in place, NCAT, EOMI, Trachea ML, Neck supple  Cardiac: RRR  Respiratory: No accessory muscle use, b/l chest rise and fall, normal respiratory effort  Abdomen: Distended, mild diffuse TTP, hypoactive BS x4 quadrants   Musculoskeletal: ROM intact, compartments soft  Vascular: Extremities well perfused  Skin: Warm/dry, normal color, no jaundice      ASSESSMENT AND PLAN: SUBJECTIVE:    Patient is a 74y old Female who presents with a chief complaint of Abdominal pain, SBO versus diffuse colonic stool burden (01 Aug 2024 02:16)    Currently admitted to medicine with the primary diagnosis of Intractable abdominal pain    Patient seen and examined at bedside.   This morning she is resting in bed and reports no new issues or overnight events.   States she wishes to ambulate more and has not passed flatus. Confirmed with nursing that patient ambulated this AM with the overnight RN and seen ambulating early afternoon some time after evaluation.  States she passed 2 small piece of hard stool with AM suppository.     ROS:   CONSTITUTIONAL: No weakness, fevers or chills   EYES/ENT: No visual changes; No vertigo or throat pain   NECK: No pain or stiffness   RESPIRATORY: No cough, wheezing, hemoptysis; No shortness of breath   CARDIOVASCULAR: No chest pain or palpitations   GASTROINTESTINAL: No abdominal or epigastric pain. No nausea, vomiting, or hematemesis; No diarrhea or constipation. No melena or hematochezia.  GENITOURINARY: No dysuria, frequency or hematuria  NEUROLOGICAL: No numbness or weakness  SKIN: No itching, rashes      PAST MEDICAL & SURGICAL HISTORY  Osteoporosis    Multiple closed and open fractures    Toe fracture, right    Dry eyes    Retinal tear, left    Elbow fracture, left    S/P appendectomy        SOCIAL HISTORY:  Negative for smoking/alcohol/drug use.     ALLERGIES:  latex (Pruritus)  aspirin (Vomiting)  penicillin (Hives)  contrast media (iodine-based) (Flushing)  sulfa drugs (Pruritus; Rash)  Advil (Anaphylaxis)      MEDICATIONS:    MEDICATIONS  (STANDING):  bisacodyl Suppository 10 milliGRAM(s) Rectal <User Schedule>  enoxaparin Injectable 40 milliGRAM(s) SubCutaneous every 24 hours  lactated ringers. 1000 milliLiter(s) (75 mL/Hr) IV Continuous <Continuous>  pantoprazole  Injectable 40 milliGRAM(s) IV Push daily  potassium chloride  20 mEq/100 mL IVPB 20 milliEquivalent(s) IV Intermittent once    MEDICATIONS  (PRN):  ondansetron Injectable 4 milliGRAM(s) IV Push every 6 hours PRN Nausea      VITALS:     Vital Signs Last 24 Hrs  T(C): 36.3 (04 Aug 2024 16:10), Max: 36.9 (04 Aug 2024 00:04)  T(F): 97.4 (04 Aug 2024 16:10), Max: 98.4 (04 Aug 2024 00:04)  HR: 63 (04 Aug 2024 16:10) (60 - 80)  BP: 116/74 (04 Aug 2024 16:10) (100/62 - 116/74)  BP(mean): 75 (04 Aug 2024 14:14) (75 - 75)  RR: 18 (04 Aug 2024 16:10) (18 - 18)  SpO2: 99% (04 Aug 2024 16:10) (98% - 99%)    Parameters below as of 04 Aug 2024 14:14  Patient On (Oxygen Delivery Method): room air            LABS:                                     10.8   6.56  )-----------( 137      ( 03 Aug 2024 20:14 )             32.8   08-03    136  |  98  |  20  ----------------------------<  97  3.3<L>   |  25  |  0.6<L>    Ca    8.3<L>      03 Aug 2024 20:14  Phos  2.4     08-03  Mg     2.0     08-03             Urinalysis Basic - ( 31 Jul 2024 20:00 )    Color: x / Appearance: x / SG: x / pH: x  Gluc: 121 mg/dL / Ketone: x  / Bili: x / Urobili: x   Blood: x / Protein: x / Nitrite: x   Leuk Esterase: x / RBC: x / WBC x   Sq Epi: x / Non Sq Epi: x / Bacteria: x      Culture - Urine (collected 30 Jul 2024 02:20)  Source: Clean Catch Clean Catch (Midstream)  Final Report (31 Jul 2024 07:17):    <10,000 CFU/mL Normal Urogenital Lucinda      RADIOLOGY:        < from: Xray Chest 1 View- PORTABLE-Urgent (Xray Chest 1 View- PORTABLE-Urgent .) (07.30.24 @ 00:15) >    Impression:    Masslike processin the left retrocardiac region measuring 2.3 cm    Neoplasm/malignancy is not excluded    Postcontrast chest CT is recommended for further evaluation.    --- End of Report ---    ***Please see the addendum at the top of this report. It may contain   additional important information or changes.****    < end of copied text >    < from: CT Abdomen and Pelvis w/ Oral Cont (07.30.24 @ 02:36) >    IMPRESSION:  Mildly nonspecific distended distal small bowel loop in the pelvis   without definite evidence of a small bowel obstruction. The oral contrast   has not yet transverse to the colon. Short-term follow-up radiograph in a   2 hours can be obtained to confirm appropriate transition.        --- End of Report ---    < end of copied text >    < from: Xray Kidney Ureter Bladder (07.30.24 @ 05:01) >    Findings/  impression:    No evidence of small bowel obstruction or pneumoperitoneum. Several   prominent small bowel loops project over the lower abdomen. These measure   up to 2.7 cm. Oral contrast material is noted within. Fecal material is   noted in the colon.    --- End of Report ---    < end of copied text >    < from: Xray Abdomen Minimum 3 Views (07.30.24 @ 12:27) >    Findings/  impression:    There may be a small amount of oral contrast within the left colon.    Again seen are prominent small bowel loops overlying the lower abdomen   measuring up to 3.4 cm.    Large colonic stool load.    Previously noted retrocardiac masslike process within the chest is not   well-seen on this examination. Findings may berelated to differences in   technique.    However, chest CT is recommended based on the prior findings.    Stable cardiomediastinal silhouette.    Stable osseous structures.    --- End of Report ---    < end of copied text >    '< from: Xray Chest 1 View AP/PA (07.30.24 @ 15:47) >  Impression:    Previously noted masslike process in the left retrocardiac area,   paraspinal location, is again seen.    Further evaluation with chest CT is recommended.    --- End of Report ---    < end of copied text >    < from: Xray Abdomen Minimum 3 Views (07.31.24 @ 15:36) >  Findings/  impression:    Distended loops of small bowel with contrast in the differential   air-fluid levels recognize consistent with at least partial small bowel   obstruction.    Enteric catheter extends below the hemidiaphragm.    There is no organomegaly.    Small bilateral effusions.    --- End of Report ---        < end of copied text >  < from: Xray Kidney Ureter Bladder (07.31.24 @ 17:10) >  Findings/  impression: Enteric tube is in place. Persistent small bowel dilation up   to 4.5 cm. Stool seen in the colon. No free air.    --- End of Report ---    < end of copied text >  < from: Xray Kidney Ureter Bladder (07.31.24 @ 23:47) >  Findings/  impression: Enteric tube is stable in the stomach. Stable to minimal   decrease in caliber of the dilated small bowel. Stable stool in the   colon. No significant gas in the rectum.    --- End of Report ---    < end of copied text >    < from: CT Abdomen and Pelvis w/ Oral Cont (08.01.24 @ 14:29) >  IMPRESSION:    New dilated loops of small bowel with a transition point in the right   lower abdomen. Additional loops of dilated bowel in the pelvis with no   intraluminal contrast, may represent an underlying closed loop   obstruction.    Ascending colonic wall thickening may reflect colitis.    New small bilateral pleural effusions. New moderate abdominal pelvic   ascites.    Small pericardial effusion.      --- End of Report ---    ***Please see the addendum at the top of this report. It may contain   additional important information or changes.****    < end of copied text >      < from: CT Abdomen and Pelvis w/ Oral Cont (08.01.24 @ 14:29) >  NTERPRETATION:  Clinical Indication: Concern for small bowel obstruction.    Technique: Multidetector-row CT images of the abdomen and pelvis were   obtained from the xiphoid through the symphysis pubis. No contrast was   administered. Coronal and sagittal reconstructions were performed.    Comparison: CT abdomen and pelvis 7/30/2024    Findings:    01. LIVER: Normal unenhanced.  02. SPLEEN: Normal unenhanced.  03. PANCREAS: Normal unenhanced.  04.GALLBLADDER/BILIARY TREE: No biliary duct dilatation.  Normal   gallbladder.  05. ADRENALS: Normal.  06. KIDNEYS: Symmetric in size.  No hydronephrosis or renal calculi.  07. LYMPHADENOPATHY/RETROPERITONEUM: No lymphadenopathy.  08. BOWEL: Enteric tube terminating within the stomach New dilated loops   of small bowel measuring up to 3.8 cm with a transition point in the   right lower abdomen (series 602 image 45). Additional loops of dilated   bowel in the pelvis measuring 3.2 cm (series 5 image 81) with no   intraluminal contrast. Underlying closed loop obstruction is not   excluded. Ascending colonic wall thickening may reflect colitis.   Questionable small hiatal hernia.  09. PELVIC VISCERA: Uterine fundal calcification versus pelvic phlebolith   may represent a small fibroid. Underdistended urinary bladder.  10. PELVIC LYMPH NODES: No lymphadenopathy.  11. VASCULATURE: No abdominal aortic aneurysm.  12. PERITONEUM/ABDOMINAL WALL: Moderate abdominopelvic ascites.  13. SKELETAL: Degenerative changes.  14. LUNG BASES: New small bilateral pleural effusions. Areas of small   atelectasis. Small pericardial effusion.    IMPRESSION:    New dilated loops of small bowel with a transition point in the right   lower abdomen. Additional loops of dilated bowel in the pelvis with no   intraluminal contrast, may represent an underlying closed loop   obstruction.    Ascending colonic wall thickening may reflect colitis.    New small bilateral pleural effusions. New moderate abdominal pelvic   ascites.    Small pericardial effusion.      --- End of Report ---    < end of copied text >        PHYSICAL EXAM:  PHYSICAL EXAM:  General: NAD, calm and cooperative  HEENT: NG tube in place, NCAT, EOMI, Trachea ML, Neck supple  Cardiac: RRR  Respiratory: No accessory muscle use, b/l chest rise and fall, normal respiratory effort  Abdomen: Distended, mild diffuse TTP, hypoactive BS x4 quadrants   Musculoskeletal: ROM intact, compartments soft  Vascular: Extremities well perfused  Skin: Warm/dry, normal color, no jaundice      ASSESSMENT AND PLAN:

## 2024-08-04 NOTE — PROGRESS NOTE ADULT - SUBJECTIVE AND OBJECTIVE BOX
GENERAL SURGERY PROGRESS NOTE    Patient: RHONDA WOODRUFF , 74y ([birth year])Female   MRN: [id number]  Location: [address]  Visit: 07-30-24 Inpatient  Date: 08-04-24 @ 01:54    Hospital Day #:7  Post-Op Day #:2    Procedure/Dx/Injuries: Exploratory laparotomy with small bowel resection    Events of past 24 hours: No acute events overnight. Passed TOV and had passed gas. No bowel movement.     PAST MEDICAL & SURGICAL HISTORY:  Osteoporosis  Multiple closed and open fractures  Toe fracture, right  Dry eye  Retinal tear, left  Elbow fracture, left  S/P appendectomy      Vitals:   T(F): 98.4 (08-04-24 @ 00:04), Max: 98.4 (08-03-24 @ 07:27)  HR: 66 (08-04-24 @ 00:04)  BP: 112/70 (08-04-24 @ 00:04)  RR: 18 (08-04-24 @ 00:04)  SpO2: 98% (08-04-24 @ 00:04)      Diet, NPO:   Except Medications     Special Instructions for Nursing:  Except Medications      Fluids: lactated ringers.: Solution, 1000 milliLiter(s) infuse at 75 mL/Hr      I & O's:    08-02-24 @ 07:01  -  08-03-24 @ 07:00  --------------------------------------------------------  IN:  Total IN: 0 mL    OUT:    Voided (mL): 600 mL  Total OUT: 600 mL    Total NET: -600 mL      Bowel Movement: : NO  Flatus: : YES     PHYSICAL EXAM:  General: NAD, resting comfortably in bed  Pulmonary: b/l equal chest rise, no respiratory distress  Abdominal: soft, midline incision wound vac noted. No surrounding erythema, discharge noted. NGT tube intact.  Extremities: WWP, normal strength  Neuro: A/O x 3, CNs II-XII grossly intact, normal motor/sensation, no focal deficits      MEDICATIONS  (STANDING):  bisacodyl Suppository 10 milliGRAM(s) Rectal <User Schedule>  enoxaparin Injectable 40 milliGRAM(s) SubCutaneous every 24 hours  lactated ringers. 1000 milliLiter(s) (75 mL/Hr) IV Continuous <Continuous>  pantoprazole  Injectable 40 milliGRAM(s) IV Push daily  potassium chloride  20 mEq/100 mL IVPB 20 milliEquivalent(s) IV Intermittent once    MEDICATIONS  (PRN):  ondansetron Injectable 4 milliGRAM(s) IV Push every 6 hours PRN Nausea      DVT PROPHYLAXIS: enoxaparin Injectable 40 milliGRAM(s) SubCutaneous every 24 hours    GI PROPHYLAXIS: pantoprazole  Injectable 40 milliGRAM(s) IV Push daily    ANTICOAGULATION:   ANTIBIOTICS:            LAB/STUDIES:  Labs:  CAPILLARY BLOOD GLUCOSE                          10.8   6.56  )-----------( 137      ( 03 Aug 2024 20:14 )             32.8         08-03    136  |  98  |  20  ----------------------------<  97  3.3<L>   |  25  |  0.6<L>      Calcium: 8.3 mg/dL (08-03-24 @ 20:14)    Urinalysis Basic - ( 03 Aug 2024 20:14 )    Color: x / Appearance: x / SG: x / pH: x  Gluc: 97 mg/dL / Ketone: x  / Bili: x / Urobili: x   Blood: x / Protein: x / Nitrite: x   Leuk Esterase: x / RBC: x / WBC x   Sq Epi: x / Non Sq Epi: x / Bacteria: x      IMAGING:    < from: CT Abdomen and Pelvis w/ Oral Cont (08.01.24 @ 14:29) >  New dilated loops of small bowel with a transition point in the right   lower abdomen. Additional loops of dilated bowel in the pelvis with no   intraluminal contrast, may represent an underlying closed loop   obstruction.    Ascending colonic wall thickening may reflect colitis.    New small bilateral pleural effusions. New moderate abdominal pelvic   ascites.    Small pericardial effusion.    ASSESSMENT:  74F w/ pmhx of appendectomy, GERD, osteoporosis, left elbow surgery, recent right foot surgery for hammer toe who presents complaining of abdominal pain, nausea, vomiting. Pt not passing BM's or flatus for several days. Imaging consistent with partial SBO.    PLAN:  - NPO with IVF until bowel function  - keep NGT overnight.  - Dulcolax suppository BID  - Pending KUB reading.       TRAUMA SURGERY SPECTRA: [id number] GENERAL SURGERY PROGRESS NOTE    Patient: RHONDA WOODRUFF , 74y ([birth year])Female   MRN: [id number]  Location: [address]  Visit: 07-30-24 Inpatient  Date: 08-04-24 @ 01:54    Hospital Day #:7  Post-Op Day #:2    Procedure/Dx/Injuries: Exploratory laparotomy with small bowel resection    Events of past 24 hours: No acute events overnight. Passed TOV No bowel movement.     PAST MEDICAL & SURGICAL HISTORY:  Osteoporosis  Multiple closed and open fractures  Toe fracture, right  Dry eye  Retinal tear, left  Elbow fracture, left  S/P appendectomy      Vitals:   T(F): 98.4 (08-04-24 @ 00:04), Max: 98.4 (08-03-24 @ 07:27)  HR: 66 (08-04-24 @ 00:04)  BP: 112/70 (08-04-24 @ 00:04)  RR: 18 (08-04-24 @ 00:04)  SpO2: 98% (08-04-24 @ 00:04)      Diet, NPO:   Except Medications     Special Instructions for Nursing:  Except Medications      Fluids: lactated ringers.: Solution, 1000 milliLiter(s) infuse at 75 mL/Hr      I & O's:    08-02-24 @ 07:01  -  08-03-24 @ 07:00  --------------------------------------------------------  IN:  Total IN: 0 mL    OUT:    Voided (mL): 600 mL  Total OUT: 600 mL    Total NET: -600 mL      Bowel Movement: : NO  Flatus: : YES     PHYSICAL EXAM:  General: NAD, resting comfortably in bed  Pulmonary: b/l equal chest rise, no respiratory distress  Abdominal: soft, midline incision wound vac noted. No surrounding erythema, discharge noted. NGT tube intact.  Extremities: WWP, normal strength  Neuro: A/O x 3, CNs II-XII grossly intact, normal motor/sensation, no focal deficits      MEDICATIONS  (STANDING):  bisacodyl Suppository 10 milliGRAM(s) Rectal <User Schedule>  enoxaparin Injectable 40 milliGRAM(s) SubCutaneous every 24 hours  lactated ringers. 1000 milliLiter(s) (75 mL/Hr) IV Continuous <Continuous>  pantoprazole  Injectable 40 milliGRAM(s) IV Push daily  potassium chloride  20 mEq/100 mL IVPB 20 milliEquivalent(s) IV Intermittent once    MEDICATIONS  (PRN):  ondansetron Injectable 4 milliGRAM(s) IV Push every 6 hours PRN Nausea      DVT PROPHYLAXIS: enoxaparin Injectable 40 milliGRAM(s) SubCutaneous every 24 hours    GI PROPHYLAXIS: pantoprazole  Injectable 40 milliGRAM(s) IV Push daily    ANTICOAGULATION:   ANTIBIOTICS:            LAB/STUDIES:  Labs:  CAPILLARY BLOOD GLUCOSE                          10.8   6.56  )-----------( 137      ( 03 Aug 2024 20:14 )             32.8         08-03    136  |  98  |  20  ----------------------------<  97  3.3<L>   |  25  |  0.6<L>      Calcium: 8.3 mg/dL (08-03-24 @ 20:14)    Urinalysis Basic - ( 03 Aug 2024 20:14 )    Color: x / Appearance: x / SG: x / pH: x  Gluc: 97 mg/dL / Ketone: x  / Bili: x / Urobili: x   Blood: x / Protein: x / Nitrite: x   Leuk Esterase: x / RBC: x / WBC x   Sq Epi: x / Non Sq Epi: x / Bacteria: x      IMAGING:    < from: CT Abdomen and Pelvis w/ Oral Cont (08.01.24 @ 14:29) >  New dilated loops of small bowel with a transition point in the right   lower abdomen. Additional loops of dilated bowel in the pelvis with no   intraluminal contrast, may represent an underlying closed loop   obstruction.    Ascending colonic wall thickening may reflect colitis.    New small bilateral pleural effusions. New moderate abdominal pelvic   ascites.    Small pericardial effusion.    ASSESSMENT:  74F w/ pmhx of appendectomy, GERD, osteoporosis, left elbow surgery, recent right foot surgery for hammer toe who presents complaining of abdominal pain, nausea, vomiting. Pt not passing BM's or flatus for several days. Imaging consistent with partial SBO.    PLAN:  - NPO with IVF until bowel function  - keep NGT overnight.  - Dulcolax suppository BID  - Pending KUB reading.       TRAUMA SURGERY SPECTRA: [id number]

## 2024-08-04 NOTE — PROGRESS NOTE ADULT - ASSESSMENT
74F w/ pmhx of appendectomy, GERD, osteoporosis, left elbow surgery, recent right foot surgery for hammer toe who presents complaining of abdominal pain, nausea, vomiting. Pt not passing BM's or flatus for several days. Imaging consistent with partial SBO. Pt is NPO with NGT to LIS.       Problem list:    #Abdominal pain/Nausea/Vomiting 2/2 partial SBO  #Small B/L pleural effusions  #Abdominal wall thickening possibly 2/2 Colitis   #Moderate abdominal pelvic ascites   #Hx of GERD   #Hx of osteoporosis   #Borderline hypokalemia (improved)        Plan:  s/p ex-lap + resection/EUNICE  - serial abdominal exams   - stool count and monitor for flatus  - NPO with NGT to LIS; monitor output   - Elevate HOB   - aspiration precautions   - daily KUB in the interim  - PRN pain scale   - monitor for fever, trend WBC, and other signs of infection; consider empiric ABx if indicated   - c/w Protonix 40mg IV qd  - would consider holding IVF/dec rate given effusions and monitoring   - consider CXR in the AM to reassess pleural effusions   - incentive spirometer (pt endorsing intermittent dyspnea)  - monitor electrolytes daily, consider KCl IV 20meq x1   - PT / ambulate daily  - Suppository BID      Plan of care discussed with patient and primary team.     Total time spent to complete patient's bedside assessment, reviewed medical chart, discussed medical plan of care with team was more than 35 minutes with >50% of time spent face to face with patient, discussion with patient/family and/or coordination of care 74F w/ pmhx of appendectomy, GERD, osteoporosis, left elbow surgery, recent right foot surgery for hammer toe who presents complaining of abdominal pain, nausea, vomiting. Pt not passing BM's or flatus for several days. Imaging consistent with partial SBO. Pt is NPO with NGT to LIS.       Problem list:    #Abdominal pain/Nausea/Vomiting 2/2 partial SBO  #Small B/L pleural effusions  #Abdominal wall thickening possibly 2/2 Colitis   #Moderate abdominal pelvic ascites   #Hx of GERD   #Hx of osteoporosis   #Borderline hypokalemia (improved)        Plan:  s/p ex-lap + resection/EUNICE  - serial abdominal exams   - stool count and monitor for flatus  - NPO with NGT to LIS; monitor output   - Elevate HOB   - aspiration precautions   - daily KUB in the interim  - PRN pain scale   - monitor for fever, trend WBC, and other signs of infection  - trend H/H   - c/w Protonix 40mg IV qd  - incentive spirometer (pt endorsing intermittent dyspnea)  - monitor electrolytes daily, consider KCl IV 20meq x1   - PT / ambulate daily  - Suppository BID      Plan of care discussed with patient and primary team. Plan of care discussed with covering nurse.    Total time spent to complete patient's bedside assessment, reviewed medical chart, discussed medical plan of care with team was more than 35 minutes with >50% of time spent face to face with patient, discussion with patient/family and/or coordination of care

## 2024-08-05 LAB
BASOPHILS # BLD AUTO: 0.01 K/UL — SIGNIFICANT CHANGE UP (ref 0–0.2)
BASOPHILS NFR BLD AUTO: 0.2 % — SIGNIFICANT CHANGE UP (ref 0–1)
EOSINOPHIL # BLD AUTO: 0.07 K/UL — SIGNIFICANT CHANGE UP (ref 0–0.7)
EOSINOPHIL NFR BLD AUTO: 1.4 % — SIGNIFICANT CHANGE UP (ref 0–8)
HCT VFR BLD CALC: 31.3 % — LOW (ref 37–47)
HGB BLD-MCNC: 10.2 G/DL — LOW (ref 12–16)
IMM GRANULOCYTES NFR BLD AUTO: 0.4 % — HIGH (ref 0.1–0.3)
LYMPHOCYTES # BLD AUTO: 0.53 K/UL — LOW (ref 1.2–3.4)
LYMPHOCYTES # BLD AUTO: 10.4 % — LOW (ref 20.5–51.1)
MCHC RBC-ENTMCNC: 26.4 PG — LOW (ref 27–31)
MCHC RBC-ENTMCNC: 32.6 G/DL — SIGNIFICANT CHANGE UP (ref 32–37)
MCV RBC AUTO: 80.9 FL — LOW (ref 81–99)
MONOCYTES # BLD AUTO: 0.54 K/UL — SIGNIFICANT CHANGE UP (ref 0.1–0.6)
MONOCYTES NFR BLD AUTO: 10.5 % — HIGH (ref 1.7–9.3)
NEUTROPHILS # BLD AUTO: 3.95 K/UL — SIGNIFICANT CHANGE UP (ref 1.4–6.5)
NEUTROPHILS NFR BLD AUTO: 77.1 % — HIGH (ref 42.2–75.2)
NRBC # BLD: 0 /100 WBCS — SIGNIFICANT CHANGE UP (ref 0–0)
PLATELET # BLD AUTO: 149 K/UL — SIGNIFICANT CHANGE UP (ref 130–400)
PMV BLD: 11.4 FL — HIGH (ref 7.4–10.4)
RBC # BLD: 3.87 M/UL — LOW (ref 4.2–5.4)
RBC # FLD: 14.4 % — SIGNIFICANT CHANGE UP (ref 11.5–14.5)
WBC # BLD: 5.12 K/UL — SIGNIFICANT CHANGE UP (ref 4.8–10.8)
WBC # FLD AUTO: 5.12 K/UL — SIGNIFICANT CHANGE UP (ref 4.8–10.8)

## 2024-08-05 PROCEDURE — 99232 SBSQ HOSP IP/OBS MODERATE 35: CPT | Mod: 24,25

## 2024-08-05 PROCEDURE — 99232 SBSQ HOSP IP/OBS MODERATE 35: CPT

## 2024-08-05 RX ORDER — ASPIRIN 325 MG
10 TABLET ORAL DAILY
Refills: 0 | Status: DISCONTINUED | OUTPATIENT
Start: 2024-08-05 | End: 2024-08-05

## 2024-08-05 RX ORDER — SODIUM PHOSPHATE, MONOBASIC, MONOHYDRATE 276; 142 MG/ML; MG/ML
15 INJECTION, SOLUTION INTRAVENOUS ONCE
Refills: 0 | Status: COMPLETED | OUTPATIENT
Start: 2024-08-05 | End: 2024-08-05

## 2024-08-05 RX ORDER — ASPIRIN 325 MG
10 TABLET ORAL ONCE
Refills: 0 | Status: COMPLETED | OUTPATIENT
Start: 2024-08-05 | End: 2024-08-05

## 2024-08-05 RX ORDER — ASPIRIN 325 MG
10 TABLET ORAL
Refills: 0 | Status: DISCONTINUED | OUTPATIENT
Start: 2024-08-05 | End: 2024-08-09

## 2024-08-05 RX ADMIN — ENOXAPARIN SODIUM 40 MILLIGRAM(S): 120 INJECTION SUBCUTANEOUS at 23:42

## 2024-08-05 RX ADMIN — Medication 10 MILLIGRAM(S): at 17:53

## 2024-08-05 RX ADMIN — DEXTROSE MONOHYDRATE, SODIUM CHLORIDE, SODIUM LACTATE, CALCIUM CHLORIDE, MAGNESIUM CHLORIDE 75 MILLILITER(S): 1.5; 538; 448; 18.4; 5.08 SOLUTION INTRAPERITONEAL at 05:36

## 2024-08-05 RX ADMIN — Medication 10 MILLIGRAM(S): at 05:36

## 2024-08-05 RX ADMIN — DEXTROSE MONOHYDRATE, SODIUM CHLORIDE, SODIUM LACTATE, CALCIUM CHLORIDE, MAGNESIUM CHLORIDE 75 MILLILITER(S): 1.5; 538; 448; 18.4; 5.08 SOLUTION INTRAPERITONEAL at 23:42

## 2024-08-05 RX ADMIN — PANTOPRAZOLE SODIUM 40 MILLIGRAM(S): 20 TABLET, DELAYED RELEASE ORAL at 11:24

## 2024-08-05 RX ADMIN — SODIUM PHOSPHATE, MONOBASIC, MONOHYDRATE 63.75 MILLIMOLE(S): 276; 142 INJECTION, SOLUTION INTRAVENOUS at 02:15

## 2024-08-05 NOTE — PROGRESS NOTE ADULT - SUBJECTIVE AND OBJECTIVE BOX
SUBJECTIVE:    Patient is a 74y old Female who presents with a chief complaint of Abdominal pain, SBO versus diffuse colonic stool burden (01 Aug 2024 02:16)    Currently admitted to medicine with the primary diagnosis of Intractable abdominal pain       Patient seen and examined at bedside. This morning she is resting in bed and reports no new issues or overnight events. Has not has flatus but had a small BM. Noted that she ambulated in the AM and noted later in the afternoon to be ambulating with staff. Endorses using spirometer.    ROS:   CONSTITUTIONAL: No weakness, fevers or chills   EYES/ENT: No visual changes; No vertigo or throat pain   NECK: No pain or stiffness   RESPIRATORY: No cough, wheezing, hemoptysis; No shortness of breath   CARDIOVASCULAR: No chest pain or palpitations   GASTROINTESTINAL: No abdominal or epigastric pain. No nausea, vomiting, or hematemesis; No diarrhea or constipation. No melena or hematochezia.  GENITOURINARY: No dysuria, frequency or hematuria  NEUROLOGICAL: No numbness or weakness  SKIN: No itching, rashes      PAST MEDICAL & SURGICAL HISTORY  Osteoporosis    Multiple closed and open fractures    Toe fracture, right    Dry eyes    Retinal tear, left    Elbow fracture, left    S/P appendectomy        SOCIAL HISTORY:  Negative for smoking/alcohol/drug use.     ALLERGIES:  latex (Pruritus)  aspirin (Vomiting)  penicillin (Hives)  contrast media (iodine-based) (Flushing)  sulfa drugs (Pruritus; Rash)  Advil (Anaphylaxis)      MEDICATIONS:  STANDING MEDICATIONS  enoxaparin Injectable 40 milliGRAM(s) SubCutaneous every 24 hours  pantoprazole  Injectable 40 milliGRAM(s) IV Push daily  sodium chloride 0.9%. 1000 milliLiter(s) IV Continuous <Continuous>    PRN MEDICATIONS  ondansetron Injectable 4 milliGRAM(s) IV Push every 6 hours PRN    VITALS:   Vital Signs Last 24 Hrs  T(C): 37.4 (05 Aug 2024 16:00), Max: 37.4 (05 Aug 2024 16:00)  T(F): 99.4 (05 Aug 2024 16:00), Max: 99.4 (05 Aug 2024 16:00)  HR: 67 (05 Aug 2024 16:00) (50 - 72)  BP: 125/79 (05 Aug 2024 16:00) (110/59 - 125/79)  BP(mean): --  RR: 18 (05 Aug 2024 16:00) (18 - 18)  SpO2: 98% (05 Aug 2024 16:00) (94% - 99%)    Parameters below as of 05 Aug 2024 16:00  Patient On (Oxygen Delivery Method): room air        LABS:                          13.2   5.50  )-----------( 168      ( 31 Jul 2024 20:00 )             40.5     07-31    128<L>  |  95<L>  |  14  ----------------------------<  121<H>  3.9   |  24  |  0.7    Ca    9.0      31 Jul 2024 20:00  Phos  2.7     07-31  Mg     2.4     07-31        Urinalysis Basic - ( 31 Jul 2024 20:00 )    Color: x / Appearance: x / SG: x / pH: x  Gluc: 121 mg/dL / Ketone: x  / Bili: x / Urobili: x   Blood: x / Protein: x / Nitrite: x   Leuk Esterase: x / RBC: x / WBC x   Sq Epi: x / Non Sq Epi: x / Bacteria: x      Culture - Urine (collected 30 Jul 2024 02:20)  Source: Clean Catch Clean Catch (Midstream)  Final Report (31 Jul 2024 07:17):    <10,000 CFU/mL Normal Urogenital Lucinda      RADIOLOGY:    < from: Xray Chest 1 View- PORTABLE-Urgent (Xray Chest 1 View- PORTABLE-Urgent .) (07.30.24 @ 00:15) >    Impression:    Masslike processin the left retrocardiac region measuring 2.3 cm    Neoplasm/malignancy is not excluded    Postcontrast chest CT is recommended for further evaluation.    --- End of Report ---    ***Please see the addendum at the top of this report. It may contain   additional important information or changes.****    < end of copied text >    < from: CT Abdomen and Pelvis w/ Oral Cont (07.30.24 @ 02:36) >    IMPRESSION:  Mildly nonspecific distended distal small bowel loop in the pelvis   without definite evidence of a small bowel obstruction. The oral contrast   has not yet transverse to the colon. Short-term follow-up radiograph in a   2 hours can be obtained to confirm appropriate transition.        --- End of Report ---    < end of copied text >    < from: Xray Kidney Ureter Bladder (07.30.24 @ 05:01) >    Findings/  impression:    No evidence of small bowel obstruction or pneumoperitoneum. Several   prominent small bowel loops project over the lower abdomen. These measure   up to 2.7 cm. Oral contrast material is noted within. Fecal material is   noted in the colon.    --- End of Report ---    < end of copied text >    < from: Xray Abdomen Minimum 3 Views (07.30.24 @ 12:27) >    Findings/  impression:    There may be a small amount of oral contrast within the left colon.    Again seen are prominent small bowel loops overlying the lower abdomen   measuring up to 3.4 cm.    Large colonic stool load.    Previously noted retrocardiac masslike process within the chest is not   well-seen on this examination. Findings may berelated to differences in   technique.    However, chest CT is recommended based on the prior findings.    Stable cardiomediastinal silhouette.    Stable osseous structures.    --- End of Report ---    < end of copied text >    '< from: Xray Chest 1 View AP/PA (07.30.24 @ 15:47) >  Impression:    Previously noted masslike process in the left retrocardiac area,   paraspinal location, is again seen.    Further evaluation with chest CT is recommended.    --- End of Report ---    < end of copied text >    < from: Xray Abdomen Minimum 3 Views (07.31.24 @ 15:36) >  Findings/  impression:    Distended loops of small bowel with contrast in the differential   air-fluid levels recognize consistent with at least partial small bowel   obstruction.    Enteric catheter extends below the hemidiaphragm.    There is no organomegaly.    Small bilateral effusions.    --- End of Report ---        < end of copied text >  < from: Xray Kidney Ureter Bladder (07.31.24 @ 17:10) >  Findings/  impression: Enteric tube is in place. Persistent small bowel dilation up   to 4.5 cm. Stool seen in the colon. No free air.    --- End of Report ---    < end of copied text >  < from: Xray Kidney Ureter Bladder (07.31.24 @ 23:47) >  Findings/  impression: Enteric tube is stable in the stomach. Stable to minimal   decrease in caliber of the dilated small bowel. Stable stool in the   colon. No significant gas in the rectum.    --- End of Report ---    < end of copied text >    < from: CT Abdomen and Pelvis w/ Oral Cont (08.01.24 @ 14:29) >  IMPRESSION:    New dilated loops of small bowel with a transition point in the right   lower abdomen. Additional loops of dilated bowel in the pelvis with no   intraluminal contrast, may represent an underlying closed loop   obstruction.    Ascending colonic wall thickening may reflect colitis.    New small bilateral pleural effusions. New moderate abdominal pelvic   ascites.    Small pericardial effusion.      --- End of Report ---    ***Please see the addendum at the top of this report. It may contain   additional important information or changes.****    < end of copied text >      PHYSICAL EXAM:  PHYSICAL EXAM:  General: NAD, calm and cooperative  HEENT: NG tube in place, NCAT, EOMI, Trachea ML, Neck supple  Cardiac: RRR, no murmur  Respiratory: No accessory muscle use, b/l chest rise and fall, normal respiratory effort  Abdomen: Distended, mild diffuse TTP, hypoactive BS x4 quadrants   Musculoskeletal: ROM intact, compartments soft  Vascular: Extremities well perfused, trace b/l pitting edema   Skin: Warm/dry, normal color, no jaundice      ASSESSMENT AND PLAN:

## 2024-08-05 NOTE — PROGRESS NOTE ADULT - ASSESSMENT
74F w/ pmhx of appendectomy, GERD, osteoporosis, left elbow surgery, recent right foot surgery for hammer toe who presents complaining of abdominal pain, nausea, vomiting. Pt not passing BM's or flatus for several days. Imaging consistent with partial SBO. Pt is NPO with NGT to LIS.       Problem list:    #Abdominal pain/Nausea/Vomiting 2/2 partial SBO  #Small B/L pleural effusions (new) and trace B/L pitting edema likely 2/2 continuous isotonic IVF infusion  #Abdominal wall thickening possibly 2/2 Colitis   #Moderate abdominal pelvic ascites  #Hx of GERD   #Hx of osteoporosis   #Hypophosphatemia (repleted)        Plan:  - serial abdominal exams   - stool count and monitor for flatus  - indication for surgical intervention per primary team  - NPO with NGT to LIS; monitor output   - Elevate HOB   - aspiration precautions   - daily KUB in the interim   - PRN pain scale   - monitor for fever, trend WBC, and other signs of infection; consider empiric ABx if indicated   - c/w Protonix 40mg IV qd  - monitor I/O's and net balance     Total time spent to complete patient's bedside assessment, reviewed medical chart, discussed medical plan of care with team was more than 35 minutes with >50% of time spent face to face with patient, discussion with patient/family and/or coordination of care.

## 2024-08-05 NOTE — PROGRESS NOTE ADULT - SUBJECTIVE AND OBJECTIVE BOX
GENERAL SURGERY PROGRESS NOTE    Patient: RHONDA WOODRUFF , 74y ([birth year])Female   MRN: [id number]  Location: [address]  Visit: 07-30-24 Inpatient  Date: 08-05-24 @ 00:30    Hospital Day #: 8  Post-Op Day #: 3    Procedure/Dx/Injuries: s/p Exploratory laparotomy with small bowel resection    Events of past 24 hours: NAEO. No gas or BM. NGT output 150 cc. Ambulating with son, nurses, etc.     PAST MEDICAL & SURGICAL HISTORY:  Osteoporosis      Multiple closed and open fractures      Toe fracture, right      Dry eyes      Retinal tear, left      Elbow fracture, left      S/P appendectomy          Vitals:   T(F): 98.2 (08-04-24 @ 23:54), Max: 98.2 (08-04-24 @ 14:14)  HR: 69 (08-04-24 @ 23:54)  BP: 110/59 (08-04-24 @ 23:54)  RR: 18 (08-04-24 @ 23:54)  SpO2: 96% (08-04-24 @ 23:54)      Diet, NPO      Fluids:     I & O's:    08-03-24 @ 07:01  -  08-04-24 @ 07:00  --------------------------------------------------------  IN:    IV PiggyBack: 72.5 mL    Lactated Ringers: 1875 mL  Total IN: 1947.5 mL    OUT:    Indwelling Catheter - Urethral (mL): 300 mL    Nasogastric/Oral tube (mL): 200 mL    Stool (mL): 2 mL    Voided (mL): 575 mL  Total OUT: 1077 mL    Total NET: 870.5 mL        Bowel Movement: : [] YES [x] NO  Flatus: : [] YES [x] NO    PHYSICAL EXAM:  General: NAD, resting comfortably in bed  Pulmonary: b/l equal chest rise, no respiratory distress  Abdominal: soft, midline incision wound vac noted. No surrounding erythema, discharge noted. NGT tube intact.  Extremities: WWP, normal strength  Neuro: A/O x 3, CNs II-XII grossly intact, normal motor/sensation, no focal deficits    MEDICATIONS  (STANDING):  bisacodyl Suppository 10 milliGRAM(s) Rectal <User Schedule>  dextrose 5% + lactated ringers. 1000 milliLiter(s) (75 mL/Hr) IV Continuous <Continuous>  enoxaparin Injectable 40 milliGRAM(s) SubCutaneous every 24 hours  pantoprazole  Injectable 40 milliGRAM(s) IV Push daily  sodium phosphate 15 milliMole(s)/250 mL IVPB 15 milliMole(s) IV Intermittent once    MEDICATIONS  (PRN):  ondansetron Injectable 4 milliGRAM(s) IV Push every 6 hours PRN Nausea      DVT PROPHYLAXIS: enoxaparin Injectable 40 milliGRAM(s) SubCutaneous every 24 hours    GI PROPHYLAXIS: pantoprazole  Injectable 40 milliGRAM(s) IV Push daily    ANTICOAGULATION:   ANTIBIOTICS:            LAB/STUDIES:  Labs:  CAPILLARY BLOOD GLUCOSE                              10.6   6.33  )-----------( 137      ( 04 Aug 2024 20:56 )             32.9         08-04    138  |  98  |  19  ----------------------------<  106<H>  4.0   |  29  |  0.6<L>      Calcium: 8.6 mg/dL (08-04-24 @ 20:56)      LFTs:         Coags:            Urinalysis Basic - ( 04 Aug 2024 20:56 )    Color: x / Appearance: x / SG: x / pH: x  Gluc: 106 mg/dL / Ketone: x  / Bili: x / Urobili: x   Blood: x / Protein: x / Nitrite: x   Leuk Esterase: x / RBC: x / WBC x   Sq Epi: x / Non Sq Epi: x / Bacteria: x          Imaging: < from: Xray Kidney Ureter Bladder (08.04.24 @ 08:52) >  impression:    The patient has an enteric catheter. Contrast within right   colon. Distended loops of small bowel. No organomegaly.    Findings may be indicative of partial small bowel obstruction versus   ileus.               GENERAL SURGERY PROGRESS NOTE    Patient: RHONDA WOODRUFF , 74y ([birth year])Female   MRN: [id number]  Location: [address]  Visit: 07-30-24 Inpatient  Date: 08-05-24 @ 00:30    Hospital Day #: 8  Post-Op Day #: 3    Procedure/Dx/Injuries: s/p Exploratory laparotomy with small bowel resection    Events of past 24 hours: NAEO. No gas or BM. NGT output 150 cc. Ambulating with son, nurses, etc. Phosphate repleted    PAST MEDICAL & SURGICAL HISTORY:  Osteoporosis      Multiple closed and open fractures      Toe fracture, right      Dry eyes      Retinal tear, left      Elbow fracture, left      S/P appendectomy          Vitals:   T(F): 98.2 (08-04-24 @ 23:54), Max: 98.2 (08-04-24 @ 14:14)  HR: 69 (08-04-24 @ 23:54)  BP: 110/59 (08-04-24 @ 23:54)  RR: 18 (08-04-24 @ 23:54)  SpO2: 96% (08-04-24 @ 23:54)      Diet, NPO      Fluids:     I & O's:    08-03-24 @ 07:01  -  08-04-24 @ 07:00  --------------------------------------------------------  IN:    IV PiggyBack: 72.5 mL    Lactated Ringers: 1875 mL  Total IN: 1947.5 mL    OUT:    Indwelling Catheter - Urethral (mL): 300 mL    Nasogastric/Oral tube (mL): 200 mL    Stool (mL): 2 mL    Voided (mL): 575 mL  Total OUT: 1077 mL    Total NET: 870.5 mL        Bowel Movement: : [] YES [x] NO  Flatus: : [] YES [x] NO    PHYSICAL EXAM:  General: NAD, resting comfortably in bed  Pulmonary: b/l equal chest rise, no respiratory distress  Abdominal: soft, midline incision wound vac noted. No surrounding erythema, discharge noted. NGT tube intact.  Extremities: WWP, normal strength  Neuro: A/O x 3, CNs II-XII grossly intact, normal motor/sensation, no focal deficits    MEDICATIONS  (STANDING):  bisacodyl Suppository 10 milliGRAM(s) Rectal <User Schedule>  dextrose 5% + lactated ringers. 1000 milliLiter(s) (75 mL/Hr) IV Continuous <Continuous>  enoxaparin Injectable 40 milliGRAM(s) SubCutaneous every 24 hours  pantoprazole  Injectable 40 milliGRAM(s) IV Push daily  sodium phosphate 15 milliMole(s)/250 mL IVPB 15 milliMole(s) IV Intermittent once    MEDICATIONS  (PRN):  ondansetron Injectable 4 milliGRAM(s) IV Push every 6 hours PRN Nausea      DVT PROPHYLAXIS: enoxaparin Injectable 40 milliGRAM(s) SubCutaneous every 24 hours    GI PROPHYLAXIS: pantoprazole  Injectable 40 milliGRAM(s) IV Push daily    ANTICOAGULATION:   ANTIBIOTICS:            LAB/STUDIES:  Labs:  CAPILLARY BLOOD GLUCOSE                              10.6   6.33  )-----------( 137      ( 04 Aug 2024 20:56 )             32.9         08-04    138  |  98  |  19  ----------------------------<  106<H>  4.0   |  29  |  0.6<L>      Calcium: 8.6 mg/dL (08-04-24 @ 20:56)      LFTs:         Coags:            Urinalysis Basic - ( 04 Aug 2024 20:56 )    Color: x / Appearance: x / SG: x / pH: x  Gluc: 106 mg/dL / Ketone: x  / Bili: x / Urobili: x   Blood: x / Protein: x / Nitrite: x   Leuk Esterase: x / RBC: x / WBC x   Sq Epi: x / Non Sq Epi: x / Bacteria: x          Imaging: < from: Xray Kidney Ureter Bladder (08.04.24 @ 08:52) >  impression:    The patient has an enteric catheter. Contrast within right   colon. Distended loops of small bowel. No organomegaly.    Findings may be indicative of partial small bowel obstruction versus   ileus.

## 2024-08-05 NOTE — PROGRESS NOTE ADULT - ASSESSMENT
ASSESSMENT:  74y F w/ PMHx of appendectomy, GERD, osteoporosis, left elbow surgery, recent right foot surgery for hammer toe who presents complaining of abdominal pain, nausea, vomiting. Pt not passing BM's or flatus for several days. Imaging consistent with partial SBO.    PLAN:  - NPO with IVF until bowel function  - keep NGT overnight.  - Dulcolax suppository BID  - f/u AM KUB      TRAUMA SURGERY SPECTRA: [id number]

## 2024-08-06 LAB
ALBUMIN SERPL ELPH-MCNC: 3.3 G/DL — LOW (ref 3.5–5.2)
ALBUMIN SERPL ELPH-MCNC: 3.3 G/DL — LOW (ref 3.5–5.2)
ALP SERPL-CCNC: 64 U/L — SIGNIFICANT CHANGE UP (ref 30–115)
ALP SERPL-CCNC: 69 U/L — SIGNIFICANT CHANGE UP (ref 30–115)
ALT FLD-CCNC: 11 U/L — SIGNIFICANT CHANGE UP (ref 0–41)
ALT FLD-CCNC: 12 U/L — SIGNIFICANT CHANGE UP (ref 0–41)
ANION GAP SERPL CALC-SCNC: 11 MMOL/L — SIGNIFICANT CHANGE UP (ref 7–14)
ANION GAP SERPL CALC-SCNC: 11 MMOL/L — SIGNIFICANT CHANGE UP (ref 7–14)
ANION GAP SERPL CALC-SCNC: 14 MMOL/L — SIGNIFICANT CHANGE UP (ref 7–14)
ANION GAP SERPL CALC-SCNC: 8 MMOL/L — SIGNIFICANT CHANGE UP (ref 7–14)
AST SERPL-CCNC: 19 U/L — SIGNIFICANT CHANGE UP (ref 0–41)
AST SERPL-CCNC: 19 U/L — SIGNIFICANT CHANGE UP (ref 0–41)
BASOPHILS # BLD AUTO: 0.01 K/UL — SIGNIFICANT CHANGE UP (ref 0–0.2)
BASOPHILS NFR BLD AUTO: 0.2 % — SIGNIFICANT CHANGE UP (ref 0–1)
BILIRUB SERPL-MCNC: 0.5 MG/DL — SIGNIFICANT CHANGE UP (ref 0.2–1.2)
BILIRUB SERPL-MCNC: 0.5 MG/DL — SIGNIFICANT CHANGE UP (ref 0.2–1.2)
BUN SERPL-MCNC: 11 MG/DL — SIGNIFICANT CHANGE UP (ref 10–20)
BUN SERPL-MCNC: 6 MG/DL — LOW (ref 10–20)
BUN SERPL-MCNC: 8 MG/DL — LOW (ref 10–20)
BUN SERPL-MCNC: 9 MG/DL — LOW (ref 10–20)
CALCIUM SERPL-MCNC: 8 MG/DL — LOW (ref 8.4–10.5)
CALCIUM SERPL-MCNC: 8.2 MG/DL — LOW (ref 8.4–10.5)
CALCIUM SERPL-MCNC: 8.4 MG/DL — SIGNIFICANT CHANGE UP (ref 8.4–10.5)
CALCIUM SERPL-MCNC: 8.4 MG/DL — SIGNIFICANT CHANGE UP (ref 8.4–10.5)
CHLORIDE SERPL-SCNC: 95 MMOL/L — LOW (ref 98–110)
CHLORIDE SERPL-SCNC: 96 MMOL/L — LOW (ref 98–110)
CHLORIDE SERPL-SCNC: 96 MMOL/L — LOW (ref 98–110)
CHLORIDE SERPL-SCNC: 97 MMOL/L — LOW (ref 98–110)
CO2 SERPL-SCNC: 27 MMOL/L — SIGNIFICANT CHANGE UP (ref 17–32)
CO2 SERPL-SCNC: 27 MMOL/L — SIGNIFICANT CHANGE UP (ref 17–32)
CO2 SERPL-SCNC: 30 MMOL/L — SIGNIFICANT CHANGE UP (ref 17–32)
CO2 SERPL-SCNC: 31 MMOL/L — SIGNIFICANT CHANGE UP (ref 17–32)
CREAT SERPL-MCNC: 0.5 MG/DL — LOW (ref 0.7–1.5)
CREAT SERPL-MCNC: <0.5 MG/DL — LOW (ref 0.7–1.5)
EGFR: 104 ML/MIN/1.73M2 — SIGNIFICANT CHANGE UP
EGFR: 98 ML/MIN/1.73M2 — SIGNIFICANT CHANGE UP
EOSINOPHIL # BLD AUTO: 0.16 K/UL — SIGNIFICANT CHANGE UP (ref 0–0.7)
EOSINOPHIL NFR BLD AUTO: 3.2 % — SIGNIFICANT CHANGE UP (ref 0–8)
GLUCOSE SERPL-MCNC: 110 MG/DL — HIGH (ref 70–99)
GLUCOSE SERPL-MCNC: 110 MG/DL — HIGH (ref 70–99)
GLUCOSE SERPL-MCNC: 112 MG/DL — HIGH (ref 70–99)
GLUCOSE SERPL-MCNC: 93 MG/DL — SIGNIFICANT CHANGE UP (ref 70–99)
HCT VFR BLD CALC: 31.8 % — LOW (ref 37–47)
HGB BLD-MCNC: 10.4 G/DL — LOW (ref 12–16)
IMM GRANULOCYTES NFR BLD AUTO: 0.4 % — HIGH (ref 0.1–0.3)
LYMPHOCYTES # BLD AUTO: 0.61 K/UL — LOW (ref 1.2–3.4)
LYMPHOCYTES # BLD AUTO: 12.3 % — LOW (ref 20.5–51.1)
MAGNESIUM SERPL-MCNC: 1.7 MG/DL — LOW (ref 1.8–2.4)
MAGNESIUM SERPL-MCNC: 2 MG/DL — SIGNIFICANT CHANGE UP (ref 1.8–2.4)
MCHC RBC-ENTMCNC: 26.3 PG — LOW (ref 27–31)
MCHC RBC-ENTMCNC: 32.7 G/DL — SIGNIFICANT CHANGE UP (ref 32–37)
MCV RBC AUTO: 80.3 FL — LOW (ref 81–99)
MONOCYTES # BLD AUTO: 0.65 K/UL — HIGH (ref 0.1–0.6)
MONOCYTES NFR BLD AUTO: 13.1 % — HIGH (ref 1.7–9.3)
NEUTROPHILS # BLD AUTO: 3.51 K/UL — SIGNIFICANT CHANGE UP (ref 1.4–6.5)
NEUTROPHILS NFR BLD AUTO: 70.8 % — SIGNIFICANT CHANGE UP (ref 42.2–75.2)
NRBC # BLD: 0 /100 WBCS — SIGNIFICANT CHANGE UP (ref 0–0)
PHOSPHATE SERPL-MCNC: 2.1 MG/DL — SIGNIFICANT CHANGE UP (ref 2.1–4.9)
PHOSPHATE SERPL-MCNC: 2.6 MG/DL — SIGNIFICANT CHANGE UP (ref 2.1–4.9)
PLATELET # BLD AUTO: 172 K/UL — SIGNIFICANT CHANGE UP (ref 130–400)
PMV BLD: 11.7 FL — HIGH (ref 7.4–10.4)
POTASSIUM SERPL-MCNC: 2.7 MMOL/L — CRITICAL LOW (ref 3.5–5)
POTASSIUM SERPL-MCNC: 2.8 MMOL/L — LOW (ref 3.5–5)
POTASSIUM SERPL-MCNC: 3.6 MMOL/L — SIGNIFICANT CHANGE UP (ref 3.5–5)
POTASSIUM SERPL-MCNC: 3.7 MMOL/L — SIGNIFICANT CHANGE UP (ref 3.5–5)
POTASSIUM SERPL-SCNC: 2.7 MMOL/L — CRITICAL LOW (ref 3.5–5)
POTASSIUM SERPL-SCNC: 2.8 MMOL/L — LOW (ref 3.5–5)
POTASSIUM SERPL-SCNC: 3.6 MMOL/L — SIGNIFICANT CHANGE UP (ref 3.5–5)
POTASSIUM SERPL-SCNC: 3.7 MMOL/L — SIGNIFICANT CHANGE UP (ref 3.5–5)
PROT SERPL-MCNC: 5.1 G/DL — LOW (ref 6–8)
PROT SERPL-MCNC: 5.5 G/DL — LOW (ref 6–8)
RBC # BLD: 3.96 M/UL — LOW (ref 4.2–5.4)
RBC # FLD: 14.2 % — SIGNIFICANT CHANGE UP (ref 11.5–14.5)
SODIUM SERPL-SCNC: 134 MMOL/L — LOW (ref 135–146)
SODIUM SERPL-SCNC: 135 MMOL/L — SIGNIFICANT CHANGE UP (ref 135–146)
SODIUM SERPL-SCNC: 136 MMOL/L — SIGNIFICANT CHANGE UP (ref 135–146)
SODIUM SERPL-SCNC: 138 MMOL/L — SIGNIFICANT CHANGE UP (ref 135–146)
SURGICAL PATHOLOGY STUDY: SIGNIFICANT CHANGE UP
WBC # BLD: 4.96 K/UL — SIGNIFICANT CHANGE UP (ref 4.8–10.8)
WBC # FLD AUTO: 4.96 K/UL — SIGNIFICANT CHANGE UP (ref 4.8–10.8)

## 2024-08-06 PROCEDURE — 93010 ELECTROCARDIOGRAM REPORT: CPT

## 2024-08-06 PROCEDURE — 99232 SBSQ HOSP IP/OBS MODERATE 35: CPT

## 2024-08-06 PROCEDURE — 99232 SBSQ HOSP IP/OBS MODERATE 35: CPT | Mod: 24,25

## 2024-08-06 RX ORDER — POTASSIUM CHLORIDE 1500 MG/1
20 TABLET, EXTENDED RELEASE ORAL
Refills: 0 | Status: COMPLETED | OUTPATIENT
Start: 2024-08-06 | End: 2024-08-06

## 2024-08-06 RX ORDER — MAGNESIUM SULFATE 500 MG/ML
2 VIAL (ML) INJECTION ONCE
Refills: 0 | Status: COMPLETED | OUTPATIENT
Start: 2024-08-06 | End: 2024-08-06

## 2024-08-06 RX ORDER — SODIUM CHLORIDE AND POTASSIUM CHLORIDE 150; 450 MG/100ML; MG/100ML
1000 INJECTION, SOLUTION INTRAVENOUS
Refills: 0 | Status: DISCONTINUED | OUTPATIENT
Start: 2024-08-06 | End: 2024-08-08

## 2024-08-06 RX ORDER — CHLORHEXIDINE GLUCONATE 500 MG/1
1 CLOTH TOPICAL DAILY
Refills: 0 | Status: DISCONTINUED | OUTPATIENT
Start: 2024-08-06 | End: 2024-08-09

## 2024-08-06 RX ORDER — POTASSIUM PHOSPHATE, MONOBASIC AND POTASSIUM PHOSPHATE, DIBASIC 224; 236 MG/ML; MG/ML
30 INJECTION, SOLUTION INTRAVENOUS ONCE
Refills: 0 | Status: COMPLETED | OUTPATIENT
Start: 2024-08-06 | End: 2024-08-06

## 2024-08-06 RX ADMIN — Medication 10 MILLIGRAM(S): at 10:15

## 2024-08-06 RX ADMIN — POTASSIUM CHLORIDE 50 MILLIEQUIVALENT(S): 1500 TABLET, EXTENDED RELEASE ORAL at 17:38

## 2024-08-06 RX ADMIN — POTASSIUM CHLORIDE 50 MILLIEQUIVALENT(S): 1500 TABLET, EXTENDED RELEASE ORAL at 12:47

## 2024-08-06 RX ADMIN — CHLORHEXIDINE GLUCONATE 1 APPLICATION(S): 500 CLOTH TOPICAL at 11:51

## 2024-08-06 RX ADMIN — PANTOPRAZOLE SODIUM 40 MILLIGRAM(S): 20 TABLET, DELAYED RELEASE ORAL at 11:51

## 2024-08-06 RX ADMIN — Medication 10 MILLIGRAM(S): at 01:19

## 2024-08-06 RX ADMIN — ENOXAPARIN SODIUM 40 MILLIGRAM(S): 120 INJECTION SUBCUTANEOUS at 23:45

## 2024-08-06 RX ADMIN — Medication 25 GRAM(S): at 01:20

## 2024-08-06 RX ADMIN — POTASSIUM CHLORIDE 50 MILLIEQUIVALENT(S): 1500 TABLET, EXTENDED RELEASE ORAL at 10:19

## 2024-08-06 RX ADMIN — SODIUM CHLORIDE AND POTASSIUM CHLORIDE 75 MILLILITER(S): 150; 450 INJECTION, SOLUTION INTRAVENOUS at 10:57

## 2024-08-06 RX ADMIN — POTASSIUM PHOSPHATE, MONOBASIC AND POTASSIUM PHOSPHATE, DIBASIC 83.33 MILLIMOLE(S): 224; 236 INJECTION, SOLUTION INTRAVENOUS at 02:04

## 2024-08-06 NOTE — CONSULT NOTE ADULT - NSCONSULTADDITIONALINFOA_GEN_ALL_CORE
Replete magnesium and potassium. Follow up labs. If hemodynamics are affected, will consider transfer to SDU or SICU.    Francisco Guzman MD  Trauma/Acute Care Surgery/Surgical Critical Care Attending

## 2024-08-06 NOTE — PROGRESS NOTE ADULT - SUBJECTIVE AND OBJECTIVE BOX
SUBJECTIVE:    Patient is a 74y old Female who presents with a chief complaint of Abdominal pain, SBO versus diffuse colonic stool burden (01 Aug 2024 02:16)    Currently admitted to medicine with the primary diagnosis of Intractable abdominal pain       Patient seen and examined at bedside. This morning she is resting in bed and reports no new issues or overnight events. Has not has flatus but had a small BM. Noted that she ambulated in the AM and noted later in the afternoon to be ambulating with staff. Endorses using spirometer.    ROS:   CONSTITUTIONAL: No weakness, fevers or chills   EYES/ENT: No visual changes; No vertigo or throat pain   NECK: No pain or stiffness   RESPIRATORY: No cough, wheezing, hemoptysis; No shortness of breath   CARDIOVASCULAR: No chest pain or palpitations   GASTROINTESTINAL: No abdominal or epigastric pain. No nausea, vomiting, or hematemesis; No diarrhea or constipation. No melena or hematochezia.  GENITOURINARY: No dysuria, frequency or hematuria  NEUROLOGICAL: No numbness or weakness  SKIN: No itching, rashes      PAST MEDICAL & SURGICAL HISTORY  Osteoporosis    Multiple closed and open fractures    Toe fracture, right    Dry eyes    Retinal tear, left    Elbow fracture, left    S/P appendectomy        SOCIAL HISTORY:  Negative for smoking/alcohol/drug use.     ALLERGIES:  latex (Pruritus)  aspirin (Vomiting)  penicillin (Hives)  contrast media (iodine-based) (Flushing)  sulfa drugs (Pruritus; Rash)  Advil (Anaphylaxis)      MEDICATIONS:    MEDICATIONS  (STANDING):  chlorhexidine 2% Cloths 1 Application(s) Topical daily  dextrose 5% + sodium chloride 0.45% with potassium chloride 20 mEq/L 1000 milliLiter(s) (75 mL/Hr) IV Continuous <Continuous>  enoxaparin Injectable 40 milliGRAM(s) SubCutaneous every 24 hours  pantoprazole  Injectable 40 milliGRAM(s) IV Push daily    MEDICATIONS  (PRN):  bisacodyl Suppository 10 milliGRAM(s) Rectal <User Schedule> PRN Constipation  ondansetron Injectable 4 milliGRAM(s) IV Push every 6 hours PRN Nausea      VITALS:     Vital Signs Last 24 Hrs  T(C): 37.4 (06 Aug 2024 16:18), Max: 37.4 (06 Aug 2024 16:18)  T(F): 99.4 (06 Aug 2024 16:18), Max: 99.4 (06 Aug 2024 16:18)  HR: 76 (06 Aug 2024 16:18) (60 - 76)  BP: 120/70 (06 Aug 2024 16:18) (106/69 - 136/67)  BP(mean): --  RR: 18 (06 Aug 2024 16:18) (18 - 18)  SpO2: 97% (06 Aug 2024 16:18) (96% - 98%)    Parameters below as of 06 Aug 2024 16:18  Patient On (Oxygen Delivery Method): room air          LABS:                          10.2   5.12  )-----------( 149      ( 05 Aug 2024 22:47 )             31.3   08-06    135  |  96<L>  |  8<L>  ----------------------------<  112<H>  3.7   |  31  |  <0.5<L>    Ca    8.4      06 Aug 2024 13:14  Phos  2.4     08-06  Mg     2.2     08-06    TPro  5.5<L>  /  Alb  3.3<L>  /  TBili  0.5  /  DBili  x   /  AST  19  /  ALT  11  /  AlkPhos  69  08-06                  Urinalysis Basic - ( 31 Jul 2024 20:00 )    Color: x / Appearance: x / SG: x / pH: x  Gluc: 121 mg/dL / Ketone: x  / Bili: x / Urobili: x   Blood: x / Protein: x / Nitrite: x   Leuk Esterase: x / RBC: x / WBC x   Sq Epi: x / Non Sq Epi: x / Bacteria: x      Culture - Urine (collected 30 Jul 2024 02:20)  Source: Clean Catch Clean Catch (Midstream)  Final Report (31 Jul 2024 07:17):    <10,000 CFU/mL Normal Urogenital Lucinda      RADIOLOGY:        < from: Xray Chest 1 View- PORTABLE-Urgent (Xray Chest 1 View- PORTABLE-Urgent .) (07.30.24 @ 00:15) >    Impression:    Masslike processin the left retrocardiac region measuring 2.3 cm    Neoplasm/malignancy is not excluded    Postcontrast chest CT is recommended for further evaluation.    --- End of Report ---    ***Please see the addendum at the top of this report. It may contain   additional important information or changes.****    < end of copied text >    < from: CT Abdomen and Pelvis w/ Oral Cont (07.30.24 @ 02:36) >    IMPRESSION:  Mildly nonspecific distended distal small bowel loop in the pelvis   without definite evidence of a small bowel obstruction. The oral contrast   has not yet transverse to the colon. Short-term follow-up radiograph in a   2 hours can be obtained to confirm appropriate transition.        --- End of Report ---    < end of copied text >    < from: Xray Kidney Ureter Bladder (07.30.24 @ 05:01) >    Findings/  impression:    No evidence of small bowel obstruction or pneumoperitoneum. Several   prominent small bowel loops project over the lower abdomen. These measure   up to 2.7 cm. Oral contrast material is noted within. Fecal material is   noted in the colon.    --- End of Report ---    < end of copied text >    < from: Xray Abdomen Minimum 3 Views (07.30.24 @ 12:27) >    Findings/  impression:    There may be a small amount of oral contrast within the left colon.    Again seen are prominent small bowel loops overlying the lower abdomen   measuring up to 3.4 cm.    Large colonic stool load.    Previously noted retrocardiac masslike process within the chest is not   well-seen on this examination. Findings may berelated to differences in   technique.    However, chest CT is recommended based on the prior findings.    Stable cardiomediastinal silhouette.    Stable osseous structures.    --- End of Report ---    < end of copied text >    '< from: Xray Chest 1 View AP/PA (07.30.24 @ 15:47) >  Impression:    Previously noted masslike process in the left retrocardiac area,   paraspinal location, is again seen.    Further evaluation with chest CT is recommended.    --- End of Report ---    < end of copied text >    < from: Xray Abdomen Minimum 3 Views (07.31.24 @ 15:36) >  Findings/  impression:    Distended loops of small bowel with contrast in the differential   air-fluid levels recognize consistent with at least partial small bowel   obstruction.    Enteric catheter extends below the hemidiaphragm.    There is no organomegaly.    Small bilateral effusions.    --- End of Report ---        < end of copied text >  < from: Xray Kidney Ureter Bladder (07.31.24 @ 17:10) >  Findings/  impression: Enteric tube is in place. Persistent small bowel dilation up   to 4.5 cm. Stool seen in the colon. No free air.    --- End of Report ---    < end of copied text >  < from: Xray Kidney Ureter Bladder (07.31.24 @ 23:47) >  Findings/  impression: Enteric tube is stable in the stomach. Stable to minimal   decrease in caliber of the dilated small bowel. Stable stool in the   colon. No significant gas in the rectum.    --- End of Report ---    < end of copied text >    < from: CT Abdomen and Pelvis w/ Oral Cont (08.01.24 @ 14:29) >  IMPRESSION:    New dilated loops of small bowel with a transition point in the right   lower abdomen. Additional loops of dilated bowel in the pelvis with no   intraluminal contrast, may represent an underlying closed loop   obstruction.    Ascending colonic wall thickening may reflect colitis.    New small bilateral pleural effusions. New moderate abdominal pelvic   ascites.    Small pericardial effusion.      --- End of Report ---    ***Please see the addendum at the top of this report. It may contain   additional important information or changes.****    < end of copied text >    < from: Xray Kidney Ureter Bladder (08.04.24 @ 08:52) >  INTERPRETATION:  Clinical History / Reason for exam: Abdominal discomfort.    Multiple views of the abdomen. Comparison is made with imaging datedJuly   31, 2024.    Findings/  impression:    The patient has a rectal tube and enteric catheter. Contrast within right   colon. Distended loops of small bowel. No organomegaly.    Findings may be indicative of partial small bowel obstruction versus   ileus.    --- End of Report ---      < end of copied text >          PHYSICAL EXAM:  PHYSICAL EXAM:  General: NAD, calm and cooperative  HEENT: NG tube in place, NCAT, EOMI, Trachea ML, Neck supple  Cardiac: RRR, no murmur  Respiratory: No accessory muscle use, b/l chest rise and fall, normal respiratory effort  Abdomen: Distended, mild diffuse TTP, hypoactive BS x4 quadrants   Musculoskeletal: ROM intact, compartments soft  Vascular: Extremities well perfused, trace b/l pitting edema   Skin: Warm/dry, normal color, no jaundice      ASSESSMENT AND PLAN: SUBJECTIVE:    Patient is a 74y old Female who presents with a chief complaint of Abdominal pain, SBO versus diffuse colonic stool burden (01 Aug 2024 02:16)    Currently admitted to medicine with the primary diagnosis of Intractable abdominal pain       Patient seen and examined at bedside. This morning she is resting in bed and reports no new issues or overnight events. Has not has flatus but had another small BM. Pt is ambulating 1-2x daily and in her room. Endorses using spirometer.    ROS:   CONSTITUTIONAL: No weakness, fevers or chills   EYES/ENT: No visual changes; No vertigo or throat pain   NECK: No pain or stiffness   RESPIRATORY: No cough, wheezing, hemoptysis; No shortness of breath   CARDIOVASCULAR: No chest pain or palpitations   GASTROINTESTINAL: + abdominal pain and bloating. No nausea, vomiting, or hematemesis; No diarrhea or constipation. No melena or hematochezia.  GENITOURINARY: No dysuria, frequency or hematuria  NEUROLOGICAL: No numbness or weakness  SKIN: No itching, rashes      PAST MEDICAL & SURGICAL HISTORY  Osteoporosis    Multiple closed and open fractures    Toe fracture, right    Dry eyes    Retinal tear, left    Elbow fracture, left    S/P appendectomy        SOCIAL HISTORY:  Negative for smoking/alcohol/drug use.     ALLERGIES:  latex (Pruritus)  aspirin (Vomiting)  penicillin (Hives)  contrast media (iodine-based) (Flushing)  sulfa drugs (Pruritus; Rash)  Advil (Anaphylaxis)      MEDICATIONS:    MEDICATIONS  (STANDING):  chlorhexidine 2% Cloths 1 Application(s) Topical daily  dextrose 5% + sodium chloride 0.45% with potassium chloride 20 mEq/L 1000 milliLiter(s) (75 mL/Hr) IV Continuous <Continuous>  enoxaparin Injectable 40 milliGRAM(s) SubCutaneous every 24 hours  pantoprazole  Injectable 40 milliGRAM(s) IV Push daily    MEDICATIONS  (PRN):  bisacodyl Suppository 10 milliGRAM(s) Rectal <User Schedule> PRN Constipation  ondansetron Injectable 4 milliGRAM(s) IV Push every 6 hours PRN Nausea      VITALS:     Vital Signs Last 24 Hrs  T(C): 37.4 (06 Aug 2024 16:18), Max: 37.4 (06 Aug 2024 16:18)  T(F): 99.4 (06 Aug 2024 16:18), Max: 99.4 (06 Aug 2024 16:18)  HR: 76 (06 Aug 2024 16:18) (60 - 76)  BP: 120/70 (06 Aug 2024 16:18) (106/69 - 136/67)  BP(mean): --  RR: 18 (06 Aug 2024 16:18) (18 - 18)  SpO2: 97% (06 Aug 2024 16:18) (96% - 98%)    Parameters below as of 06 Aug 2024 16:18  Patient On (Oxygen Delivery Method): room air          LABS:                                     10.4   4.96  )-----------( 172      ( 06 Aug 2024 20:00 )             31.8     08-06    135  |  96<L>  |  8<L>  ----------------------------<  112<H>  3.7   |  31  |  <0.5<L>    Ca    8.4      06 Aug 2024 13:14  Phos  2.4     08-06  Mg     2.2     08-06    TPro  5.5<L>  /  Alb  3.3<L>  /  TBili  0.5  /  DBili  x   /  AST  19  /  ALT  11  /  AlkPhos  69  08-06                  Urinalysis Basic - ( 31 Jul 2024 20:00 )    Color: x / Appearance: x / SG: x / pH: x  Gluc: 121 mg/dL / Ketone: x  / Bili: x / Urobili: x   Blood: x / Protein: x / Nitrite: x   Leuk Esterase: x / RBC: x / WBC x   Sq Epi: x / Non Sq Epi: x / Bacteria: x      Culture - Urine (collected 30 Jul 2024 02:20)  Source: Clean Catch Clean Catch (Midstream)  Final Report (31 Jul 2024 07:17):    <10,000 CFU/mL Normal Urogenital Lucinda      RADIOLOGY:      < from: Xray Chest 1 View- PORTABLE-Urgent (Xray Chest 1 View- PORTABLE-Urgent .) (07.30.24 @ 00:15) >    Impression:    Masslike process in the left retrocardiac region measuring 2.3 cm    Neoplasm/malignancy is not excluded    Postcontrast chest CT is recommended for further evaluation.    --- End of Report ---    ***Please see the addendum at the top of this report. It may contain   additional important information or changes.****    < end of copied text >    < from: CT Abdomen and Pelvis w/ Oral Cont (07.30.24 @ 02:36) >    IMPRESSION:  Mildly nonspecific distended distal small bowel loop in the pelvis   without definite evidence of a small bowel obstruction. The oral contrast   has not yet transverse to the colon. Short-term follow-up radiograph in a   2 hours can be obtained to confirm appropriate transition.        --- End of Report ---    < end of copied text >    < from: Xray Kidney Ureter Bladder (07.30.24 @ 05:01) >    Findings/  impression:    No evidence of small bowel obstruction or pneumoperitoneum. Several   prominent small bowel loops project over the lower abdomen. These measure   up to 2.7 cm. Oral contrast material is noted within. Fecal material is   noted in the colon.    --- End of Report ---    < end of copied text >    < from: Xray Abdomen Minimum 3 Views (07.30.24 @ 12:27) >    Findings/  impression:    There may be a small amount of oral contrast within the left colon.    Again seen are prominent small bowel loops overlying the lower abdomen   measuring up to 3.4 cm.    Large colonic stool load.    Previously noted retrocardiac masslike process within the chest is not   well-seen on this examination. Findings may berelated to differences in   technique.    However, chest CT is recommended based on the prior findings.    Stable cardiomediastinal silhouette.    Stable osseous structures.    --- End of Report ---    < end of copied text >    '< from: Xray Chest 1 View AP/PA (07.30.24 @ 15:47) >  Impression:    Previously noted masslike process in the left retrocardiac area,   paraspinal location, is again seen.    Further evaluation with chest CT is recommended.    --- End of Report ---    < end of copied text >    < from: Xray Abdomen Minimum 3 Views (07.31.24 @ 15:36) >  Findings/  impression:    Distended loops of small bowel with contrast in the differential   air-fluid levels recognize consistent with at least partial small bowel   obstruction.    Enteric catheter extends below the hemidiaphragm.    There is no organomegaly.    Small bilateral effusions.    --- End of Report ---        < end of copied text >  < from: Xray Kidney Ureter Bladder (07.31.24 @ 17:10) >  Findings/  impression: Enteric tube is in place. Persistent small bowel dilation up   to 4.5 cm. Stool seen in the colon. No free air.    --- End of Report ---    < end of copied text >  < from: Xray Kidney Ureter Bladder (07.31.24 @ 23:47) >  Findings/  impression: Enteric tube is stable in the stomach. Stable to minimal   decrease in caliber of the dilated small bowel. Stable stool in the   colon. No significant gas in the rectum.    --- End of Report ---    < end of copied text >    < from: CT Abdomen and Pelvis w/ Oral Cont (08.01.24 @ 14:29) >  IMPRESSION:    New dilated loops of small bowel with a transition point in the right   lower abdomen. Additional loops of dilated bowel in the pelvis with no   intraluminal contrast, may represent an underlying closed loop   obstruction.    Ascending colonic wall thickening may reflect colitis.    New small bilateral pleural effusions. New moderate abdominal pelvic   ascites.    Small pericardial effusion.      --- End of Report ---    ***Please see the addendum at the top of this report. It may contain   additional important information or changes.****    < end of copied text >    < from: Xray Kidney Ureter Bladder (08.04.24 @ 08:52) >  INTERPRETATION:  Clinical History / Reason for exam: Abdominal discomfort.    Multiple views of the abdomen. Comparison is made with imaging datedJuly   31, 2024.    Findings/  impression:    The patient has a rectal tube and enteric catheter. Contrast within right   colon. Distended loops of small bowel. No organomegaly.    Findings may be indicative of partial small bowel obstruction versus   ileus.    --- End of Report ---      < end of copied text >          PHYSICAL EXAM:  PHYSICAL EXAM:  General: NAD, calm and cooperative  HEENT: NG tube in place, NCAT, EOMI, Trachea ML, Neck supple  Cardiac: RRR, no murmur  Respiratory: No accessory muscle use, b/l chest rise and fall, normal respiratory effort  Abdomen: Distended, mild diffuse TTP, hypoactive BS x4 quadrants   Musculoskeletal: ROM intact, compartments soft  Vascular: Extremities well perfused, trace b/l pitting edema   Skin: Warm/dry, normal color, no jaundice      ASSESSMENT AND PLAN:

## 2024-08-06 NOTE — CONSULT NOTE ADULT - ASSESSMENT
ASSESSMENT: Patient is a 74F w/ pmhx of appendectomy, GERD, osteoporosis, left elbow surgery, recent right foot surgery for hammer toe who presents complaining of abdominal pain, nausea, vomiting that began yesterday, but acutely worsened today around 2PM. Since that time the patient has had "several" episodes of nausea and vomiting that started out as food contents, and transitioned to stomach acid. She endorses bowel movements yesterday and today, no diarrhea, both normal in consistency.     Physical exam findings, imaging, and labs as documented above.    General Surgery team consulted for possible small bowel obstruction     PLAN:  -  -  -      Above plan discussed with Attending Surgeon  ***  , patient, patient family, and Primary team  07-30-24 @ 04:22
Patient is a 75y/o female with PMHx osteoporosis, GERD and PSHx of appendectomy, left elbow surgery, recent right foot surgery for hammer toe who presented c/o abdominal pain associated with nausea/vomiting. CT abd/pelvis on admission showed new dilated loops of small bowel with a transition point in the right lower abdomen. Patient is now s/p ex lap, EUNICE and SBR on 8/2. Patient recovering post-operatively on the floor with slow progression of bowel function.   SICU/SDU consulted this AM for hypokalemia to 2.8 with minimal T wave changes on EKG.     PLAN  - would continue to replete potassium to goal of 4  - give magnesium repletion (goal of 2) and calcium repletion as well   - consider removing NGT to minimize electrolyte disturbances  - repeat BMP and EKG after repletions  - can keep patient on remote telemetry until EKG stabilizes   - no indication for SDU/SICU at this time    Discussed with SICU attending Dr. Guzman
74F w/ pmhx of appendectomy, GERD, osteoporosis, left elbow surgery, recent right foot surgery for hammer toe who presents complaining of abdominal pain, nausea, vomiting. Pt not passing BM's or flatus for several days. Imaging consistent with partial SBO. Pt is NPO with NGT to LIS.       Problem list:    #Abdominal pain/Nausea/Vomiting 2/2 partial SBO  #Small B/L pleural effusions (new)  #Abdominal wall thickening possibly 2/2 Colitis   #Moderate abdominal pelvic ascites (new)  #Hx of GERD   #Hx of osteoporosis         Plan:  - serial abdominal exams   - stool count and monitor for flatus  - indication for surgical intervention per primary team  - NPO with NGT to LIS; monitor output   - Elevate HOB   - aspiration precautions   - daily KUB in the interim   - PRN pain scale   - monitor for fever, trend WBC, and other signs of infection; consider empiric ABx if indicated   - c/w Protonix 40mg IV qd  - would consider holding IVF/dec rate given new effusions and monitoring

## 2024-08-06 NOTE — PROGRESS NOTE ADULT - ASSESSMENT
ASSESSMENT:  74y F w/ PMHx of appendectomy, GERD, osteoporosis, left elbow surgery, recent right foot surgery for hammer toe who presents complaining of abdominal pain, nausea, vomiting. Pt not passing BM's or flatus for several days. Imaging consistent with partial SBO.      PLAN:  - NG tube out today  - F/u on diet tolerance  - F/u on BM  - Encourage IS use  - Encourage ambulation  - Monitor vitals  - Monitor electrolytes  - F/u on PT eval  - DVT ppx        [phone]

## 2024-08-06 NOTE — PROGRESS NOTE ADULT - ASSESSMENT
74F w/ pmhx of appendectomy, GERD, osteoporosis, left elbow surgery, recent right foot surgery for hammer toe who presents complaining of abdominal pain, nausea, vomiting. Pt not passing BM's or flatus for several days. Imaging consistent with partial SBO. Pt is NPO with NGT to LIS.       Problem list:    #Abdominal pain/Nausea/Vomiting 2/2 partial SBO  #Small B/L pleural effusions (new) and trace B/L pitting edema likely 2/2 continuous isotonic IVF infusion  #Abdominal wall thickening possibly 2/2 Colitis   #Moderate abdominal pelvic ascites  #Hx of GERD   #Hx of osteoporosis   #Hypophosphatemia (repleted)        Plan:  - serial abdominal exams   - stool count and monitor for flatus  - indication for surgical intervention per primary team  - NPO with NGT to LIS; monitor output   - Elevate HOB   - aspiration precautions   - daily KUB in the interim   - PRN pain scale   - monitor for fever, trend WBC, and other signs of infection; consider empiric ABx if indicated   - c/w Protonix 40mg IV qd  - monitor I/O's and net balance     Total time spent to complete patient's bedside assessment, reviewed medical chart, discussed medical plan of care with team was more than 35 minutes with >50% of time spent face to face with patient, discussion with patient/family and/or coordination of care.  74F w/ pmhx of appendectomy, GERD, osteoporosis, left elbow surgery, recent right foot surgery for hammer toe who presents complaining of abdominal pain, nausea, vomiting. Pt not passing BM's or flatus for several days. Imaging consistent with partial SBO. Pt is NPO with NGT to LIS.       Problem list:    #Abdominal pain/Nausea/Vomiting 2/2 partial SBO  #Small B/L pleural effusions (new) and trace B/L pitting edema likely 2/2 continuous isotonic IVF infusion  #Abdominal wall thickening possibly 2/2 Colitis   #Moderate abdominal pelvic ascites  #Mass-like L-retrocardiac process  #Hypokalemia (improved)  #Hx of GERD   #Hx of osteoporosis   #Hypophosphatemia (improved)        Plan:  - serial abdominal exams   - stool count and monitor for flatus  - indication for surgical intervention per primary team  - NPO with NGT to LIS; monitor output   - Elevate HOB   - aspiration precautions   - daily KUB in the interim   - PRN pain scale   - monitor for fever, trend WBC, and other signs of infection; consider empiric ABx if indicated   - c/w Protonix 40mg IV qd  - monitor I/O's and net balance   - Postcontrast chest CT to assess CXR finding when possible    Total time spent to complete patient's bedside assessment, reviewed medical chart, discussed medical plan of care with team was more than 35 minutes with >50% of time spent face to face with patient, discussion with patient/family and/or coordination of care.  74F w/ pmhx of appendectomy, GERD, osteoporosis, left elbow surgery, recent right foot surgery for hammer toe who presents complaining of abdominal pain, nausea, vomiting. Pt not passing BM's or flatus for several days. Imaging consistent with partial SBO. Pt is NPO with NGT to LIS.       Problem list:    #Abdominal pain/Nausea/Vomiting 2/2 partial SBO  #Small B/L pleural effusions (new) and BLE pitting edema   #Abdominal wall thickening possibly 2/2 Colitis   #Moderate abdominal pelvic ascites  #Mass-like L-retrocardiac process  #Hypokalemia (improved)  #Hx of GERD   #Hx of osteoporosis   #Hypophosphatemia (improved)        Plan:  - serial abdominal exams   - stool count and monitor for flatus  - indication for surgical intervention per primary team  - NPO with NGT to LIS; monitor output (removed but possibility that it will be placed again)  - Elevate HOB   - aspiration precautions   - daily KUB in the interim   - PRN pain scale   - monitor for fever, trend WBC, and other signs of infection; consider empiric ABx if indicated   - c/w Protonix 40mg IV qd  - monitor I/O's and net balance   - Postcontrast chest CT to assess CXR finding when possible  - Compression stockings to BLE    Case discussed with pt and primary team in detail.    Total time spent to complete patient's bedside assessment, reviewed medical chart, discussed medical plan of care with team was more than 35 minutes with >50% of time spent face to face with patient, discussion with patient/family and/or coordination of care.

## 2024-08-06 NOTE — CONSULT NOTE ADULT - SUBJECTIVE AND OBJECTIVE BOX
GENERAL SURGERY CONSULT NOTE    Patient: RHONDA WOODRUFF , 74y (50)Female   MRN: [id number]  Location: [facility] ED  Visit: 24 Emergency  Date: 24 @ 04:22    HPI: Patient is a 74F w/ pmhx of appendectomy, GERD, osteoporosis, left elbow surgery, recent right foot surgery for hammer toe who presents complaining of abdominal pain, nausea, vomiting that began yesterday, but acutely worsened today around 2PM. Since that time the patient has had "several" episodes of nausea and vomiting that started out as food contents, and transitioned to stomach acid. She endorses bowel movements yesterday and today, no diarrhea, both normal in consistency. She denies fevers, chest pain shortness of breath, or palpitations at this time.     PAST MEDICAL & SURGICAL HISTORY:  Osteoporosis  Multiple closed and open fractures  Toe fracture, right  Dry eyes  Retinal tear, left  Elbow fracture, left    Home Medications:    VITALS:  T(F): 97.6 (24 @ 22:41), Max: 97.6 (24 @ 22:41)  HR: 58 (24 @ 22:41) (58 - 58)  BP: 119/68 (24 @ 22:41) (119/68 - 119/68)  RR: 18 (24 @ 22:41) (18 - 18)  SpO2: 100% (24 @ 22:41) (100% - 100%)    PHYSICAL EXAM:  General: NAD, AAOx3, calm and cooperative  HEENT: NCAT, CHAGO, EOMI, Trachea ML, Neck supple  Cardiac: RRR S1, S2, no Murmurs, rubs or gallops  Respiratory: CTAB, normal respiratory effort, breath sounds equal BL, no wheeze, rhonchi or crackles  Abdomen: Soft, moderately distended, mild diffuse tenderness to palpation, worst in RLQ, no rebound, no guarding.   Musculoskeletal: Strength 5/5 BL UE/LE, ROM intact, compartments soft  Neuro: Sensation grossly intact and equal throughout, no focal deficits  Vascular: Pulses 2+ throughout, extremities well perfused  Skin: Warm/dry, normal color, no jaundice    MEDICATIONS  (STANDING):    MEDICATIONS  (PRN):    LAB/STUDIES:                        12.2   7.02  )-----------( 158      ( 2024 00:07 )             37.1         127<L>  |  90<L>  |  17  ----------------------------<  159<H>  4.8   |  24  |  0.8    Ca    9.6      2024 00:07    TPro  7.1  /  Alb  5.0  /  TBili  0.5  /  DBili  x   /  AST  25  /  ALT  21  /  AlkPhos  137<H>  07-30      LIVER FUNCTIONS - ( 2024 00:07 )  Alb: 5.0 g/dL / Pro: 7.1 g/dL / ALK PHOS: 137 U/L / ALT: 21 U/L / AST: 25 U/L / GGT: x           Urinalysis Basic - ( 2024 02:20 )    Color: Yellow / Appearance: Clear / S.015 / pH: x  Gluc: x / Ketone: Trace mg/dL  / Bili: Negative / Urobili: 0.2 mg/dL   Blood: x / Protein: Trace mg/dL / Nitrite: Negative   Leuk Esterase: Negative / RBC: x / WBC x   Sq Epi: x / Non Sq Epi: x / Bacteria: x    IMAGING:  < from: CT Abdomen and Pelvis w/ Oral Cont (24 @ 02:36) >  Mildly nonspecific distended distal small bowel loop in the pelvis   without definite evidence of a small bowel obstruction. The oral contrast   has not yet transverse to the colon. Short-term follow-up radiograph in a   2 hours can be obtained to confirm appropriate transition.    < end of copied text >    
RHONDA WOODRUFF   [id number]   [birth year]    74yF  ============================================================   DATE OF INITIAL SICU/SDU CONSULT: 08-06-24    INDICATION FOR SICU CONSULT:  hypokalemic, c/f EKG changes    [X] A ten-point review of systems was negative except as expressed in note.  [X] History was obtained from patient. If unable to participate in their care, history obtained from review of the chart and collateral sources of information.  ============================================================      HPI   HPI:  Patient is a 75y/o female with PMHx osteoporosis, GERD and PSHx of appendectomy, left elbow surgery, recent right foot surgery for hammer toe who presented c/o abdominal pain associated with nausea/vomiting. CT abd/pelvis on admission showed new dilated loops of small bowel with a transition point in the right lower abdomen. Patient is now s/p ex lap, EUNICE and SBR on 8/2. Patient recovering post-operatively on the floor with slow progression of bowel function.   SICU/SDU consulted this AM for hypokalemia to 2.8 with minimal T wave changes on EKG.     Patient seen and examined bedside; reports no complaints. Denies chest pain, SOB, palpitations. Patient remained hemodynamically stable.       PAST MEDICAL & SURGICAL HISTORY  Osteoporosis  Multiple closed and open fractures  Toe fracture, right  Dry eyes  Retinal tear, left      HOME MEDICATIONS      ACTIVE MEDICATIONS    bisacodyl Suppository 10 milliGRAM(s) Rectal <User Schedule> PRN Constipation  dextrose 5% + sodium chloride 0.45% with potassium chloride 20 mEq/L 1000 milliLiter(s) IV Continuous <Continuous>  enoxaparin Injectable 40 milliGRAM(s) SubCutaneous every 24 hours  ondansetron Injectable 4 milliGRAM(s) IV Push every 6 hours PRN Nausea  pantoprazole  Injectable 40 milliGRAM(s) IV Push daily    ALLERGIES  Allergies    latex (Pruritus)  aspirin (Vomiting)  penicillin (Hives)  contrast media (iodine-based) (Flushing)  sulfa drugs (Pruritus; Rash)  Advil (Anaphylaxis)    Intolerances    opioid-like analgesics (Vomiting)       VITAL SIGNS (Last 24Hours)  ICU Vital Signs Last 24 Hrs  T(C): 36.8 (06 Aug 2024 00:28), Max: 37.4 (05 Aug 2024 16:00)  T(F): 98.2 (06 Aug 2024 00:28), Max: 99.4 (05 Aug 2024 16:00)  HR: 69 (06 Aug 2024 00:28) (50 - 69)  BP: 118/74 (06 Aug 2024 00:28) (115/72 - 136/67)  RR: 18 (06 Aug 2024 00:28) (18 - 18)  SpO2: 97% (06 Aug 2024 00:28) (94% - 98%)    O2 Parameters below as of 05 Aug 2024 22:08  Patient On (Oxygen Delivery Method): room air          INTAKE/OUTPUT (Last 24Hours)  I&O's Summary    05 Aug 2024 07:01  -  06 Aug 2024 07:00  --------------------------------------------------------  IN: 525 mL / OUT: 1550 mL / NET: -1025 mL        LABS (Last 24Hours)  [08-06 @ 06:33:] Na 136 K 2.7 Cl 95 Mg ___ b>Phos ___ BUN/Cr 9/0.5>>>   [08-05 @ 22:47:] Na 138 K 2.8 Cl 97 Mg 1.7 b>Phos 2.1 BUN/Cr 11/0.5>>>           PHYSICAL EXAM   GCS:    15  Exam: A&Ox3, no focal deficits.     RESPIRATORY:  Normal expansion/effort on RA.     CARDIOVASCULAR:   S1, S2. RRR.     GASTROINTESTINAL:  Abdomen soft, non-distended, slightly tender. midline prevena in place holding suction. NGT in place.     MUSCULOSKELETAL:  Extremities warm, pink, well-perfused.    SKIN:  No skin breakdown.     
SUBJECTIVE:    Patient is a 74y old Female who presents with a chief complaint of Abdominal pain, SBO versus diffuse colonic stool burden (01 Aug 2024 02:16)    Currently admitted to medicine with the primary diagnosis of Intractable abdominal pain       Today is hospital day 2d. This morning she is resting comfortably in bed and reports no new issues or overnight events.     ROS:   CONSTITUTIONAL: No weakness, fevers or chills   EYES/ENT: No visual changes; No vertigo or throat pain   NECK: No pain or stiffness   RESPIRATORY: No cough, wheezing, hemoptysis; No shortness of breath   CARDIOVASCULAR: No chest pain or palpitations   GASTROINTESTINAL: No abdominal or epigastric pain. No nausea, vomiting, or hematemesis; No diarrhea or constipation. No melena or hematochezia.  GENITOURINARY: No dysuria, frequency or hematuria  NEUROLOGICAL: No numbness or weakness  SKIN: No itching, rashes      PAST MEDICAL & SURGICAL HISTORY  Osteoporosis    Multiple closed and open fractures    Toe fracture, right    Dry eyes    Retinal tear, left    Elbow fracture, left    S/P appendectomy        SOCIAL HISTORY:  Negative for smoking/alcohol/drug use.     ALLERGIES:  latex (Pruritus)  aspirin (Vomiting)  penicillin (Hives)  contrast media (iodine-based) (Flushing)  sulfa drugs (Pruritus; Rash)  Advil (Anaphylaxis)      MEDICATIONS:  STANDING MEDICATIONS  enoxaparin Injectable 40 milliGRAM(s) SubCutaneous every 24 hours  pantoprazole  Injectable 40 milliGRAM(s) IV Push daily  sodium chloride 0.9%. 1000 milliLiter(s) IV Continuous <Continuous>    PRN MEDICATIONS  ondansetron Injectable 4 milliGRAM(s) IV Push every 6 hours PRN    VITALS:   T(F): 98.8  HR: 83  BP: 139/78  RR: 18  SpO2: 98%    LABS:                          13.2   5.50  )-----------( 168      ( 31 Jul 2024 20:00 )             40.5     07-31    128<L>  |  95<L>  |  14  ----------------------------<  121<H>  3.9   |  24  |  0.7    Ca    9.0      31 Jul 2024 20:00  Phos  2.7     07-31  Mg     2.4     07-31        Urinalysis Basic - ( 31 Jul 2024 20:00 )    Color: x / Appearance: x / SG: x / pH: x  Gluc: 121 mg/dL / Ketone: x  / Bili: x / Urobili: x   Blood: x / Protein: x / Nitrite: x   Leuk Esterase: x / RBC: x / WBC x   Sq Epi: x / Non Sq Epi: x / Bacteria: x      Culture - Urine (collected 30 Jul 2024 02:20)  Source: Clean Catch Clean Catch (Midstream)  Final Report (31 Jul 2024 07:17):    <10,000 CFU/mL Normal Urogenital Lucinda      RADIOLOGY:    < from: Xray Chest 1 View- PORTABLE-Urgent (Xray Chest 1 View- PORTABLE-Urgent .) (07.30.24 @ 00:15) >    Impression:    Masslike processin the left retrocardiac region measuring 2.3 cm    Neoplasm/malignancy is not excluded    Postcontrast chest CT is recommended for further evaluation.    --- End of Report ---    ***Please see the addendum at the top of this report. It may contain   additional important information or changes.****    < end of copied text >    < from: CT Abdomen and Pelvis w/ Oral Cont (07.30.24 @ 02:36) >    IMPRESSION:  Mildly nonspecific distended distal small bowel loop in the pelvis   without definite evidence of a small bowel obstruction. The oral contrast   has not yet transverse to the colon. Short-term follow-up radiograph in a   2 hours can be obtained to confirm appropriate transition.        --- End of Report ---    < end of copied text >    < from: Xray Kidney Ureter Bladder (07.30.24 @ 05:01) >    Findings/  impression:    No evidence of small bowel obstruction or pneumoperitoneum. Several   prominent small bowel loops project over the lower abdomen. These measure   up to 2.7 cm. Oral contrast material is noted within. Fecal material is   noted in the colon.    --- End of Report ---    < end of copied text >    < from: Xray Abdomen Minimum 3 Views (07.30.24 @ 12:27) >    Findings/  impression:    There may be a small amount of oral contrast within the left colon.    Again seen are prominent small bowel loops overlying the lower abdomen   measuring up to 3.4 cm.    Large colonic stool load.    Previously noted retrocardiac masslike process within the chest is not   well-seen on this examination. Findings may berelated to differences in   technique.    However, chest CT is recommended based on the prior findings.    Stable cardiomediastinal silhouette.    Stable osseous structures.    --- End of Report ---    < end of copied text >    '< from: Xray Chest 1 View AP/PA (07.30.24 @ 15:47) >  Impression:    Previously noted masslike process in the left retrocardiac area,   paraspinal location, is again seen.    Further evaluation with chest CT is recommended.    --- End of Report ---    < end of copied text >    < from: Xray Abdomen Minimum 3 Views (07.31.24 @ 15:36) >  Findings/  impression:    Distended loops of small bowel with contrast in the differential   air-fluid levels recognize consistent with at least partial small bowel   obstruction.    Enteric catheter extends below the hemidiaphragm.    There is no organomegaly.    Small bilateral effusions.    --- End of Report ---        < end of copied text >  < from: Xray Kidney Ureter Bladder (07.31.24 @ 17:10) >  Findings/  impression: Enteric tube is in place. Persistent small bowel dilation up   to 4.5 cm. Stool seen in the colon. No free air.    --- End of Report ---    < end of copied text >  < from: Xray Kidney Ureter Bladder (07.31.24 @ 23:47) >  Findings/  impression: Enteric tube is stable in the stomach. Stable to minimal   decrease in caliber of the dilated small bowel. Stable stool in the   colon. No significant gas in the rectum.    --- End of Report ---    < end of copied text >    < from: CT Abdomen and Pelvis w/ Oral Cont (08.01.24 @ 14:29) >  IMPRESSION:    New dilated loops of small bowel with a transition point in the right   lower abdomen. Additional loops of dilated bowel in the pelvis with no   intraluminal contrast, may represent an underlying closed loop   obstruction.    Ascending colonic wall thickening may reflect colitis.    New small bilateral pleural effusions. New moderate abdominal pelvic   ascites.    Small pericardial effusion.      --- End of Report ---    ***Please see the addendum at the top of this report. It may contain   additional important information or changes.****    < end of copied text >      PHYSICAL EXAM:  PHYSICAL EXAM:  General: NAD, calm and cooperative  HEENT: NG tube in place, NCAT, EOMI, Trachea ML, Neck supple  Cardiac: RRR  Respiratory: No accessory muscle use, b/l chest rise and fall, normal respiratory effort  Abdomen: Distended, mild diffuse TTP, hypoactive BS x4 quadrants   Musculoskeletal: ROM intact, compartments soft  Vascular: Extremities well perfused  Skin: Warm/dry, normal color, no jaundice      ASSESSMENT AND PLAN:

## 2024-08-06 NOTE — PROGRESS NOTE ADULT - SUBJECTIVE AND OBJECTIVE BOX
GENERAL SURGERY PROGRESS NOTE    Patient: RHONDA WOODRUFF , 74y ([birth year])Female   MRN: [id number]  Location: [place] [address] [address]  Visit: 07-30-24 Inpatient  Date: 08-06-24 @ 02:59    Hospital Day #: 9  Post-Op Day #: 4    Procedure/Dx/Injuries:     s/p Exploratory laparotomy with small bowel resection    Events of past 24 hour:  No acute events overnight. Pt was seen and examen at the bedside [place] pain, N/V, no Bm yet, not passing gas, Pulling about 1000 on IS. Mg was repleted with 2g, K and Phosphorus was repleted with K-Phos 30 Betsy. NG outpt was about 100cc    PAST MEDICAL & SURGICAL HISTORY:  Osteoporosis  Multiple closed and open fractures  Toe fracture, right  Dry eyes  Retinal tear, left  Elbow fracture, left  S/P appendectomy    Vitals:   T(F): 98.2 (08-06-24 @ 00:28), Max: 99.4 (08-05-24 @ 16:00)  HR: 69 (08-06-24 @ 00:28)  BP: 118/74 (08-06-24 @ 00:28)  RR: 18 (08-06-24 @ 00:28)  SpO2: 97% (08-06-24 @ 00:28)      Diet, NPO      Fluids:     I & O's:    08-04-24 @ 07:01  -  08-05-24 @ 07:00  --------------------------------------------------------  IN:    dextrose 5% + lactated ringers: 1200 mL    IV PiggyBack: 250 mL  Total IN: 1450 mL    OUT:    Nasogastric/Oral tube (mL): 300 mL    Stool (mL): 1 mL    Voided (mL): 750 mL  Total OUT: 1051 mL    Total NET: 399 mL    PHYSICAL EXAM:  General: NAD, calm and cooperative  HEENT: NCAT, EOMI, Trachea ML, Neck supple  Cardiac: RRR   Respiratory: B/l chest rise and fall, normal respiratory effort, no accessory muscle use  Abdomen: Previna in midline, soft, non-distended, non-tender  Musculoskeletal: ROM intact, compartments soft  Vascular: Extremities well perfused  Skin: Warm/dry, normal color, no jaundice  Incision/wound: healing well, clean, dry and intact    MEDICATIONS  (STANDING):  dextrose 5% + lactated ringers. 1000 milliLiter(s) (75 mL/Hr) IV Continuous <Continuous>  enoxaparin Injectable 40 milliGRAM(s) SubCutaneous every 24 hours  pantoprazole  Injectable 40 milliGRAM(s) IV Push daily    MEDICATIONS  (PRN):  bisacodyl Suppository 10 milliGRAM(s) Rectal <User Schedule> PRN Constipation  ondansetron Injectable 4 milliGRAM(s) IV Push every 6 hours PRN Nausea    DVT PROPHYLAXIS: enoxaparin Injectable 40 milliGRAM(s) SubCutaneous every 24 hours    GI PROPHYLAXIS: pantoprazole  Injectable 40 milliGRAM(s) IV Push daily    LAB/STUDIES:  Labs:  CAPILLARY BLOOD GLUCOSE                        10.2   5.12  )-----------( 149      ( 05 Aug 2024 22:47 )             31.3       Auto Neutrophil %: 77.1 % (08-05-24 @ 22:47)  Auto Immature Granulocyte %: 0.4 % (08-05-24 @ 22:47)    08-05    138  |  97<L>  |  11  ----------------------------<  110<H>  2.8<L>   |  27  |  0.5<L>      Calcium: 8.2 mg/dL (08-05-24 @ 22:47)    Urinalysis Basic - ( 05 Aug 2024 22:47 )    Color: x / Appearance: x / SG: x / pH: x  Gluc: 110 mg/dL / Ketone: x  / Bili: x / Urobili: x   Blood: x / Protein: x / Nitrite: x   Leuk Esterase: x / RBC: x / WBC x   Sq Epi: x / Non Sq Epi: x / Bacteria: x

## 2024-08-06 NOTE — CONSULT NOTE ADULT - ATTENDING COMMENTS
Replete magnesium and potassium. Follow up labs. If hemodynamics are affected, will consider transfer to SDU or SICU.

## 2024-08-06 NOTE — CONSULT NOTE ADULT - REASON FOR ADMISSION
Abdominal pain, SBO versus diffuse colonic stool burden
Abdominal pain, SBO versus diffuse colonic stool burden

## 2024-08-07 LAB
ANION GAP SERPL CALC-SCNC: 13 MMOL/L — SIGNIFICANT CHANGE UP (ref 7–14)
BASOPHILS # BLD AUTO: 0.01 K/UL — SIGNIFICANT CHANGE UP (ref 0–0.2)
BASOPHILS NFR BLD AUTO: 0.2 % — SIGNIFICANT CHANGE UP (ref 0–1)
BUN SERPL-MCNC: 6 MG/DL — LOW (ref 10–20)
CALCIUM SERPL-MCNC: 8.6 MG/DL — SIGNIFICANT CHANGE UP (ref 8.4–10.5)
CHLORIDE SERPL-SCNC: 95 MMOL/L — LOW (ref 98–110)
CO2 SERPL-SCNC: 25 MMOL/L — SIGNIFICANT CHANGE UP (ref 17–32)
CREAT SERPL-MCNC: 0.6 MG/DL — LOW (ref 0.7–1.5)
EGFR: 94 ML/MIN/1.73M2 — SIGNIFICANT CHANGE UP
EOSINOPHIL # BLD AUTO: 0.17 K/UL — SIGNIFICANT CHANGE UP (ref 0–0.7)
EOSINOPHIL NFR BLD AUTO: 2.9 % — SIGNIFICANT CHANGE UP (ref 0–8)
GLUCOSE SERPL-MCNC: 119 MG/DL — HIGH (ref 70–99)
HCT VFR BLD CALC: 33.6 % — LOW (ref 37–47)
HGB BLD-MCNC: 11 G/DL — LOW (ref 12–16)
IMM GRANULOCYTES NFR BLD AUTO: 0.7 % — HIGH (ref 0.1–0.3)
LYMPHOCYTES # BLD AUTO: 0.77 K/UL — LOW (ref 1.2–3.4)
LYMPHOCYTES # BLD AUTO: 13.3 % — LOW (ref 20.5–51.1)
MAGNESIUM SERPL-MCNC: 1.9 MG/DL — SIGNIFICANT CHANGE UP (ref 1.8–2.4)
MCHC RBC-ENTMCNC: 26.5 PG — LOW (ref 27–31)
MCHC RBC-ENTMCNC: 32.7 G/DL — SIGNIFICANT CHANGE UP (ref 32–37)
MCV RBC AUTO: 81 FL — SIGNIFICANT CHANGE UP (ref 81–99)
MONOCYTES # BLD AUTO: 0.79 K/UL — HIGH (ref 0.1–0.6)
MONOCYTES NFR BLD AUTO: 13.6 % — HIGH (ref 1.7–9.3)
NEUTROPHILS # BLD AUTO: 4.03 K/UL — SIGNIFICANT CHANGE UP (ref 1.4–6.5)
NEUTROPHILS NFR BLD AUTO: 69.3 % — SIGNIFICANT CHANGE UP (ref 42.2–75.2)
NRBC # BLD: 0 /100 WBCS — SIGNIFICANT CHANGE UP (ref 0–0)
PHOSPHATE SERPL-MCNC: 2.8 MG/DL — SIGNIFICANT CHANGE UP (ref 2.1–4.9)
PLATELET # BLD AUTO: 194 K/UL — SIGNIFICANT CHANGE UP (ref 130–400)
PMV BLD: 11.3 FL — HIGH (ref 7.4–10.4)
POTASSIUM SERPL-MCNC: 3.9 MMOL/L — SIGNIFICANT CHANGE UP (ref 3.5–5)
POTASSIUM SERPL-SCNC: 3.9 MMOL/L — SIGNIFICANT CHANGE UP (ref 3.5–5)
RBC # BLD: 4.15 M/UL — LOW (ref 4.2–5.4)
RBC # FLD: 14.3 % — SIGNIFICANT CHANGE UP (ref 11.5–14.5)
SODIUM SERPL-SCNC: 133 MMOL/L — LOW (ref 135–146)
WBC # BLD: 5.81 K/UL — SIGNIFICANT CHANGE UP (ref 4.8–10.8)
WBC # FLD AUTO: 5.81 K/UL — SIGNIFICANT CHANGE UP (ref 4.8–10.8)

## 2024-08-07 PROCEDURE — 99232 SBSQ HOSP IP/OBS MODERATE 35: CPT

## 2024-08-07 PROCEDURE — 99232 SBSQ HOSP IP/OBS MODERATE 35: CPT | Mod: 24,25

## 2024-08-07 RX ORDER — SOD PHOS DI, MONO/K PHOS MONO 250 MG
1 TABLET ORAL ONCE
Refills: 0 | Status: COMPLETED | OUTPATIENT
Start: 2024-08-07 | End: 2024-08-07

## 2024-08-07 RX ADMIN — ENOXAPARIN SODIUM 40 MILLIGRAM(S): 120 INJECTION SUBCUTANEOUS at 23:21

## 2024-08-07 RX ADMIN — Medication 10 MILLIGRAM(S): at 22:55

## 2024-08-07 RX ADMIN — Medication 10 MILLIGRAM(S): at 00:57

## 2024-08-07 RX ADMIN — Medication 1 PACKET(S): at 00:57

## 2024-08-07 RX ADMIN — SODIUM CHLORIDE AND POTASSIUM CHLORIDE 75 MILLILITER(S): 150; 450 INJECTION, SOLUTION INTRAVENOUS at 21:02

## 2024-08-07 RX ADMIN — PANTOPRAZOLE SODIUM 40 MILLIGRAM(S): 20 TABLET, DELAYED RELEASE ORAL at 12:05

## 2024-08-07 NOTE — PROGRESS NOTE ADULT - SUBJECTIVE AND OBJECTIVE BOX
GENERAL SURGERY PROGRESS NOTE     RHONDA WOODRUFF  [address] [place] [address] day :10d    POD:5d  Procedure: Exploratory laparotomy with small bowel resection    Open lysis of intestinal adhesions      Surgical Attending: Francisco Guzman  Overnight events: No acute events overnight. Pt continuing to get K+ repletions with most recent K 3.6. She has reports passing gas and having small bowel movement 8/6am. She reports pain at IV site where K repletions are running, rate adjusted per nursing.     T(F): 98.3 (08-07-24 @ 00:56), Max: 99.4 (08-06-24 @ 16:18)  HR: 64 (08-07-24 @ 00:56) (64 - 76)  BP: 120/78 (08-07-24 @ 00:56) (106/69 - 120/78)  ABP: --  ABP(mean): --  RR: 18 (08-07-24 @ 00:56) (18 - 18)  SpO2: 96% (08-07-24 @ 00:56) (96% - 97%)    IN'S / OUT's:    08-05-24 @ 07:01  -  08-06-24 @ 07:00  --------------------------------------------------------  IN:    dextrose 5% + lactated ringers: 525 mL  Total IN: 525 mL    OUT:    Nasogastric/Oral tube (mL): 50 mL    Voided (mL): 1500 mL  Total OUT: 1550 mL    Total NET: -1025 mL      08-06-24 @ 07:01  -  08-07-24 @ 01:39  --------------------------------------------------------  IN:  Total IN: 0 mL    OUT:    Voided (mL): 800 mL  Total OUT: 800 mL    Total NET: -800 mL          PHYSICAL EXAM:  GENERAL: NAD  CHEST/LUNG: equal chest rise b/l  HEART: Regular rate and rhythm  ABDOMEN: Soft, Nontender, Nondistended; Wound vac in place over midline incision with appropriate suction  EXTREMITIES:  No clubbing, cyanosis, or edema      LABS  Labs:  CAPILLARY BLOOD GLUCOSE                              10.4   4.96  )-----------( 172      ( 06 Aug 2024 20:00 )             31.8       Auto Neutrophil %: 70.8 % (08-06-24 @ 20:00)  Auto Immature Granulocyte %: 0.4 % (08-06-24 @ 20:00)    08-06    134<L>  |  96<L>  |  6<L>  ----------------------------<  93  3.6   |  27  |  0.5<L>      Magnesium: 2.0 mg/dL (08-06-24 @ 20:00)      LFTs:             5.1  | 0.5  | 19       ------------------[64      ( 06 Aug 2024 20:00 )  3.3  | x    | 12          Lipase:x      Amylase:x             Coags:            Urinalysis Basic - ( 06 Aug 2024 20:00 )    Color: x / Appearance: x / SG: x / pH: x  Gluc: 93 mg/dL / Ketone: x  / Bili: x / Urobili: x   Blood: x / Protein: x / Nitrite: x   Leuk Esterase: x / RBC: x / WBC x   Sq Epi: x / Non Sq Epi: x / Bacteria: x            RADIOLOGY & ADDITIONAL TESTS:      A/P:  RHONDA WOODRUFF is a 74y F w/ PMHx of appendectomy, GERD, osteoporosis, left elbow surgery, recent right foot surgery for hammer toe who presents complaining of abdominal pain, nausea, vomiting. Pt not passing BM's or flatus for several days. Imaging consistent with partial SBO.      PLAN:   - monitor wound vac output quality and quantity  - monitor for return of bowel function  - if pt has return of bowel function possibly start CLD   - continue D5 1/2NS W/ 20meq K  - daily labs, replete electrolytes as needed   - continue to monitor K+, replete as needed   - multi modal pain control  - DVT and GI ppx  - continue dulcolax suppository Q8hr          #DVT ppx: enoxaparin Injectable 40 milliGRAM(s) SubCutaneous every 24 hours    #GI ppx: pantoprazole  Injectable 40 milliGRAM(s) IV Push daily    Disposition:  4C    Above plan to be discussed with Attending Surgeon Dr. Maldonado  , patient, patient family, and rest of health care team    TAP (Trauma, Acute care, Pediatrics) Spectra [phone]

## 2024-08-07 NOTE — PROGRESS NOTE ADULT - SUBJECTIVE AND OBJECTIVE BOX
SUBJECTIVE:    Patient is a 74y old Female who presents with a chief complaint of Abdominal pain, SBO versus diffuse colonic stool burden (01 Aug 2024 02:16)    Currently admitted to medicine with the primary diagnosis of Intractable abdominal pain       Patient seen and examined at bedside.   patient reported that she is tolerating clear liquids. no vomiting in last 24 hours         PAST MEDICAL & SURGICAL HISTORY  Osteoporosis    Multiple closed and open fractures    Toe fracture, right    Dry eyes    Retinal tear, left    Elbow fracture, left    S/P appendectomy        SOCIAL HISTORY:  Negative for smoking/alcohol/drug use.     ALLERGIES:  latex (Pruritus)  aspirin (Vomiting)  penicillin (Hives)  contrast media (iodine-based) (Flushing)  sulfa drugs (Pruritus; Rash)  Advil (Anaphylaxis)      MEDICATIONS:    MEDICATIONS  (STANDING):  chlorhexidine 2% Cloths 1 Application(s) Topical daily  dextrose 5% + sodium chloride 0.45% with potassium chloride 20 mEq/L 1000 milliLiter(s) (75 mL/Hr) IV Continuous <Continuous>  enoxaparin Injectable 40 milliGRAM(s) SubCutaneous every 24 hours  pantoprazole  Injectable 40 milliGRAM(s) IV Push daily    MEDICATIONS  (PRN):  bisacodyl Suppository 10 milliGRAM(s) Rectal <User Schedule> PRN Constipation  ondansetron Injectable 4 milliGRAM(s) IV Push every 6 hours PRN Nausea      VITALS:     Vital Signs Last 24 Hrs  Vital Signs Last 24 Hrs  T(C): 37 (07 Aug 2024 16:01), Max: 37 (07 Aug 2024 16:01)  T(F): 98.6 (07 Aug 2024 16:01), Max: 98.6 (07 Aug 2024 16:01)  HR: 84 (07 Aug 2024 16:01) (64 - 84)  BP: 117/84 (07 Aug 2024 16:01) (117/84 - 122/72)  BP(mean): --  RR: 18 (07 Aug 2024 16:01) (18 - 18)  SpO2: 96% (07 Aug 2024 16:01) (96% - 98%)    Parameters below as of 07 Aug 2024 16:01  Patient On (Oxygen Delivery Method): room air            LABS:                 LABS:                        10.4   4.96  )-----------( 172      ( 06 Aug 2024 20:00 )             31.8     08-06    134<L>  |  96<L>  |  6<L>  ----------------------------<  93  3.6   |  27  |  0.5<L>    Ca    8.4      06 Aug 2024 20:00  Phos  2.6     08-06  Mg     2.0     08-06    TPro  5.1<L>  /  Alb  3.3<L>  /  TBili  0.5  /  DBili  x   /  AST  19  /  ALT  12  /  AlkPhos  64  08-06                          Urinalysis Basic - ( 31 Jul 2024 20:00 )    Color: x / Appearance: x / SG: x / pH: x  Gluc: 121 mg/dL / Ketone: x  / Bili: x / Urobili: x   Blood: x / Protein: x / Nitrite: x   Leuk Esterase: x / RBC: x / WBC x   Sq Epi: x / Non Sq Epi: x / Bacteria: x      Culture - Urine (collected 30 Jul 2024 02:20)  Source: Clean Catch Clean Catch (Midstream)  Final Report (31 Jul 2024 07:17):    <10,000 CFU/mL Normal Urogenital Lucinda      RADIOLOGY:      < from: Xray Chest 1 View- PORTABLE-Urgent (Xray Chest 1 View- PORTABLE-Urgent .) (07.30.24 @ 00:15) >    Impression:    Masslike process in the left retrocardiac region measuring 2.3 cm    Neoplasm/malignancy is not excluded    Postcontrast chest CT is recommended for further evaluation.    --- End of Report ---    ***Please see the addendum at the top of this report. It may contain   additional important information or changes.****    < end of copied text >    < from: CT Abdomen and Pelvis w/ Oral Cont (07.30.24 @ 02:36) >    IMPRESSION:  Mildly nonspecific distended distal small bowel loop in the pelvis   without definite evidence of a small bowel obstruction. The oral contrast   has not yet transverse to the colon. Short-term follow-up radiograph in a   2 hours can be obtained to confirm appropriate transition.        --- End of Report ---    < end of copied text >    < from: Xray Kidney Ureter Bladder (07.30.24 @ 05:01) >    Findings/  impression:    No evidence of small bowel obstruction or pneumoperitoneum. Several   prominent small bowel loops project over the lower abdomen. These measure   up to 2.7 cm. Oral contrast material is noted within. Fecal material is   noted in the colon.    --- End of Report ---    < end of copied text >    < from: Xray Abdomen Minimum 3 Views (07.30.24 @ 12:27) >    Findings/  impression:    There may be a small amount of oral contrast within the left colon.    Again seen are prominent small bowel loops overlying the lower abdomen   measuring up to 3.4 cm.    Large colonic stool load.    Previously noted retrocardiac masslike process within the chest is not   well-seen on this examination. Findings may berelated to differences in   technique.    However, chest CT is recommended based on the prior findings.    Stable cardiomediastinal silhouette.    Stable osseous structures.    --- End of Report ---    < end of copied text >    '< from: Xray Chest 1 View AP/PA (07.30.24 @ 15:47) >  Impression:    Previously noted masslike process in the left retrocardiac area,   paraspinal location, is again seen.    Further evaluation with chest CT is recommended.    --- End of Report ---    < end of copied text >    < from: Xray Abdomen Minimum 3 Views (07.31.24 @ 15:36) >  Findings/  impression:    Distended loops of small bowel with contrast in the differential   air-fluid levels recognize consistent with at least partial small bowel   obstruction.    Enteric catheter extends below the hemidiaphragm.    There is no organomegaly.    Small bilateral effusions.    --- End of Report ---        < end of copied text >  < from: Xray Kidney Ureter Bladder (07.31.24 @ 17:10) >  Findings/  impression: Enteric tube is in place. Persistent small bowel dilation up   to 4.5 cm. Stool seen in the colon. No free air.    --- End of Report ---    < end of copied text >  < from: Xray Kidney Ureter Bladder (07.31.24 @ 23:47) >  Findings/  impression: Enteric tube is stable in the stomach. Stable to minimal   decrease in caliber of the dilated small bowel. Stable stool in the   colon. No significant gas in the rectum.    --- End of Report ---    < end of copied text >    < from: CT Abdomen and Pelvis w/ Oral Cont (08.01.24 @ 14:29) >  IMPRESSION:    New dilated loops of small bowel with a transition point in the right   lower abdomen. Additional loops of dilated bowel in the pelvis with no   intraluminal contrast, may represent an underlying closed loop   obstruction.    Ascending colonic wall thickening may reflect colitis.    New small bilateral pleural effusions. New moderate abdominal pelvic   ascites.    Small pericardial effusion.      --- End of Report ---    ***Please see the addendum at the top of this report. It may contain   additional important information or changes.****    < end of copied text >    < from: Xray Kidney Ureter Bladder (08.04.24 @ 08:52) >  INTERPRETATION:  Clinical History / Reason for exam: Abdominal discomfort.    Multiple views of the abdomen. Comparison is made with imaging datedJuly   31, 2024.    Findings/  impression:    The patient has a rectal tube and enteric catheter. Contrast within right   colon. Distended loops of small bowel. No organomegaly.    Findings may be indicative of partial small bowel obstruction versus   ileus.    --- End of Report ---      < end of copied text >          PHYSICAL EXAM:  PHYSICAL EXAM:  General: NAD, calm and cooperative  HEENT: NG tube in place,  Cardiac: RRR,  Respiratory: No accessory muscle use, b/l chest rise and fall, normal respiratory effort  Abdomen: Distended, mild diffuse TTP, hypoactive BS x4 quadrants   Musculoskeletal: ROM intact, compartments soft  Vascular: Extremities well perfused,   Skin: Warm/dry, normal color, no jaundice      ASSESSMENT AND PLAN:

## 2024-08-07 NOTE — PROGRESS NOTE ADULT - ASSESSMENT
74F w/ pmhx of appendectomy, GERD, osteoporosis, left elbow surgery, recent right foot surgery for hammer toe who presents complaining of abdominal pain, nausea, vomiting. Pt not passing BM's or flatus for several days. Imaging consistent with partial SBO. Pt is NPO with NGT to LIS.       Problem list:    #Abdominal pain/Nausea/Vomiting 2/2 partial SBO  #Small B/L pleural effusions (new) and BLE pitting edema   #Abdominal wall thickening possibly 2/2 Colitis   #Moderate abdominal pelvic ascites  #Mass-like L-retrocardiac process  #Hypokalemia (improved)  #Hx of GERD   #Hx of osteoporosis   #Hypophosphatemia (improved)        Plan:  - serial abdominal exams   - stool count and monitor for flatus  - indication for surgical intervention per primary team  - on clear liquids   - Elevate HOB   - aspiration precautions   - daily KUB in the interim   - PRN pain scale   - monitor for fever, trend WBC, and other signs of infection; consider empiric ABx if indicated   - c/w Protonix 40mg IV qd  - monitor I/O's and net balance   -  chest CT to assess CXR (7/30 retrocardiac mass). discussed with surgery resident   - Compression stockings to BLE    follow up: would recommend ct chest (retrocardiac mass? on CXR 7/30). IV fluids,  d/w surgery while rounding on patient 74F w/ pmhx of appendectomy, GERD, osteoporosis, left elbow surgery, recent right foot surgery for hammer toe who presents complaining of abdominal pain, nausea, vomiting. Pt not passing BM's or flatus for several days. Imaging consistent with partial SBO. Pt is NPO with NGT to LIS.       Problem list:    #Abdominal pain/Nausea/Vomiting 2/2 partial SBO  #Small B/L pleural effusions (new) and BLE pitting edema   #Abdominal wall thickening possibly 2/2 Colitis   #Moderate abdominal pelvic ascites  #Mass-like L-retrocardiac process  #Hypokalemia (improved)  #Hx of GERD   #Hx of osteoporosis   #Hypophosphatemia (improved)        Plan:  - serial abdominal exams   - stool count and monitor for flatus  - indication for surgical intervention per primary team  - on clear liquids   - Elevate HOB   - aspiration precautions   - daily KUB in the interim   - PRN pain scale   - monitor for fever, trend WBC, and other signs of infection; consider empiric ABx if indicated   - c/w Protonix 40mg IV qd  - monitor I/O's and net balance   -  chest CT to assess CXR (7/30 retrocardiac mass). discussed with surgery resident   - Compression stockings to BLE    follow up: would recommend ct chest (retrocardiac mass? on CXR 7/30). IV fluids,  d/w surgery while rounding on patient    bharti told me that she does not take any medications except for vitamins

## 2024-08-08 LAB
ANION GAP SERPL CALC-SCNC: 11 MMOL/L — SIGNIFICANT CHANGE UP (ref 7–14)
ANION GAP SERPL CALC-SCNC: 11 MMOL/L — SIGNIFICANT CHANGE UP (ref 7–14)
BUN SERPL-MCNC: 7 MG/DL — LOW (ref 10–20)
BUN SERPL-MCNC: 8 MG/DL — LOW (ref 10–20)
CALCIUM SERPL-MCNC: 8.7 MG/DL — SIGNIFICANT CHANGE UP (ref 8.4–10.5)
CALCIUM SERPL-MCNC: 9 MG/DL — SIGNIFICANT CHANGE UP (ref 8.4–10.5)
CHLORIDE SERPL-SCNC: 98 MMOL/L — SIGNIFICANT CHANGE UP (ref 98–110)
CHLORIDE SERPL-SCNC: 98 MMOL/L — SIGNIFICANT CHANGE UP (ref 98–110)
CO2 SERPL-SCNC: 23 MMOL/L — SIGNIFICANT CHANGE UP (ref 17–32)
CO2 SERPL-SCNC: 25 MMOL/L — SIGNIFICANT CHANGE UP (ref 17–32)
CREAT SERPL-MCNC: 0.5 MG/DL — LOW (ref 0.7–1.5)
CREAT SERPL-MCNC: 0.5 MG/DL — LOW (ref 0.7–1.5)
EGFR: 98 ML/MIN/1.73M2 — SIGNIFICANT CHANGE UP
EGFR: 98 ML/MIN/1.73M2 — SIGNIFICANT CHANGE UP
GLUCOSE SERPL-MCNC: 104 MG/DL — HIGH (ref 70–99)
GLUCOSE SERPL-MCNC: 106 MG/DL — HIGH (ref 70–99)
HCT VFR BLD CALC: 32.2 % — LOW (ref 37–47)
HGB BLD-MCNC: 10.4 G/DL — LOW (ref 12–16)
MAGNESIUM SERPL-MCNC: 1.7 MG/DL — LOW (ref 1.8–2.4)
MCHC RBC-ENTMCNC: 26.2 PG — LOW (ref 27–31)
MCHC RBC-ENTMCNC: 32.3 G/DL — SIGNIFICANT CHANGE UP (ref 32–37)
MCV RBC AUTO: 81.1 FL — SIGNIFICANT CHANGE UP (ref 81–99)
NRBC # BLD: 0 /100 WBCS — SIGNIFICANT CHANGE UP (ref 0–0)
PHOSPHATE SERPL-MCNC: 3 MG/DL — SIGNIFICANT CHANGE UP (ref 2.1–4.9)
PLATELET # BLD AUTO: 202 K/UL — SIGNIFICANT CHANGE UP (ref 130–400)
PMV BLD: 11.1 FL — HIGH (ref 7.4–10.4)
POTASSIUM SERPL-MCNC: 4.4 MMOL/L — SIGNIFICANT CHANGE UP (ref 3.5–5)
POTASSIUM SERPL-MCNC: 5 MMOL/L — SIGNIFICANT CHANGE UP (ref 3.5–5)
POTASSIUM SERPL-SCNC: 4.4 MMOL/L — SIGNIFICANT CHANGE UP (ref 3.5–5)
POTASSIUM SERPL-SCNC: 5 MMOL/L — SIGNIFICANT CHANGE UP (ref 3.5–5)
RBC # BLD: 3.97 M/UL — LOW (ref 4.2–5.4)
RBC # FLD: 14.4 % — SIGNIFICANT CHANGE UP (ref 11.5–14.5)
SODIUM SERPL-SCNC: 132 MMOL/L — LOW (ref 135–146)
SODIUM SERPL-SCNC: 134 MMOL/L — LOW (ref 135–146)
WBC # BLD: 5.29 K/UL — SIGNIFICANT CHANGE UP (ref 4.8–10.8)
WBC # FLD AUTO: 5.29 K/UL — SIGNIFICANT CHANGE UP (ref 4.8–10.8)

## 2024-08-08 PROCEDURE — 99232 SBSQ HOSP IP/OBS MODERATE 35: CPT

## 2024-08-08 PROCEDURE — 99232 SBSQ HOSP IP/OBS MODERATE 35: CPT | Mod: 24

## 2024-08-08 RX ORDER — POTASSIUM CHLORIDE 1500 MG/1
40 TABLET, EXTENDED RELEASE ORAL ONCE
Refills: 0 | Status: COMPLETED | OUTPATIENT
Start: 2024-08-08 | End: 2024-08-08

## 2024-08-08 RX ORDER — SODIUM PHOSPHATE, MONOBASIC, MONOHYDRATE 276; 142 MG/ML; MG/ML
30 INJECTION, SOLUTION INTRAVENOUS ONCE
Refills: 0 | Status: DISCONTINUED | OUTPATIENT
Start: 2024-08-08 | End: 2024-08-08

## 2024-08-08 RX ADMIN — PANTOPRAZOLE SODIUM 40 MILLIGRAM(S): 20 TABLET, DELAYED RELEASE ORAL at 14:08

## 2024-08-08 RX ADMIN — POTASSIUM CHLORIDE 40 MILLIEQUIVALENT(S): 1500 TABLET, EXTENDED RELEASE ORAL at 10:15

## 2024-08-08 RX ADMIN — Medication 10 MILLIGRAM(S): at 10:15

## 2024-08-08 RX ADMIN — ENOXAPARIN SODIUM 40 MILLIGRAM(S): 120 INJECTION SUBCUTANEOUS at 23:09

## 2024-08-08 NOTE — DIETITIAN INITIAL EVALUATION ADULT - ENERGY INTAKE
At admit, pt was NPO for a very long time and now transitioned to a full liquid diet. Discussed nutrition supplements. Pt reports good appetite.

## 2024-08-08 NOTE — PROGRESS NOTE ADULT - SUBJECTIVE AND OBJECTIVE BOX
GENERAL SURGERY PROGRESS NOTE     RHONDA WOODRUFF  [address] [place] [address] day :9d    POD: 6d  Procedure: Exploratory laparotomy with small bowel resection    Open lysis of intestinal adhesions      Surgical Attending: Francisco Guzman  24 hr events: Tolerating CLD diet. Passed flatus. had a BM at 5AM on 8/7. Abdomen is mildy distended but non-tender.    PHYSICAL EXAM:  GENERAL: NAD, well-appearing  CHEST/LUNG: b/l equal chest rise  HEART: Regular rate and rhythm  ABDOMEN: Abdomen is mildy distended but non-tender.  EXTREMITIES:  No clubbing, cyanosis, or edema      T(F): 98 (08-08-24 @ 00:38), Max: 98.6 (08-07-24 @ 16:01)  HR: 69 (08-08-24 @ 00:38) (69 - 84)  BP: 123/86 (08-08-24 @ 00:38) (117/84 - 123/86)  ABP: --  ABP(mean): --  RR: 18 (08-08-24 @ 00:38) (18 - 18)  SpO2: 97% (08-08-24 @ 00:38) (96% - 98%)    IN'S / OUT's:    08-06-24 @ 07:01  -  08-07-24 @ 07:00  --------------------------------------------------------  IN:    dextrose 5% + sodium chloride 0.45% w/ Additives: 450 mL  Total IN: 450 mL    OUT:    Voided (mL): 1200 mL  Total OUT: 1200 mL    Total NET: -750 mL      08-07-24 @ 07:01  -  08-08-24 @ 03:14  --------------------------------------------------------  IN:  Total IN: 0 mL    OUT:    Stool (mL): 1 mL    Voided (mL): 950 mL  Total OUT: 951 mL    Total NET: -951 mL          MEDICATIONS  (STANDING):  chlorhexidine 2% Cloths 1 Application(s) Topical daily  dextrose 5% + sodium chloride 0.45% with potassium chloride 20 mEq/L 1000 milliLiter(s) (75 mL/Hr) IV Continuous <Continuous>  enoxaparin Injectable 40 milliGRAM(s) SubCutaneous every 24 hours  pantoprazole  Injectable 40 milliGRAM(s) IV Push daily    MEDICATIONS  (PRN):  bisacodyl Suppository 10 milliGRAM(s) Rectal <User Schedule> PRN Constipation  ondansetron Injectable 4 milliGRAM(s) IV Push every 6 hours PRN Nausea      LABS  Labs:  CAPILLARY BLOOD GLUCOSE                              11.0   5.81  )-----------( 194      ( 07 Aug 2024 21:10 )             33.6       Auto Neutrophil %: 69.3 % (08-07-24 @ 21:10)  Auto Immature Granulocyte %: 0.7 % (08-07-24 @ 21:10)    08-07    133<L>  |  95<L>  |  6<L>  ----------------------------<  119<H>  3.9   |  25  |  0.6<L>      Calcium: 8.6 mg/dL (08-07-24 @ 21:10)      LFTs:             5.1  | 0.5  | 19       ------------------[64      ( 06 Aug 2024 20:00 )  3.3  | x    | 12          Lipase:x      Amylase:x             Coags:            Urinalysis Basic - ( 07 Aug 2024 21:10 )    Color: x / Appearance: x / SG: x / pH: x  Gluc: 119 mg/dL / Ketone: x  / Bili: x / Urobili: x   Blood: x / Protein: x / Nitrite: x   Leuk Esterase: x / RBC: x / WBC x   Sq Epi: x / Non Sq Epi: x / Bacteria: x            RADIOLOGY & ADDITIONAL TESTS:   GENERAL SURGERY PROGRESS NOTE     RHONDA WOODRUFF  [address] [place] [address] day :9d    POD: 6d  Procedure: Exploratory laparotomy with small bowel resection    Open lysis of intestinal adhesions      Surgical Attending: Dr. Maldonado  24 hr events: Tolerating CLD diet. Passed flatus. had a BM at 5AM on 8/7. Abdomen is mildy distended but non-tender.    PHYSICAL EXAM:  GENERAL: NAD, well-appearing  CHEST/LUNG: b/l equal chest rise  HEART: Regular rate and rhythm  ABDOMEN: Abdomen is mildy distended but non-tender.  EXTREMITIES:  No clubbing, cyanosis, or edema      T(F): 98 (08-08-24 @ 00:38), Max: 98.6 (08-07-24 @ 16:01)  HR: 69 (08-08-24 @ 00:38) (69 - 84)  BP: 123/86 (08-08-24 @ 00:38) (117/84 - 123/86)  ABP: --  ABP(mean): --  RR: 18 (08-08-24 @ 00:38) (18 - 18)  SpO2: 97% (08-08-24 @ 00:38) (96% - 98%)    IN'S / OUT's:    08-06-24 @ 07:01  -  08-07-24 @ 07:00  --------------------------------------------------------  IN:    dextrose 5% + sodium chloride 0.45% w/ Additives: 450 mL  Total IN: 450 mL    OUT:    Voided (mL): 1200 mL  Total OUT: 1200 mL    Total NET: -750 mL      08-07-24 @ 07:01  -  08-08-24 @ 03:14  --------------------------------------------------------  IN:  Total IN: 0 mL    OUT:    Stool (mL): 1 mL    Voided (mL): 950 mL  Total OUT: 951 mL    Total NET: -951 mL          MEDICATIONS  (STANDING):  chlorhexidine 2% Cloths 1 Application(s) Topical daily  dextrose 5% + sodium chloride 0.45% with potassium chloride 20 mEq/L 1000 milliLiter(s) (75 mL/Hr) IV Continuous <Continuous>  enoxaparin Injectable 40 milliGRAM(s) SubCutaneous every 24 hours  pantoprazole  Injectable 40 milliGRAM(s) IV Push daily    MEDICATIONS  (PRN):  bisacodyl Suppository 10 milliGRAM(s) Rectal <User Schedule> PRN Constipation  ondansetron Injectable 4 milliGRAM(s) IV Push every 6 hours PRN Nausea      LABS  Labs:  CAPILLARY BLOOD GLUCOSE                              11.0   5.81  )-----------( 194      ( 07 Aug 2024 21:10 )             33.6       Auto Neutrophil %: 69.3 % (08-07-24 @ 21:10)  Auto Immature Granulocyte %: 0.7 % (08-07-24 @ 21:10)    08-07    133<L>  |  95<L>  |  6<L>  ----------------------------<  119<H>  3.9   |  25  |  0.6<L>      Calcium: 8.6 mg/dL (08-07-24 @ 21:10)      LFTs:             5.1  | 0.5  | 19       ------------------[64      ( 06 Aug 2024 20:00 )  3.3  | x    | 12          Lipase:x      Amylase:x             Coags:            Urinalysis Basic - ( 07 Aug 2024 21:10 )    Color: x / Appearance: x / SG: x / pH: x  Gluc: 119 mg/dL / Ketone: x  / Bili: x / Urobili: x   Blood: x / Protein: x / Nitrite: x   Leuk Esterase: x / RBC: x / WBC x   Sq Epi: x / Non Sq Epi: x / Bacteria: x            RADIOLOGY & ADDITIONAL TESTS:

## 2024-08-08 NOTE — DIETITIAN INITIAL EVALUATION ADULT - OTHER INFO
-- RHONDA WOODRUFF is a 74y F presents complaining of abdominal pain, nausea, vomiting. Pt not passing BM's or flatus for several days. Imaging consistent with partial SBO. Tolerating CLD diet. Passed flatus. had a BM at 5AM on 8/7. Abdomen is mildy distended but non-tender.  - Advance diet as tolerated  - daily labs, replete electrolytes as needed

## 2024-08-08 NOTE — DIETITIAN INITIAL EVALUATION ADULT - WEIGHT IN KG
49 Prednisone Pregnancy And Lactation Text: This medication is Pregnancy Category C and it isn't know if it is safe during pregnancy. This medication is excreted in breast milk.

## 2024-08-08 NOTE — DIETITIAN INITIAL EVALUATION ADULT - ADD RECOMMEND
1. Add Ensure Plus HP daily (350kcal/20g PRO)   2. Add Ensure Clear daily (240kcal/8g PRO)   3. advance diet per sx reccs; Goal diet: Low fiber   4. encourage and assist with PO intake

## 2024-08-08 NOTE — DIETITIAN INITIAL EVALUATION ADULT - OTHER CALCULATIONS
Energy: 1177-1275kcal/day (using MSJ 1.2-1.3AF due to BMI)   Protein: 59-64g/day (using 1.2-1.3g/day)   Fluid: 1mL/kcal/day

## 2024-08-08 NOTE — PROGRESS NOTE ADULT - SUBJECTIVE AND OBJECTIVE BOX
SUBJECTIVE:    Patient is a 74y old Female who presents with a chief complaint of Abdominal pain, SBO versus diffuse colonic stool burden (01 Aug 2024 02:16)    Currently admitted to medicine with the primary diagnosis of Intractable abdominal pain       Patient seen and examined at bedside.   no issues. diet changed to full liquid         PAST MEDICAL & SURGICAL HISTORY  Osteoporosis    Multiple closed and open fractures    Toe fracture, right    Dry eyes    Retinal tear, left    Elbow fracture, left    S/P appendectomy        SOCIAL HISTORY:  Negative for smoking/alcohol/drug use.     ALLERGIES:  latex (Pruritus)  aspirin (Vomiting)  penicillin (Hives)  contrast media (iodine-based) (Flushing)  sulfa drugs (Pruritus; Rash)  Advil (Anaphylaxis)      MEDICATIONS:    MEDICATIONS  (STANDING):  chlorhexidine 2% Cloths 1 Application(s) Topical daily  dextrose 5% + sodium chloride 0.45% with potassium chloride 20 mEq/L 1000 milliLiter(s) (75 mL/Hr) IV Continuous <Continuous>  enoxaparin Injectable 40 milliGRAM(s) SubCutaneous every 24 hours  pantoprazole  Injectable 40 milliGRAM(s) IV Push daily    MEDICATIONS  (PRN):  bisacodyl Suppository 10 milliGRAM(s) Rectal <User Schedule> PRN Constipation  ondansetron Injectable 4 milliGRAM(s) IV Push every 6 hours PRN Nausea      VITALS:     Vital Signs Last 24 Hrs  Vital Signs Last 24 Hrs  T(C): 36.4 (08 Aug 2024 08:52), Max: 37 (07 Aug 2024 16:01)  T(F): 97.6 (08 Aug 2024 08:52), Max: 98.6 (07 Aug 2024 16:01)  HR: 63 (08 Aug 2024 08:52) (63 - 84)  BP: 107/72 (08 Aug 2024 08:52) (107/72 - 123/86)  BP(mean): --  RR: 18 (08 Aug 2024 08:52) (18 - 18)  SpO2: 98% (08 Aug 2024 08:52) (96% - 98%)    Parameters below as of 07 Aug 2024 16:01  Patient On (Oxygen Delivery Method): room air                LABS:                   LABS:                        11.0   5.81  )-----------( 194      ( 07 Aug 2024 21:10 )             33.6     08-07    133<L>  |  95<L>  |  6<L>  ----------------------------<  119<H>  3.9   |  25  |  0.6<L>    Ca    8.6      07 Aug 2024 21:10  Phos  2.8     08-07  Mg     1.9     08-07    TPro  5.1<L>  /  Alb  3.3<L>  /  TBili  0.5  /  DBili  x   /  AST  19  /  ALT  12  /  AlkPhos  64  08-06                                  Urinalysis Basic - ( 31 Jul 2024 20:00 )    Color: x / Appearance: x / SG: x / pH: x  Gluc: 121 mg/dL / Ketone: x  / Bili: x / Urobili: x   Blood: x / Protein: x / Nitrite: x   Leuk Esterase: x / RBC: x / WBC x   Sq Epi: x / Non Sq Epi: x / Bacteria: x      Culture - Urine (collected 30 Jul 2024 02:20)  Source: Clean Catch Clean Catch (Midstream)  Final Report (31 Jul 2024 07:17):    <10,000 CFU/mL Normal Urogenital Lucinda      RADIOLOGY:      < from: Xray Chest 1 View- PORTABLE-Urgent (Xray Chest 1 View- PORTABLE-Urgent .) (07.30.24 @ 00:15) >    Impression:    Masslike process in the left retrocardiac region measuring 2.3 cm    Neoplasm/malignancy is not excluded    Postcontrast chest CT is recommended for further evaluation.    --- End of Report ---    ***Please see the addendum at the top of this report. It may contain   additional important information or changes.****    < end of copied text >    < from: CT Abdomen and Pelvis w/ Oral Cont (07.30.24 @ 02:36) >    IMPRESSION:  Mildly nonspecific distended distal small bowel loop in the pelvis   without definite evidence of a small bowel obstruction. The oral contrast   has not yet transverse to the colon. Short-term follow-up radiograph in a   2 hours can be obtained to confirm appropriate transition.        --- End of Report ---    < end of copied text >    < from: Xray Kidney Ureter Bladder (07.30.24 @ 05:01) >    Findings/  impression:    No evidence of small bowel obstruction or pneumoperitoneum. Several   prominent small bowel loops project over the lower abdomen. These measure   up to 2.7 cm. Oral contrast material is noted within. Fecal material is   noted in the colon.    --- End of Report ---    < end of copied text >    < from: Xray Abdomen Minimum 3 Views (07.30.24 @ 12:27) >    Findings/  impression:    There may be a small amount of oral contrast within the left colon.    Again seen are prominent small bowel loops overlying the lower abdomen   measuring up to 3.4 cm.    Large colonic stool load.    Previously noted retrocardiac masslike process within the chest is not   well-seen on this examination. Findings may berelated to differences in   technique.    However, chest CT is recommended based on the prior findings.    Stable cardiomediastinal silhouette.    Stable osseous structures.    --- End of Report ---    < end of copied text >    '< from: Xray Chest 1 View AP/PA (07.30.24 @ 15:47) >  Impression:    Previously noted masslike process in the left retrocardiac area,   paraspinal location, is again seen.    Further evaluation with chest CT is recommended.    --- End of Report ---    < end of copied text >    < from: Xray Abdomen Minimum 3 Views (07.31.24 @ 15:36) >  Findings/  impression:    Distended loops of small bowel with contrast in the differential   air-fluid levels recognize consistent with at least partial small bowel   obstruction.    Enteric catheter extends below the hemidiaphragm.    There is no organomegaly.    Small bilateral effusions.    --- End of Report ---        < end of copied text >  < from: Xray Kidney Ureter Bladder (07.31.24 @ 17:10) >  Findings/  impression: Enteric tube is in place. Persistent small bowel dilation up   to 4.5 cm. Stool seen in the colon. No free air.    --- End of Report ---    < end of copied text >  < from: Xray Kidney Ureter Bladder (07.31.24 @ 23:47) >  Findings/  impression: Enteric tube is stable in the stomach. Stable to minimal   decrease in caliber of the dilated small bowel. Stable stool in the   colon. No significant gas in the rectum.    --- End of Report ---    < end of copied text >    < from: CT Abdomen and Pelvis w/ Oral Cont (08.01.24 @ 14:29) >  IMPRESSION:    New dilated loops of small bowel with a transition point in the right   lower abdomen. Additional loops of dilated bowel in the pelvis with no   intraluminal contrast, may represent an underlying closed loop   obstruction.    Ascending colonic wall thickening may reflect colitis.    New small bilateral pleural effusions. New moderate abdominal pelvic   ascites.    Small pericardial effusion.      --- End of Report ---    ***Please see the addendum at the top of this report. It may contain   additional important information or changes.****    < end of copied text >    < from: Xray Kidney Ureter Bladder (08.04.24 @ 08:52) >  INTERPRETATION:  Clinical History / Reason for exam: Abdominal discomfort.    Multiple views of the abdomen. Comparison is made with imaging datedJuly   31, 2024.    Findings/  impression:    The patient has a rectal tube and enteric catheter. Contrast within right   colon. Distended loops of small bowel. No organomegaly.    Findings may be indicative of partial small bowel obstruction versus   ileus.    --- End of Report ---      < end of copied text >          PHYSICAL EXAM:  PHYSICAL EXAM:  General: NAD, calm and cooperative  HEENT: NG tube in place,  Cardiac: RRR,  Respiratory: No accessory muscle use, b/l chest rise and fall, normal respiratory effort  Abdomen: Distended, mild diffuse TTP, hypoactive BS x4 quadrants   Musculoskeletal: ROM intact, compartments soft  Vascular: Extremities well perfused,   Skin: Warm/dry, normal color, no jaundice      ASSESSMENT AND PLAN:

## 2024-08-08 NOTE — DIETITIAN INITIAL EVALUATION ADULT - PERTINENT MEDS FT
MEDICATIONS  (STANDING):  chlorhexidine 2% Cloths 1 Application(s) Topical daily  enoxaparin Injectable 40 milliGRAM(s) SubCutaneous every 24 hours  pantoprazole  Injectable 40 milliGRAM(s) IV Push daily    MEDICATIONS  (PRN):  bisacodyl Suppository 10 milliGRAM(s) Rectal <User Schedule> PRN Constipation  ondansetron Injectable 4 milliGRAM(s) IV Push every 6 hours PRN Nausea   MEDICATIONS  (STANDING):  enoxaparin Injectable 40 milliGRAM(s) SubCutaneous every 24 hours  pantoprazole  Injectable 40 milliGRAM(s) IV Push daily    MEDICATIONS  (PRN):  bisacodyl Suppository 10 milliGRAM(s) Rectal <User Schedule> PRN Constipation  ondansetron Injectable 4 milliGRAM(s) IV Push every 6 hours PRN Nausea

## 2024-08-08 NOTE — DIETITIAN INITIAL EVALUATION ADULT - PERTINENT LABORATORY DATA
08-07    133<L>  |  95<L>  |  6<L>  ----------------------------<  119<H>  3.9   |  25  |  0.6<L>    Ca    8.6      07 Aug 2024 21:10  Phos  2.8     08-07  Mg     1.9     08-07    TPro  5.1<L>  /  Alb  3.3<L>  /  TBili  0.5  /  DBili  x   /  AST  19  /  ALT  12  /  AlkPhos  64  08-06

## 2024-08-08 NOTE — PROGRESS NOTE ADULT - ASSESSMENT
RHONDA WOODRUFF is a 74y F w/ PMHx of appendectomy, GERD, osteoporosis, left elbow surgery, recent right foot surgery for hammer toe who presents complaining of abdominal pain, nausea, vomiting. Pt not passing BM's or flatus for several days. Imaging consistent with partial SBO. Tolerating CLD diet. Passed flatus. had a BM at 5AM on 8/7. Abdomen is mildy distended but non-tender.      PLAN:   - monitor for return of bowel function  - Advance diet as tolerated  - daily labs, replete electrolytes as needed   - continue to monitor K+, replete as needed   - multi modal pain control  - DVT and GI ppx  - continue dulcolax suppository Q8hr

## 2024-08-08 NOTE — PROGRESS NOTE ADULT - ASSESSMENT
74F w/ pmhx of appendectomy, GERD, osteoporosis, left elbow surgery, recent right foot surgery for hammer toe who presents complaining of abdominal pain, nausea, vomiting. Pt not passing BM's or flatus for several days. Imaging consistent with partial SBO. Pt is NPO with NGT to LIS.       Problem list:    #Abdominal pain/Nausea/Vomiting 2/2 partial SBO, improved   #Small B/L pleural effusions (new) and BLE pitting edema   #Abdominal wall thickening possibly 2/2 Colitis   #Moderate abdominal pelvic ascites  #Mass-like L-retrocardiac process  #Hypokalemia (improved)  #Hx of GERD   #Hx of osteoporosis   #Hypophosphatemia (improved)        Plan:  - serial abdominal exams   - stool count and monitor for flatus  - indication for surgical intervention per primary team  - switched to full liquid diet  - Elevate HOB   - aspiration precautions   - daily KUB in the interim   - PRN pain scale   - monitor for fever, trend WBC, and other signs of infection; consider empiric ABx if indicated   - c/w Protonix 40mg IV qd  - monitor I/O's and net balance   -  chest CT to assess CXR (7/30 retrocardiac mass). discussed with surgery resident   - Compression stockings to BLE    follow up: would recommend ct chest (retrocardiac mass? on CXR 7/30). IV fluids,  patient told me that she does not take any medications except for vitamins  advance diet as tolerated. plan as per surgery

## 2024-08-08 NOTE — DIETITIAN INITIAL EVALUATION ADULT - ORAL INTAKE PTA/DIET HISTORY
PTA reports good PO intake and a good appetite. Pt says she eats frequent small meals throughout the day. says she is a very healthy eater normally, east organic, low fat foods and avoids fast/junk food. Pt says she is also very active and this is just her lifestyle. She says she has always been thin and fit and has not lost any wt unintentionally. All the abdominal pain and GI s/s started in the hospital. UBW: 48.6kg 2 weeks ago vs. wt at admit: 49kg -- no loss noted. Allergic to fish, mushrooms, aged cheese, cantaloupe, honeydew, papaya and high fat milk. Takes Vitamin C, calcium carbonate with D.

## 2024-08-08 NOTE — DIETITIAN INITIAL EVALUATION ADULT - NSFNSGIIOFT_GEN_A_CORE
08-07-24 @ 07:01  -  08-08-24 @ 07:00  --------------------------------------------------------  OUT:    Stool (mL): 2 mL  Total OUT: 2 mL    Total NET: -2 mL       IBW: 54.5kg     08-07-24 @ 07:01  -  08-08-24 @ 07:00  --------------------------------------------------------  OUT:    Stool (mL): 2 mL  Total OUT: 2 mL    Total NET: -2 mL

## 2024-08-09 ENCOUNTER — TRANSCRIPTION ENCOUNTER (OUTPATIENT)
Age: 74
End: 2024-08-09

## 2024-08-09 VITALS
DIASTOLIC BLOOD PRESSURE: 63 MMHG | RESPIRATION RATE: 18 BRPM | SYSTOLIC BLOOD PRESSURE: 101 MMHG | TEMPERATURE: 99 F | OXYGEN SATURATION: 98 % | HEART RATE: 75 BPM

## 2024-08-09 PROCEDURE — 99238 HOSP IP/OBS DSCHRG MGMT 30/<: CPT | Mod: 24,25

## 2024-08-09 RX ORDER — MAGNESIUM SULFATE 500 MG/ML
2 VIAL (ML) INJECTION ONCE
Refills: 0 | Status: DISCONTINUED | OUTPATIENT
Start: 2024-08-09 | End: 2024-08-09

## 2024-08-09 RX ORDER — MAGNESIUM SULFATE 500 MG/ML
4 VIAL (ML) INJECTION ONCE
Refills: 0 | Status: DISCONTINUED | OUTPATIENT
Start: 2024-08-09 | End: 2024-08-09

## 2024-08-09 RX ORDER — LORATADINE 10 MG
17 TABLET,DISINTEGRATING ORAL
Qty: 1 | Refills: 0
Start: 2024-08-09 | End: 2024-08-22

## 2024-08-09 RX ADMIN — PANTOPRAZOLE SODIUM 40 MILLIGRAM(S): 20 TABLET, DELAYED RELEASE ORAL at 12:15

## 2024-08-09 NOTE — PROGRESS NOTE ADULT - SUBJECTIVE AND OBJECTIVE BOX
Procedure: s/p exploratory laparotomy small bowel resection and lysis of adhesions  POD# 7    PAST MEDICAL & SURGICAL HISTORY:  Osteoporosis      Multiple closed and open fractures      Toe fracture, right      Dry eyes      Retinal tear, left      Elbow fracture, left      S/P appendectomy      24H EVENTS pt reports + flatus and + BM and tolerated po diet     Vital Signs Last 24 Hrs  T(C): 37.1 (09 Aug 2024 08:17), Max: 37.2 (09 Aug 2024 00:28)  T(F): 98.8 (09 Aug 2024 08:17), Max: 99 (09 Aug 2024 00:28)  HR: 75 (09 Aug 2024 08:17) (70 - 97)  BP: 101/63 (09 Aug 2024 08:17) (100/60 - 115/78)  BP(mean): --  RR: 18 (09 Aug 2024 08:17) (18 - 19)  SpO2: 98% (09 Aug 2024 08:17) (97% - 100%)    Parameters below as of 08 Aug 2024 21:30  Patient On (Oxygen Delivery Method): room air      I&O's Detail      Urinalysis Basic - ( 08 Aug 2024 21:07 )    Color: x / Appearance: x / SG: x / pH: x  Gluc: 104 mg/dL / Ketone: x  / Bili: x / Urobili: x   Blood: x / Protein: x / Nitrite: x   Leuk Esterase: x / RBC: x / WBC x   Sq Epi: x / Non Sq Epi: x / Bacteria: x                   10.4   5.29  )-----------( 202      (  @ 21:07 )             32.2                11.0   5.81  )-----------( 194      (  @ 21:10 )             33.6                    132   |  98    |  8                  Ca: 8.7    BMP:   ----------------------------< 104    M.7   (24 @ 21:07)             4.4    |  23    | 0.5                Ph: 3.0      LFT:     TPro: 5.1 / Alb: 3.3 / TBili: 0.5 / DBili: x / AST: 19 / ALT: 12 / AlkPhos: 64   (24 @ 20:00)        Urinalysis Basic - ( 08 Aug 2024 21:07 )    Color: x / Appearance: x / SG: x / pH: x  Gluc: 104 mg/dL / Ketone: x  / Bili: x / Urobili: x   Blood: x / Protein: x / Nitrite: x   Leuk Esterase: x / RBC: x / WBC x   Sq Epi: x / Non Sq Epi: x / Bacteria: x      MEDICATIONS  (STANDING):  chlorhexidine 2% Cloths 1 Application(s) Topical daily  enoxaparin Injectable 40 milliGRAM(s) SubCutaneous every 24 hours  magnesium sulfate  IVPB 2 Gram(s) IV Intermittent once  pantoprazole  Injectable 40 milliGRAM(s) IV Push daily    MEDICATIONS  (PRN):  bisacodyl Suppository 10 milliGRAM(s) Rectal <User Schedule> PRN Constipation  ondansetron Injectable 4 milliGRAM(s) IV Push every 6 hours PRN Nausea    PHYSICAL EXAM:  General Appearance: pt in NAD  Chest: + air entry bilat  CV: S1, S2  Abdomen: Soft, +BS  NT, ND  prevena vac in place   no rebound tenderness no guarding  Extremities: + ROM  Neuro: A&Ox3

## 2024-08-09 NOTE — PROGRESS NOTE ADULT - PROVIDER SPECIALTY LIST ADULT
Hospitalist
Surgery
Hospitalist
Surgery

## 2024-08-09 NOTE — PROGRESS NOTE ADULT - ASSESSMENT
A/P 73y/o female  s/p exploratory laparotomy small bowel resection and lysis of adhesions POD# 7 for SBO   pt doing well. tolerating  soft diet, + flatus + BM     Continue current management   encourage incentive spirometry and ambulation  possible discharge home today  continue monitoring

## 2024-08-09 NOTE — DISCHARGE NOTE NURSING/CASE MANAGEMENT/SOCIAL WORK - NSDCPEFALRISK_GEN_ALL_CORE
For information on Fall & Injury Prevention, visit: [email or web address] OR  [email or web address] OR  https://www.cdc.gov/steadi/patient.html

## 2024-08-09 NOTE — PROGRESS NOTE ADULT - ATTENDING COMMENTS
ACS Attending  Note Attestation    Patient is examined and evaluated at the bedside with the residents/PAs. Treatment plan discussed with the team, nurses, and consulting physicians and consulting teams. Medications, radiological studies and all other relevant studies reviewed.     RHONDA WOODRUFF Patient is a 74y old  Female who presents with a chief complaint of Abdominal pain, due to partial SBO, continues to have gas in the colon with large stool burden    Diagnosis: partial SBO due to adhesion                  large stool burden    Plan:	  - supportive care  - GI/DVT prophylaxis  - NG tube to LIWS  - pain management  - keep NPO  - incentive spirometer    - follow up consults  - IV Fluids  - repeat studies as needed  - replace electrolytes  - case management evaluation     Prabha Powell MD, FACS  Trauma/ACS/Surgical Critical Care Attending
POD 2 exlap, sbr for sbo  pt denies bowel ftn, no flatus yet, no real bm with suppository  wants to walk more but frustrated by needing to walk with assistance due to IV pole and provena  pain is controlled  ngtr in place with clear/gastric output  abd soft, vac intact    continue ngt until bowel ftn, encouraged continued ambulation
ACS Attending  Note Attestation    Patient is examined and evaluated at the bedside with the residents/PAs. Treatment plan discussed with the team, nurses, and consulting physicians and consulting teams. Medications, radiological studies and all other relevant studies reviewed.     RHONDA WOODRUFF Patient is a 74y old  Female who presents with a chief complaint of Abdominal pain, due to SBO, S/P exploratory laparotomy, lysis of single band adhesion, small bowel resection with primary anastomosis.    Vital Signs Last 24 Hrs  T(C): 36.3 (03 Aug 2024 21:50), Max: 36.9 (03 Aug 2024 07:27)  T(F): 97.4 (03 Aug 2024 21:50), Max: 98.4 (03 Aug 2024 07:27)  HR: 60 (03 Aug 2024 21:50) (60 - 82)  BP: 115/69 (03 Aug 2024 21:50) (105/63 - 115/69)  BP(mean): --  RR: 18 (03 Aug 2024 21:50) (18 - 18)  SpO2: 98% (03 Aug 2024 21:50) (96% - 99%)    Parameters below as of 03 Aug 2024 15:00  Patient On (Oxygen Delivery Method): room air                        10.8   6.56  )-----------( 137      ( 03 Aug 2024 20:14 )             32.8   08-03    136  |  98  |  20  ----------------------------<  97  3.3<L>   |  25  |  0.6<L>    Ca    8.3<L>      03 Aug 2024 20:14  Phos  2.4     08-03  Mg     2.0     08-03    Diagnosis: SBO due to adhesion                 S/P exploratory laparotomy, EUNICE small bowel resection    Plan:	  - supportive care  - GI/DVT prophylaxis  - NPO  - IV Fluids  - NG tube to LIWS  - pain management  - keep NPO  - incentive spirometer    - follow up consults  - IV Fluids  - repeat studies as needed  - replace electrolytes  - case management evaluation     Prabha Powell MD, FACS  Trauma/ACS/Surgical Critical Care Attending
ACS Attending  Note Attestation    Patient is examined and evaluated at the bedside with the residents/PAs. Treatment plan discussed with the team, nurses, and consulting physicians and consulting teams. Medications, radiological studies and all other relevant studies reviewed.     RHONDA WOODRUFF Patient is a 74y old  Female who presents with a chief complaint of Abdominal pain, due to partial SBO, continues to have gas in the colon with large stool burden    Vital Signs Last 24 Hrs  T(C): 36.6 (02 Aug 2024 21:45), Max: 37.1 (02 Aug 2024 19:10)  T(F): 97.8 (02 Aug 2024 21:45), Max: 98.8 (02 Aug 2024 19:10)  HR: 78 (02 Aug 2024 22:15) (68 - 95)  BP: 144/67 (02 Aug 2024 22:15) (116/68 - 155/70)  BP(mean): --  RR: 18 (02 Aug 2024 22:15) (18 - 18)  SpO2: 98% (02 Aug 2024 22:15) (97% - 100%)    Parameters below as of 02 Aug 2024 22:15  Patient On (Oxygen Delivery Method): nasal cannula  O2 Flow (L/min): 2                        13.3   7.09  )-----------( 158      ( 01 Aug 2024 22:32 )             39.7   08-01    133<L>  |  95<L>  |  18  ----------------------------<  103<H>  3.4<L>   |  21  |  0.7    Ca    8.7      01 Aug 2024 22:32  Phos  3.1     08-01  Mg     2.2     08-01    Diagnosis: partial SBO due to adhesion                  large stool burden    Plan:	  - supportive care  - GI/DVT prophylaxis  - plan for OR today  - NG tube to LIWS  - pain management  - keep NPO  - incentive spirometer    - follow up consults  - IV Fluids  - repeat studies as needed  - replace electrolytes  - case management evaluation     Prabha Powell MD, FACS  Trauma/ACS/Surgical Critical Care Attending
ACS Attending  Note Attestation    Patient is examined and evaluated at the bedside with the residents/PAs. Treatment plan discussed with the team, nurses, and consulting physicians and consulting teams. Medications, radiological studies and all other relevant studies reviewed.     RHONDA WOODRUFF Patient is a 74y old  Female who presents with a chief complaint of Abdominal pain, due to partial SBO, continues to have gas in the colon with large stool burden    Vital Signs Last 24 Hrs  T(C): 37.1 (01 Aug 2024 15:47), Max: 37.1 (01 Aug 2024 15:47)  T(F): 98.8 (01 Aug 2024 15:47), Max: 98.8 (01 Aug 2024 15:47)  HR: 83 (01 Aug 2024 15:47) (61 - 83)  BP: 139/78 (01 Aug 2024 15:47) (126/79 - 139/78)  BP(mean): --  RR: 18 (01 Aug 2024 15:47) (18 - 18)  SpO2: 98% (01 Aug 2024 15:47) (96% - 98%)    Parameters below as of 01 Aug 2024 15:47  Patient On (Oxygen Delivery Method): room air                      13.2   5.50  )-----------( 168      ( 31 Jul 2024 20:00 )             40.5   07-31    128<L>  |  95<L>  |  14  ----------------------------<  121<H>  3.9   |  24  |  0.7    Ca    9.0      31 Jul 2024 20:00  Phos  2.7     07-31  Mg     2.4     07-31    Diagnosis: partial SBO due to adhesion                  large stool burden    Plan:	  - supportive care  - GI/DVT prophylaxis  - CT abdomen/pelvis with PO contrast   - NG tube to LIWS  - pain management  - keep NPO  - incentive spirometer    - follow up consults  - IV Fluids  - repeat studies as needed  - replace electrolytes  - case management evaluation     Prabha Powell MD, FACS  Trauma/ACS/Surgical Critical Care Attending
POD 5.  Passing flatus and had BMs.  NGT was removed yesterday.  K was replaced, today  K 3.6    ASSESSMENT:  73 y/o female with SBO.  S/P Ex. Lap and EUNICE, SB resection with anastomosis.  Acute postoperative pain.  Atelectasis.  Hypokalemia.    PLAN:  - start clears  - encourage incentive spirometer  - DVT proph  - follow serum electrolytes and UOP  - PT
POD 3.  Patient had small bowel  movement, no gas.   ml    PE:  AAO x3  Chest: slightly decreased breath sounds in bilateral lung bases  CV : RRR  Abdomen: soft, mildly distended.    ASSESSMENT:  75 y/o female with SBO.  S/P Ex. Lap and EUNICE, SB resection with anastomosis.  Acute postoperative pain.  Atelectasis.    PLAN:  - NPO, IVF, NGT to suction  - needs OOB walking  - encourage incentive spirometer  - follow serum electrolytes and UOP  - DVT proph  - PT
POD 4.  Patient had small bowel movement.  No gas.  NGT 50 ml/24h.  her K is 2.7 this am.    ASSESSMENT:  73 y/o female with SBO.  S/P Ex. Lap and EUNICE, SB resection with anastomosis.  Acute postoperative pain.  Atelectasis.  Hypokalemia.    PLAN:  - NGT removed  - keep NPO   - needs OOB walking  - encourage incentive spirometer  - follow serum electrolytes and UOP  - replace potassium; follow K early today after replacement  - DVT proph
POD 6.    Patient tolerates clears.  Has gas and BMs.  K 3.5 today.  Has slightly decreased breath sounds in bilateral lung bases.    ASSESSMENT:  75 y/o female with SBO.  S/P Ex. Lap and EUNICE, SB resection with anastomosis.  Acute postoperative pain.  Atelectasis.  Hypokalemia.    PLAN:  - full liquid diet today  - incentive spirometer  - iv lock  - PT for OOB  - DVT proph
POD 7.  patient tolerates soft diet.  Has gas and BM.    Abdomen: soft    Assessment/Plan:  - encourage incentive spirometer  - soft diet  - DVT prophylaxis  - SS for discharge planning

## 2024-08-09 NOTE — DISCHARGE NOTE PROVIDER - CARE PROVIDER_API CALL
Prabha Powell  Surgical Critical Care  [address] [place] [address], [address] [place] [address]  Phone: [phone]  Fax: [phone]  Follow Up Time:

## 2024-08-09 NOTE — DISCHARGE NOTE NURSING/CASE MANAGEMENT/SOCIAL WORK - PATIENT PORTAL LINK FT
You can access the FollowMyHealth Patient Portal offered by [facility] by registering at the following website: [email or web address]. By joining [facility]’s FollowMyHealth portal, you will also be able to view your health information using other applications (apps) compatible with our system.

## 2024-08-09 NOTE — DISCHARGE NOTE PROVIDER - NSDCCPCAREPLAN_GEN_ALL_CORE_FT
PRINCIPAL DISCHARGE DIAGNOSIS  Diagnosis: Intractable abdominal pain  Assessment and Plan of Treatment:       SECONDARY DISCHARGE DIAGNOSES  Diagnosis: Bowel obstruction  Assessment and Plan of Treatment: SURGERY DISCHARGE INSTRUCTIONS  FOLLOW-UP - with Dr. Powell in 2 weeks. Call the office to make an appointment or if you have any questions/concerns.  DIET - low-fiber, regular.   ACTIVITY- No heavy lifting for 4-6 wks over 10-20 lbs. Walking is encouraged. No running or swimming. No driving while taking pain medication.  WOUNDCARE - Some drainage from your incisions or drain sites is normal. If you have drainage from an open incision or drain site- cover loosely with sterile gauze and tape and change daily. If you have clear outer plastic dressing with gauze- remove 3 days after surgery and leave little white bandage strips underneath it on for 7 days. If you have no bandages but have purple glue over your incisions instead, this will come off with time. May shower 24 hours after surgery but no submerging wound under water for 2 weeks (tub bathing). Pat area dry when wet, keep clean and dry. Do not apply powders or lotion to wound area.   OTHER MEDS - If you have any questions about your other regular home medications please call your primary care physician or the physician who prescribed those medications to you.   If you develop fever, dizziness, chest pain, trouble breathing, nausea, vomiting, increasing abdominal pain, inability to pass bowel movements, redness/pain/discharge from incisions. Please call the office or go to the emergency room immediately.

## 2024-08-09 NOTE — PROGRESS NOTE ADULT - REASON FOR ADMISSION
Abdominal pain, SBO versus diffuse colonic stool burden

## 2024-08-09 NOTE — DISCHARGE NOTE PROVIDER - HOSPITAL COURSE
Patient is a 74F w/ pmhx of appendectomy, GERD, osteoporosis, left elbow surgery, recent right foot surgery for hammer toe who presented on 7/29 complaining of abdominal pain, nausea, vomiting that began yesterday, but acutely worsened that day. The patient had "several" episodes of nausea and vomiting that started out as food contents, and transitioned to stomach acid. She endorses bowel movements yesterday and today, no diarrhea, both normal in consistency. She denies fevers, chest pain shortness of breath, or palpitations at this time.  CT A/P showed:   Mildly nonspecific distended distal small bowel loop in the pelvis without definite evidence of a small bowel obstruction. The oral contrast has not yet transverse to the colon. Short-term follow-up radiograph in a 2 hours can be obtained to confirm appropriate transition. General Surgery team consulted for possible small bowel obstruction     She was subsequently admitted to the General Surgery service under Dr. Guzman for management of small bowel obstruction, she was made NPO and put on IV fluids and pain medication. She continued to have several episodes of emesis that initially resolved with Zofran. However these episodes continued and an NG tube was placed on 8/1, set to suction.     After several days without bowel function, the decision was made to take the patient to surgery. On 8/2 she was taken for an exploratory laparotomy with small bowel resection. Findings included lower abdomen incision, exploratory laparotomy, abdominal ascites, dilated loops of small bowel with abrupt transition approximately 20cm from TI due to large adhesive band causing a closed loop obstruction/internal hernia. once band was lysed, a stricture of the small bowel was noted and necessitated a resection. Approximately 30cm resected and creation of side to side antiperistaltic anastomosis using 80 JESICA and 90 TA stapler, and closure of mesenteric small bowel resection.     The patients NGT remained in place postoperatively. On Patient is a 74F w/ pmhx of appendectomy, GERD, osteoporosis, left elbow surgery, recent right foot surgery for hammer toe who presented on 7/29 complaining of abdominal pain, nausea, vomiting that began yesterday, but acutely worsened that day. The patient had "several" episodes of nausea and vomiting that started out as food contents, and transitioned to stomach acid. She endorses bowel movements yesterday and today, no diarrhea, both normal in consistency. She denies fevers, chest pain shortness of breath, or palpitations at this time.  CT A/P showed:   Mildly nonspecific distended distal small bowel loop in the pelvis without definite evidence of a small bowel obstruction. The oral contrast has not yet transverse to the colon. Short-term follow-up radiograph in a 2 hours can be obtained to confirm appropriate transition. General Surgery team consulted for possible small bowel obstruction     She was subsequently admitted to the General Surgery service under Dr. Guzman for management of small bowel obstruction, she was made NPO and put on IV fluids and pain medication. She continued to have several episodes of emesis that initially resolved with Zofran. However these episodes continued and an NG tube was placed on 8/1, set to suction.     After several days without bowel function, the decision was made to take the patient to surgery. On 8/2 she was taken for an exploratory laparotomy with small bowel resection. Findings included lower abdomen incision, exploratory laparotomy, abdominal ascites, dilated loops of small bowel with abrupt transition approximately 20cm from TI due to large adhesive band causing a closed loop obstruction/internal hernia. once band was lysed, a stricture of the small bowel was noted and necessitated a resection. Approximately 30cm resected and creation of side to side antiperistaltic anastomosis using 80 JESICA and 90 TA stapler, and closure of mesenteric small bowel resection.     The patients NGT remained in place postoperatively. On 8/6, the patient's potassium dropped to 2.7, she received several repletions, EKG was done showing sinus rhythm with occasional Premature ventricular complexes, nonspecific T wave abnormality. Potassium levels increased throughout the rest of her hospital stay. On 8/6 she reported having bowel movements and the NG tube was removed. Her diet was advanced to clear liquids and full liquids as tolerated. On 8/8 she was tolerating regular diet and was deemed medically fit for discharge on 8/9.

## 2024-08-09 NOTE — PROGRESS NOTE ADULT - ATTENDING SUPERVISION STATEMENT
Resident
Resident/Fellow
Resident
Resident/Fellow
Resident/Fellow
Resident
Resident/Fellow
Resident/Fellow

## 2024-08-12 ENCOUNTER — EMERGENCY (EMERGENCY)
Facility: HOSPITAL | Age: 74
LOS: 0 days | Discharge: HOME | End: 2024-08-12
Attending: EMERGENCY MEDICINE
Payer: MEDICARE

## 2024-08-12 VITALS
TEMPERATURE: 98 F | OXYGEN SATURATION: 96 % | HEART RATE: 96 BPM | RESPIRATION RATE: 18 BRPM | HEIGHT: 64 IN | WEIGHT: 106.04 LBS | SYSTOLIC BLOOD PRESSURE: 126 MMHG | DIASTOLIC BLOOD PRESSURE: 82 MMHG

## 2024-08-12 DIAGNOSIS — Z88.6 ALLERGY STATUS TO ANALGESIC AGENT: ICD-10-CM

## 2024-08-12 DIAGNOSIS — Z91.040 LATEX ALLERGY STATUS: ICD-10-CM

## 2024-08-12 DIAGNOSIS — Z91.018 ALLERGY TO OTHER FOODS: ICD-10-CM

## 2024-08-12 DIAGNOSIS — Z91.041 RADIOGRAPHIC DYE ALLERGY STATUS: ICD-10-CM

## 2024-08-12 DIAGNOSIS — M81.0 AGE-RELATED OSTEOPOROSIS WITHOUT CURRENT PATHOLOGICAL FRACTURE: ICD-10-CM

## 2024-08-12 DIAGNOSIS — Z88.2 ALLERGY STATUS TO SULFONAMIDES: ICD-10-CM

## 2024-08-12 DIAGNOSIS — H10.029 OTHER MUCOPURULENT CONJUNCTIVITIS, UNSPECIFIED EYE: ICD-10-CM

## 2024-08-12 DIAGNOSIS — M79.89 OTHER SPECIFIED SOFT TISSUE DISORDERS: ICD-10-CM

## 2024-08-12 DIAGNOSIS — Z88.0 ALLERGY STATUS TO PENICILLIN: ICD-10-CM

## 2024-08-12 LAB
ALBUMIN SERPL ELPH-MCNC: 3.7 G/DL — SIGNIFICANT CHANGE UP (ref 3.5–5.2)
ALP SERPL-CCNC: 72 U/L — SIGNIFICANT CHANGE UP (ref 30–115)
ALT FLD-CCNC: 26 U/L — SIGNIFICANT CHANGE UP (ref 0–41)
ANION GAP SERPL CALC-SCNC: 8 MMOL/L — SIGNIFICANT CHANGE UP (ref 7–14)
AST SERPL-CCNC: 24 U/L — SIGNIFICANT CHANGE UP (ref 0–41)
BASOPHILS # BLD AUTO: 0.01 K/UL — SIGNIFICANT CHANGE UP (ref 0–0.2)
BASOPHILS NFR BLD AUTO: 0.2 % — SIGNIFICANT CHANGE UP (ref 0–1)
BILIRUB SERPL-MCNC: 0.2 MG/DL — SIGNIFICANT CHANGE UP (ref 0.2–1.2)
BUN SERPL-MCNC: 14 MG/DL — SIGNIFICANT CHANGE UP (ref 10–20)
CALCIUM SERPL-MCNC: 9.6 MG/DL — SIGNIFICANT CHANGE UP (ref 8.4–10.4)
CHLORIDE SERPL-SCNC: 98 MMOL/L — SIGNIFICANT CHANGE UP (ref 98–110)
CO2 SERPL-SCNC: 27 MMOL/L — SIGNIFICANT CHANGE UP (ref 17–32)
CREAT SERPL-MCNC: 0.6 MG/DL — LOW (ref 0.7–1.5)
EGFR: 94 ML/MIN/1.73M2 — SIGNIFICANT CHANGE UP
EOSINOPHIL # BLD AUTO: 0.08 K/UL — SIGNIFICANT CHANGE UP (ref 0–0.7)
EOSINOPHIL NFR BLD AUTO: 1.6 % — SIGNIFICANT CHANGE UP (ref 0–8)
GLUCOSE SERPL-MCNC: 131 MG/DL — HIGH (ref 70–99)
HCT VFR BLD CALC: 29.4 % — LOW (ref 37–47)
HGB BLD-MCNC: 9.7 G/DL — LOW (ref 12–16)
IMM GRANULOCYTES NFR BLD AUTO: 0.2 % — SIGNIFICANT CHANGE UP (ref 0.1–0.3)
LYMPHOCYTES # BLD AUTO: 0.69 K/UL — LOW (ref 1.2–3.4)
LYMPHOCYTES # BLD AUTO: 13.9 % — LOW (ref 20.5–51.1)
MCHC RBC-ENTMCNC: 27 PG — SIGNIFICANT CHANGE UP (ref 27–31)
MCHC RBC-ENTMCNC: 33 G/DL — SIGNIFICANT CHANGE UP (ref 32–37)
MCV RBC AUTO: 81.9 FL — SIGNIFICANT CHANGE UP (ref 81–99)
MONOCYTES # BLD AUTO: 0.55 K/UL — SIGNIFICANT CHANGE UP (ref 0.1–0.6)
MONOCYTES NFR BLD AUTO: 11.1 % — HIGH (ref 1.7–9.3)
NEUTROPHILS # BLD AUTO: 3.63 K/UL — SIGNIFICANT CHANGE UP (ref 1.4–6.5)
NEUTROPHILS NFR BLD AUTO: 73 % — SIGNIFICANT CHANGE UP (ref 42.2–75.2)
NRBC # BLD: 0 /100 WBCS — SIGNIFICANT CHANGE UP (ref 0–0)
PLATELET # BLD AUTO: 224 K/UL — SIGNIFICANT CHANGE UP (ref 130–400)
PMV BLD: 11.1 FL — HIGH (ref 7.4–10.4)
POTASSIUM SERPL-MCNC: 4.3 MMOL/L — SIGNIFICANT CHANGE UP (ref 3.5–5)
POTASSIUM SERPL-SCNC: 4.3 MMOL/L — SIGNIFICANT CHANGE UP (ref 3.5–5)
PROT SERPL-MCNC: 5.8 G/DL — LOW (ref 6–8)
RBC # BLD: 3.59 M/UL — LOW (ref 4.2–5.4)
RBC # FLD: 14.9 % — HIGH (ref 11.5–14.5)
SODIUM SERPL-SCNC: 133 MMOL/L — LOW (ref 135–146)
WBC # BLD: 4.97 K/UL — SIGNIFICANT CHANGE UP (ref 4.8–10.8)
WBC # FLD AUTO: 4.97 K/UL — SIGNIFICANT CHANGE UP (ref 4.8–10.8)

## 2024-08-12 PROCEDURE — 99284 EMERGENCY DEPT VISIT MOD MDM: CPT | Mod: FS

## 2024-08-12 PROCEDURE — 93970 EXTREMITY STUDY: CPT

## 2024-08-12 PROCEDURE — 36000 PLACE NEEDLE IN VEIN: CPT

## 2024-08-12 PROCEDURE — 93970 EXTREMITY STUDY: CPT | Mod: 26

## 2024-08-12 PROCEDURE — 36415 COLL VENOUS BLD VENIPUNCTURE: CPT

## 2024-08-12 PROCEDURE — 85025 COMPLETE CBC W/AUTO DIFF WBC: CPT

## 2024-08-12 PROCEDURE — 80053 COMPREHEN METABOLIC PANEL: CPT

## 2024-08-12 PROCEDURE — 99284 EMERGENCY DEPT VISIT MOD MDM: CPT | Mod: 25

## 2024-08-12 NOTE — ED ADULT NURSE NOTE - CAS EDN DISCHARGE INTERVENTIONS
There are no preventive care reminders to display for this patient.    Patient is due for topics as listed above but is not proceeding with Immunization(s) Shingles at this time.                    Clinic hours for [facility]:  Monday 7 am - 5 pm  Tuesday 7 am - 5 pm  Wednesday 7 am - 5 pm  Thursday 7 am - 5 pm  Friday 7 am - 4 pm      If you need a refill on your prescription please call your pharmacy and let them know. Please be proactive and call before your medication runs out. The pharmacy will then contact us for the refill. Please allow 24-48 hours for the refill to be processed.     If your physician has ordered additional laboratory or radiology testing as part of your ongoing plan of care, please allow 7-10 business days from the day of your lab draw or test for the results to be sent and reviewed by your provider. If your results are critical and require more immediate intervention, you will be contacted sooner. Your results will be conveyed to you via a phone call or letter.    You may be receiving a patient satisfaction survey in the mail. Please take the time to complete, as your feedback is very important to us. We strive to make your experience exceptional and your comments help us with that goal. We look forward to hearing from you.       IV discontinued, cath removed intact

## 2024-08-12 NOTE — ED PROVIDER NOTE - NS ED ATTENDING STATEMENT MOD
Fasting blood work at [address] current medication  Restart and increase Amlodipine to 10mg 1 tab daily   appt with Dr Valencia Falling for contracture right palm
This was a shared visit with the ACIHA. I reviewed and verified the documentation.

## 2024-08-12 NOTE — ED PROVIDER NOTE - PROGRESS NOTE DETAILS
FF: pt reports her lower leg swelling has improved over the past 48 hours. pt advised to keep her legs elevated above the level of her hear. pt advised to use compression socks. pt has an appt with her pcp. pt advised of strict return precautions discussed at bedside. agreeable to dc. f/u with vascular.

## 2024-08-12 NOTE — ED PROVIDER NOTE - PATIENT PORTAL LINK FT
You can access the FollowMyHealth Patient Portal offered by [facility] by registering at the following website: [email or web address]. By joining [facility]’s FollowMyHealth portal, you will also be able to view your health information using other applications (apps) compatible with our system.

## 2024-08-12 NOTE — ED ADULT NURSE NOTE - NSFALLHARMRISKINTERV_ED_ALL_ED
Communicate risk of Fall with Harm to all staff, patient, and family/Provide visual cue: red socks, yellow wristband, yellow gown, etc/Reinforce activity limits and safety measures with patient and family/Bed in lowest position, wheels locked, appropriate side rails in place/Call bell, personal items and telephone in reach/Instruct patient to call for assistance before getting out of bed/chair/stretcher/[place] to call system/Physically safe environment - no spills, clutter or unnecessary equipment/Purposeful Proactive Rounding/Room/bathroom lighting operational, light cord in reach

## 2024-08-12 NOTE — CHART NOTE - NSCHARTNOTEFT_GEN_A_CORE
Patient evaluated at bedside, AAOX3, NAD. On exam: abdomen is soft, non tender, non distended, midline wound w staples in place w minimal erythema, no signs of infection. b/l extremities with erythema and pitting edema. Patient pending work up for lower extremity edema. Surgery called since patient was unsure about follow up post operative due to her recent surgery in 8/2/2024. Discussed with patient and son at bedside to call office for appointment this week on Friday with Dr Powell.

## 2024-08-12 NOTE — ED PROVIDER NOTE - CARE PROVIDER_API CALL
Triston Reyes  Vascular Surgery  [address] [place] [address], [address]  [place] [address]  Phone: [phone]  Fax: [phone]  Follow Up Time: 7-10 Days

## 2024-08-12 NOTE — ED ADULT TRIAGE NOTE - PATIENT'S PREFERRED PRONOUN
Her/She Niacinamide Pregnancy And Lactation Text: These medications are considered safe during pregnancy.

## 2024-08-12 NOTE — ED PROVIDER NOTE - OBJECTIVE STATEMENT
74-year-old female with a past medical history of osteoporosis, form of keratosis of the lower legs followed by dermatologist, and ex lap small bowel resection status post SBO on August 2 performed by Dr. Powell presents to the ED for evaluation of bilateral lower extremity swelling x 2 days.  Patient reports before during and after surgery she was taking blood thinners but is no longer taking them as advised by her doctor.  Patient reports after she had surgery she was walking around in the hospital.  Patient reports since she has been home she has been walking around a lot and also has been drinking a lot of fluids because while she was in the hospital she felt dehydrated and that her urine was very dark.  Patient reports her visiting nurse, that her lower legs were swollen and advised that she visit the ED.  Patient reports he has been trying to elevate her legs but cannot do it for long periods of time because it aggravates her hemorrhoids.  Patient denies fever, chills, chest pain, shortness of breath, history of blood clots, recent travel, recent trauma, history of cancer, use of hormones, weakness, numbness, tingling, or cigarette smoking.

## 2024-08-12 NOTE — ED PROVIDER NOTE - CLINICAL SUMMARY MEDICAL DECISION MAKING FREE TEXT BOX
74-year-old female past medical history of varicose veins and poruskeratosis, presents with 3 days of bilateral lower extremity edema.  Patient had recent admission to hospital for SBO that required surgery on August 2.  Patient states since she has been home she has had lower extremity edema.  Has been drinking a lot of fluids.  Also trying to walk a lot.  Seen by visiting nurse who urged her to come to the ED today.  No chest pain shortness of breath dyspnea on exertion.  Has been trying to elevate her legs but hurts her hemorrhoids.  No fever cough nausea vomiting diarrhea abdominal pain numbness weakness blurry vision.  Patient states leg swelling has improved recently.  Patient has been trying to make appointment for postop but states her surgeons on vacation and does not know when to follow-up.      On exam, AFVSS, Well appearing, No acute distress, NCAT, EOMI, PERRLA, MMM, Neck supple, LCTAB, RRR nl s1s2 No mrg, Abdomen Soft well-healed surgical wounds, staples in place, NTND, AAOx3, No Focal Deficits, 1+ pitting bilateral LE edema, patient has chronic skin changes secondary to her pores keratosis, no erythema warmth induration tenderness,, no calf TTP, 2+ DP pulses bilateral lower extremity    A/P; lower extremity edema, ED workup unremarkable, Doppler prelim negative for DVT per tech, labs at baseline, recommend leg elevation, compression socks, has appointment with her PCP this week, strict return precautions provided, son at bedside, will also give vascular follow-up

## 2024-08-12 NOTE — ED PROVIDER NOTE - PHYSICAL EXAMINATION
Physical Exam    Vital Signs: I have reviewed the initial vital signs.  Constitutional: well-nourished, appears stated age, no acute distress  Eyes: Conjunctiva pink, Sclera clear  Cardiovascular: regular rate, regular rhythm, well-perfused extremities, radial pulses equal and 2+ b/l.   Respiratory: unlabored respiratory effort, clear to auscultation bilaterally no wheezing, rales and rhonchi. pt is speaking full sentences. no accessory muscle use.   Gastrointestinal: soft, non-tender, nondistended abdomen, no pulsatile mass, no rebound, no guarding, (+) vertical surgical wound to the middle lower abdomen that has staples still intact and is healing well without erythema, warmth, edema or drainage.   Musculoskeletal: supple neck, b/l equal lower extremity edema, no calf tenderness  Integumentary: warm, dry, pt has chronic keratosis on the lower extremity that appear red pt reports it is chronic and has not changed.   Neurologic: awake, alert  Psychiatric: appropriate mood, appropriate affect

## 2024-08-13 ENCOUNTER — NON-APPOINTMENT (OUTPATIENT)
Age: 74
End: 2024-08-13

## 2024-08-16 ENCOUNTER — APPOINTMENT (OUTPATIENT)
Dept: SURGERY | Facility: CLINIC | Age: 74
End: 2024-08-16

## 2024-08-16 VITALS
HEIGHT: 64 IN | DIASTOLIC BLOOD PRESSURE: 68 MMHG | HEART RATE: 63 BPM | WEIGHT: 102 LBS | BODY MASS INDEX: 17.42 KG/M2 | SYSTOLIC BLOOD PRESSURE: 106 MMHG | OXYGEN SATURATION: 98 % | TEMPERATURE: 97 F

## 2024-08-16 PROCEDURE — 99024 POSTOP FOLLOW-UP VISIT: CPT

## 2024-08-20 NOTE — CDI QUERY NOTE - NSCDIOTHERTXTBX_GEN_ALL_CORE_HH
DOCUMENTATION CLARIFICATION FORM   Encounter #: [id number]                                     Patient’s Name: RHONDA WOODRUFF  Medical Record #: [id number]                                  Admit Date: 2024  : 1950                                                      Discharged: 2024  CDI Specialist/: Michael                                  Contact #: [phone]    Dear Dr. Powell,                      Date: 2024    Clinical documentation and/or evidence of the patient’s presentation, evaluation, and medical management, as evidenced below, may support a diagnosis that is not documented in the medical record.  In order to ensure accurate coding and accuracy of the clinical record, the documentation in this patient’s medical record requires additional clarification.      If you think the supporting documentation and/or clinical evidence supports a more specific diagnosis, please include more specific documentation of a diagnosis associated with these findings in your progress note and/or discharge summary.    Please clarify if early ischemic changes with marked congestion and focal fresh hemorrhage at the stricture which necessitated resection can be further specified as:    •  Small bowel stricture was resected due to early acute ischemia and focal fresh hemorrhage as seen on pathology   •  Other (please specify) _______    Supporting documentation and/or clinical evidence:   •  8-2OP Report... presented to emergency room 5 days ago with abdominal pain, nausea and vomiting for 2 days…treated nonoperatively with NPO and NG tube. However, did not resolve small bowel obstruction… The abdomen was explored and there was significant to moderate amount of abdominopelvic ascites… bowel was run from ligament of Treitz to the terminal ileum and any adhesions were sharply lysed…. In the right lower quadrant, there was noted to be an adhesive bands causing a closed loop small bowel obstruction and internal hernia. This band was lysed and a small bowel stricture was noted, which necessitated resection. There was a distal aspect of the small bowel which appeared congested due to the hernia, so the region of the stricture was identified and the extent of the resection was determined to achieve an adequate margin    Pathology  • 8-2 Final Diagnosis: Small bowel, resection:-  Segment of small bowel with early ischemic changes, fibrous adhesions, and with marked congestion and focal fresh hemorrhage at the stricture site. -  Bowel wall at the surgical margins and the separate small fragment of small bowel wall appear viable.

## 2024-08-23 ENCOUNTER — APPOINTMENT (OUTPATIENT)
Dept: SURGERY | Facility: CLINIC | Age: 74
End: 2024-08-23

## 2024-08-23 VITALS
SYSTOLIC BLOOD PRESSURE: 102 MMHG | HEIGHT: 64 IN | WEIGHT: 101 LBS | BODY MASS INDEX: 17.24 KG/M2 | TEMPERATURE: 97 F | DIASTOLIC BLOOD PRESSURE: 70 MMHG | HEART RATE: 59 BPM

## 2024-08-23 PROCEDURE — 99024 POSTOP FOLLOW-UP VISIT: CPT

## 2024-09-06 PROBLEM — K56.52 INTESTINAL ADHESIONS WITH COMPLETE OBSTRUCTION: Status: ACTIVE | Noted: 2024-09-06

## 2024-09-06 PROBLEM — K55.9 ISCHEMIA, BOWEL: Status: ACTIVE | Noted: 2024-09-06

## 2024-09-11 ENCOUNTER — APPOINTMENT (OUTPATIENT)
Dept: VASCULAR SURGERY | Facility: CLINIC | Age: 74
End: 2024-09-11
Payer: MEDICARE

## 2024-09-11 VITALS — DIASTOLIC BLOOD PRESSURE: 67 MMHG | SYSTOLIC BLOOD PRESSURE: 108 MMHG | HEART RATE: 79 BPM

## 2024-09-11 VITALS — WEIGHT: 101 LBS | HEIGHT: 64 IN | BODY MASS INDEX: 17.24 KG/M2

## 2024-09-11 DIAGNOSIS — M79.89 OTHER SPECIFIED SOFT TISSUE DISORDERS: ICD-10-CM

## 2024-09-11 DIAGNOSIS — I83.90 ASYMPTOMATIC VARICOSE VEINS OF UNSPECIFIED LOWER EXTREMITY: ICD-10-CM

## 2024-09-11 PROCEDURE — 99203 OFFICE O/P NEW LOW 30 MIN: CPT

## 2024-09-13 NOTE — PHYSICAL EXAM
[2+] : left 2+ [Ankle Swelling (On Exam)] : present [Ankle Swelling Bilaterally] : bilaterally  [Varicose Veins Of Lower Extremities] : present [Varicose Veins Of The Left Leg] : of the left leg [Ankle Swelling On The Left] : moderate [] : not present

## 2024-09-13 NOTE — HISTORY OF PRESENT ILLNESS
[FreeTextEntry1] : The patient is a 74-year-old female who is status post bowel obstruction secondary to adhesions.  The patient underwent an explore laparotomy laparotomy and lysis of adhesions.  The patient presents today because postoperative she developed lower extremity swelling.  The patient had a venous duplex performed as an outpatient that showed no evidence of DVT.  The patient states her leg swelling is overall improved since making her appointment.  The patient has a history of varicose veins.

## 2024-09-13 NOTE — ASSESSMENT
[FreeTextEntry1] : The patient is a 74-year-old female who presents with postoperative leg swelling which has now resolved.  The patient had an outpatient venous duplex study which was negative for DVT.  The patient has bilateral lower extremity varicosities left worse than right lower extremity.  I have discussed the possibility of a micro stab phlebectomy for her lower extremity varicosities however at this time she would like to continue with conservative management with compression stocking therapy as needed. No vascular surgery intervention warranted at this time.  Follow-up as needed.   I, Dr. Reyes, personally performed the evaluation and management (E/M) services for this established patient who presents today with (a) new problem(s)/exacerbation of (an) existing condition(s). That E/M includes conducting the clinically appropriate interval history &/or exam, assessing all new/exacerbated conditions, and establishing a new plan of care. Today, my AICHA, Bailey], was here to observe my evaluation and management service for this new problem/exacerbated condition and follow the plan of care established by me going forward.  Thank you for allowing me to participate in the care of your patient.   Sincerely,   Triston Reyes MD, RPVI, FACS Associate Professor of Surgery , Vascular Fellowship Director of Limb Salvage Surgery [facility] [employer] [facility] at [facility]

## 2024-09-13 NOTE — ASSESSMENT
[FreeTextEntry1] : The patient is a 74-year-old female who presents with postoperative leg swelling which has now resolved.  The patient had an outpatient venous duplex study which was negative for DVT.  The patient has bilateral lower extremity varicosities left worse than right lower extremity.  I have discussed the possibility of a micro stab phlebectomy for her lower extremity varicosities however at this time she would like to continue with conservative management with compression stocking therapy as needed. No vascular surgery intervention warranted at this time.  Follow-up as needed.   I, Dr. Reyes, personally performed the evaluation and management (E/M) services for this established patient who presents today with (a) new problem(s)/exacerbation of (an) existing condition(s). That E/M includes conducting the clinically appropriate interval history &/or exam, assessing all new/exacerbated conditions, and establishing a new plan of care. Today, my AICHA, Bailey], was here to observe my evaluation and management service for this new problem/exacerbated condition and follow the plan of care established by me going forward.  Thank you for allowing me to participate in the care of your patient.   Sincerely,   Triston Reyes MD, RPVI, FACS Associate Professor of Surgery , Vascular Fellowship Director of Limb Salvage Surgery [facility] [employer] [facility] at [facility]

## 2024-09-20 ENCOUNTER — APPOINTMENT (OUTPATIENT)
Dept: SURGERY | Facility: CLINIC | Age: 74
End: 2024-09-20

## 2024-09-20 VITALS
BODY MASS INDEX: 17.75 KG/M2 | SYSTOLIC BLOOD PRESSURE: 122 MMHG | HEIGHT: 64 IN | WEIGHT: 104 LBS | HEART RATE: 68 BPM | DIASTOLIC BLOOD PRESSURE: 80 MMHG | TEMPERATURE: 97 F | OXYGEN SATURATION: 98 %

## 2024-12-20 ENCOUNTER — APPOINTMENT (OUTPATIENT)
Dept: VASCULAR SURGERY | Facility: CLINIC | Age: 74
End: 2024-12-20

## 2024-12-20 VITALS — HEIGHT: 64 IN | WEIGHT: 105 LBS | BODY MASS INDEX: 17.93 KG/M2

## 2024-12-20 DIAGNOSIS — I83.892 VARICOSE VEINS OF LEFT LOWER EXTREMITY WITH OTHER COMPLICATIONS: ICD-10-CM

## 2024-12-20 PROCEDURE — 99212 OFFICE O/P EST SF 10 MIN: CPT

## 2025-02-12 ENCOUNTER — APPOINTMENT (OUTPATIENT)
Dept: PEDIATRIC ALLERGY IMMUNOLOGY | Facility: CLINIC | Age: 75
End: 2025-02-12
Payer: MEDICARE

## 2025-02-12 VITALS
SYSTOLIC BLOOD PRESSURE: 100 MMHG | WEIGHT: 108 LBS | BODY MASS INDEX: 18.44 KG/M2 | HEIGHT: 64 IN | DIASTOLIC BLOOD PRESSURE: 60 MMHG

## 2025-02-12 DIAGNOSIS — H10.13 ACUTE ATOPIC CONJUNCTIVITIS, BILATERAL: ICD-10-CM

## 2025-02-12 DIAGNOSIS — J30.2 OTHER ALLERGIC RHINITIS: ICD-10-CM

## 2025-02-12 DIAGNOSIS — T78.1XXA OTHER ADVERSE FOOD REACTIONS, NOT ELSEWHERE CLASSIFIED, INITIAL ENCOUNTER: ICD-10-CM

## 2025-02-12 DIAGNOSIS — J30.89 OTHER ALLERGIC RHINITIS: ICD-10-CM

## 2025-02-12 DIAGNOSIS — T50.905A ADVERSE EFFECT OF UNSPECIFIED DRUGS, MEDICAMENTS AND BIOLOGICAL SUBSTANCES, INITIAL ENCOUNTER: ICD-10-CM

## 2025-02-12 PROCEDURE — 99213 OFFICE O/P EST LOW 20 MIN: CPT

## 2025-02-12 RX ORDER — IPRATROPIUM BROMIDE 42 UG/1
0.06 SPRAY, METERED NASAL 3 TIMES DAILY
Qty: 1 | Refills: 2 | Status: ACTIVE | COMMUNITY
Start: 2025-02-12 | End: 1900-01-01

## 2025-03-11 ENCOUNTER — APPOINTMENT (OUTPATIENT)
Dept: ORTHOPEDIC SURGERY | Facility: CLINIC | Age: 75
End: 2025-03-11
Payer: MEDICARE

## 2025-03-11 VITALS — BODY MASS INDEX: 18.1 KG/M2 | WEIGHT: 106 LBS | HEIGHT: 64 IN

## 2025-03-11 DIAGNOSIS — S83.8X2A SPRAIN OF OTHER SPECIFIED PARTS OF LEFT KNEE, INITIAL ENCOUNTER: ICD-10-CM

## 2025-03-11 DIAGNOSIS — S76.012A STRAIN OF MUSCLE, FASCIA AND TENDON OF LEFT HIP, INITIAL ENCOUNTER: ICD-10-CM

## 2025-03-11 PROCEDURE — 99213 OFFICE O/P EST LOW 20 MIN: CPT

## 2025-03-11 PROCEDURE — 73562 X-RAY EXAM OF KNEE 3: CPT | Mod: LT

## 2025-03-11 PROCEDURE — 73502 X-RAY EXAM HIP UNI 2-3 VIEWS: CPT

## 2025-03-15 DIAGNOSIS — M89.9 DISORDER OF BONE, UNSPECIFIED: ICD-10-CM

## 2025-04-02 ENCOUNTER — APPOINTMENT (OUTPATIENT)
Dept: ORTHOPEDIC SURGERY | Facility: CLINIC | Age: 75
End: 2025-04-02
Payer: MEDICARE

## 2025-04-02 ENCOUNTER — NON-APPOINTMENT (OUTPATIENT)
Age: 75
End: 2025-04-02

## 2025-04-02 DIAGNOSIS — S86.911D STRAIN OF UNSPECIFIED MUSCLE(S) AND TENDON(S) AT LOWER LEG LEVEL, RIGHT LEG, SUBSEQUENT ENCOUNTER: ICD-10-CM

## 2025-04-02 DIAGNOSIS — S83.272D COMPLEX TEAR OF LATERAL MENISCUS, CURRENT INJURY, LEFT KNEE, SUBSEQUENT ENCOUNTER: ICD-10-CM

## 2025-04-02 PROCEDURE — 99204 OFFICE O/P NEW MOD 45 MIN: CPT

## 2025-04-03 ENCOUNTER — APPOINTMENT (OUTPATIENT)
Facility: CLINIC | Age: 75
End: 2025-04-03

## 2025-04-03 ENCOUNTER — NON-APPOINTMENT (OUTPATIENT)
Age: 75
End: 2025-04-03

## 2025-04-03 PROCEDURE — 99214 OFFICE O/P EST MOD 30 MIN: CPT

## 2025-04-03 PROCEDURE — 92250 FUNDUS PHOTOGRAPHY W/I&R: CPT

## 2025-04-03 PROCEDURE — 76512 OPH US DX B-SCAN: CPT | Mod: LT

## 2025-05-08 ENCOUNTER — NON-APPOINTMENT (OUTPATIENT)
Age: 75
End: 2025-05-08

## 2025-05-08 ENCOUNTER — APPOINTMENT (OUTPATIENT)
Dept: GASTROENTEROLOGY | Facility: CLINIC | Age: 75
End: 2025-05-08
Payer: MEDICARE

## 2025-05-08 VITALS — WEIGHT: 105 LBS | BODY MASS INDEX: 17.93 KG/M2 | HEIGHT: 64 IN

## 2025-05-08 DIAGNOSIS — Z80.0 FAMILY HISTORY OF MALIGNANT NEOPLASM OF DIGESTIVE ORGANS: ICD-10-CM

## 2025-05-08 DIAGNOSIS — Z78.9 OTHER SPECIFIED HEALTH STATUS: ICD-10-CM

## 2025-05-08 DIAGNOSIS — Z12.11 ENCOUNTER FOR SCREENING FOR MALIGNANT NEOPLASM OF COLON: ICD-10-CM

## 2025-05-08 DIAGNOSIS — K59.00 CONSTIPATION, UNSPECIFIED: ICD-10-CM

## 2025-05-08 DIAGNOSIS — R14.0 ABDOMINAL DISTENSION (GASEOUS): ICD-10-CM

## 2025-05-08 DIAGNOSIS — Z87.39 PERSONAL HISTORY OF OTHER DISEASES OF THE MUSCULOSKELETAL SYSTEM AND CONNECTIVE TISSUE: ICD-10-CM

## 2025-05-08 PROCEDURE — 99204 OFFICE O/P NEW MOD 45 MIN: CPT

## 2025-05-08 RX ORDER — SODIUM SULFATE, POTASSIUM SULFATE AND MAGNESIUM SULFATE 1.6; 3.13; 17.5 G/177ML; G/177ML; G/177ML
17.5-3.13-1.6 SOLUTION ORAL
Qty: 1 | Refills: 0 | Status: ACTIVE | COMMUNITY
Start: 2025-05-08 | End: 1900-01-01

## 2025-05-08 RX ORDER — CHROMIUM 200 MCG
TABLET ORAL
Refills: 0 | Status: ACTIVE | COMMUNITY

## 2025-05-08 RX ORDER — ASCORBIC ACID 500 MG
TABLET ORAL
Refills: 0 | Status: ACTIVE | COMMUNITY

## 2025-05-19 ENCOUNTER — APPOINTMENT (OUTPATIENT)
Dept: ORTHOPEDIC SURGERY | Facility: CLINIC | Age: 75
End: 2025-05-19

## 2025-05-23 ENCOUNTER — APPOINTMENT (OUTPATIENT)
Dept: VASCULAR SURGERY | Facility: CLINIC | Age: 75
End: 2025-05-23
Payer: MEDICARE

## 2025-05-23 VITALS — DIASTOLIC BLOOD PRESSURE: 67 MMHG | SYSTOLIC BLOOD PRESSURE: 136 MMHG | HEART RATE: 68 BPM

## 2025-05-23 DIAGNOSIS — I83.892 VARICOSE VEINS OF LEFT LOWER EXTREMITY WITH OTHER COMPLICATIONS: ICD-10-CM

## 2025-05-23 PROCEDURE — 93971 EXTREMITY STUDY: CPT

## 2025-05-23 PROCEDURE — 99214 OFFICE O/P EST MOD 30 MIN: CPT

## 2025-05-29 ENCOUNTER — APPOINTMENT (OUTPATIENT)
Dept: ORTHOPEDIC SURGERY | Facility: CLINIC | Age: 75
End: 2025-05-29

## 2025-06-13 ENCOUNTER — APPOINTMENT (OUTPATIENT)
Dept: ORTHOPEDIC SURGERY | Facility: CLINIC | Age: 75
End: 2025-06-13
Payer: MEDICARE

## 2025-06-13 ENCOUNTER — NON-APPOINTMENT (OUTPATIENT)
Age: 75
End: 2025-06-13

## 2025-06-13 PROCEDURE — 99213 OFFICE O/P EST LOW 20 MIN: CPT

## 2025-08-11 ENCOUNTER — OUTPATIENT (OUTPATIENT)
Dept: OUTPATIENT SERVICES | Facility: HOSPITAL | Age: 75
LOS: 1 days | End: 2025-08-11
Payer: MEDICARE

## 2025-08-11 VITALS
DIASTOLIC BLOOD PRESSURE: 79 MMHG | HEART RATE: 67 BPM | WEIGHT: 110.89 LBS | RESPIRATION RATE: 16 BRPM | HEIGHT: 64 IN | SYSTOLIC BLOOD PRESSURE: 125 MMHG | TEMPERATURE: 98 F | OXYGEN SATURATION: 100 %

## 2025-08-11 DIAGNOSIS — Z01.818 ENCOUNTER FOR OTHER PREPROCEDURAL EXAMINATION: ICD-10-CM

## 2025-08-11 DIAGNOSIS — Z98.49 CATARACT EXTRACTION STATUS, UNSPECIFIED EYE: Chronic | ICD-10-CM

## 2025-08-11 DIAGNOSIS — I83.892 VARICOSE VEINS OF LEFT LOWER EXTREMITY WITH OTHER COMPLICATIONS: ICD-10-CM

## 2025-08-11 DIAGNOSIS — S42.402A UNSPECIFIED FRACTURE OF LOWER END OF LEFT HUMERUS, INITIAL ENCOUNTER FOR CLOSED FRACTURE: Chronic | ICD-10-CM

## 2025-08-11 DIAGNOSIS — Z98.890 OTHER SPECIFIED POSTPROCEDURAL STATES: Chronic | ICD-10-CM

## 2025-08-11 LAB
ALBUMIN SERPL ELPH-MCNC: 4.5 G/DL — SIGNIFICANT CHANGE UP (ref 3.5–5.2)
ALP SERPL-CCNC: 141 U/L — HIGH (ref 30–115)
ALT FLD-CCNC: 19 U/L — SIGNIFICANT CHANGE UP (ref 0–41)
ANION GAP SERPL CALC-SCNC: 9 MMOL/L — SIGNIFICANT CHANGE UP (ref 7–14)
APTT BLD: 30.2 SEC — SIGNIFICANT CHANGE UP (ref 27–39.2)
AST SERPL-CCNC: 23 U/L — SIGNIFICANT CHANGE UP (ref 0–41)
BASOPHILS # BLD AUTO: 0.01 K/UL — SIGNIFICANT CHANGE UP (ref 0–0.2)
BASOPHILS NFR BLD AUTO: 0.2 % — SIGNIFICANT CHANGE UP (ref 0–1)
BILIRUB SERPL-MCNC: 0.4 MG/DL — SIGNIFICANT CHANGE UP (ref 0.2–1.2)
BUN SERPL-MCNC: 20 MG/DL — SIGNIFICANT CHANGE UP (ref 10–20)
CALCIUM SERPL-MCNC: 9.7 MG/DL — SIGNIFICANT CHANGE UP (ref 8.4–10.5)
CHLORIDE SERPL-SCNC: 97 MMOL/L — LOW (ref 98–110)
CO2 SERPL-SCNC: 26 MMOL/L — SIGNIFICANT CHANGE UP (ref 17–32)
CREAT SERPL-MCNC: 0.7 MG/DL — SIGNIFICANT CHANGE UP (ref 0.7–1.5)
EGFR: 90 ML/MIN/1.73M2 — SIGNIFICANT CHANGE UP
EGFR: 90 ML/MIN/1.73M2 — SIGNIFICANT CHANGE UP
EOSINOPHIL # BLD AUTO: 0.05 K/UL — SIGNIFICANT CHANGE UP (ref 0–0.7)
EOSINOPHIL NFR BLD AUTO: 1.1 % — SIGNIFICANT CHANGE UP (ref 0–8)
GLUCOSE SERPL-MCNC: 82 MG/DL — SIGNIFICANT CHANGE UP (ref 70–99)
HCT VFR BLD CALC: 34.7 % — LOW (ref 37–47)
HGB BLD-MCNC: 11.4 G/DL — LOW (ref 12–16)
IMM GRANULOCYTES NFR BLD AUTO: 0.2 % — SIGNIFICANT CHANGE UP (ref 0.1–0.3)
INR BLD: 1.07 RATIO — SIGNIFICANT CHANGE UP (ref 0.65–1.3)
LYMPHOCYTES # BLD AUTO: 0.73 K/UL — LOW (ref 1.2–3.4)
LYMPHOCYTES # BLD AUTO: 15.4 % — LOW (ref 20.5–51.1)
MCHC RBC-ENTMCNC: 26.3 PG — LOW (ref 27–31)
MCHC RBC-ENTMCNC: 32.9 G/DL — SIGNIFICANT CHANGE UP (ref 32–37)
MCV RBC AUTO: 80 FL — LOW (ref 81–99)
MONOCYTES # BLD AUTO: 0.43 K/UL — SIGNIFICANT CHANGE UP (ref 0.1–0.6)
MONOCYTES NFR BLD AUTO: 9.1 % — SIGNIFICANT CHANGE UP (ref 1.7–9.3)
NEUTROPHILS # BLD AUTO: 3.51 K/UL — SIGNIFICANT CHANGE UP (ref 1.4–6.5)
NEUTROPHILS NFR BLD AUTO: 74 % — SIGNIFICANT CHANGE UP (ref 42.2–75.2)
NRBC BLD AUTO-RTO: 0 /100 WBCS — SIGNIFICANT CHANGE UP (ref 0–0)
PLATELET # BLD AUTO: 153 K/UL — SIGNIFICANT CHANGE UP (ref 130–400)
PMV BLD: 12.5 FL — HIGH (ref 7.4–10.4)
POTASSIUM SERPL-MCNC: 4.2 MMOL/L — SIGNIFICANT CHANGE UP (ref 3.5–5)
POTASSIUM SERPL-SCNC: 4.2 MMOL/L — SIGNIFICANT CHANGE UP (ref 3.5–5)
PROT SERPL-MCNC: 6.9 G/DL — SIGNIFICANT CHANGE UP (ref 6–8)
PROTHROM AB SERPL-ACNC: 12.6 SEC — SIGNIFICANT CHANGE UP (ref 9.95–12.87)
RBC # BLD: 4.34 M/UL — SIGNIFICANT CHANGE UP (ref 4.2–5.4)
RBC # FLD: 14.4 % — SIGNIFICANT CHANGE UP (ref 11.5–14.5)
SODIUM SERPL-SCNC: 132 MMOL/L — LOW (ref 135–146)
WBC # BLD: 4.74 K/UL — LOW (ref 4.8–10.8)
WBC # FLD AUTO: 4.74 K/UL — LOW (ref 4.8–10.8)

## 2025-08-11 PROCEDURE — 85025 COMPLETE CBC W/AUTO DIFF WBC: CPT

## 2025-08-11 PROCEDURE — 99214 OFFICE O/P EST MOD 30 MIN: CPT | Mod: 25

## 2025-08-11 PROCEDURE — 85730 THROMBOPLASTIN TIME PARTIAL: CPT

## 2025-08-11 PROCEDURE — 85610 PROTHROMBIN TIME: CPT

## 2025-08-11 PROCEDURE — 80053 COMPREHEN METABOLIC PANEL: CPT

## 2025-08-11 PROCEDURE — 36415 COLL VENOUS BLD VENIPUNCTURE: CPT

## 2025-08-12 DIAGNOSIS — I83.892 VARICOSE VEINS OF LEFT LOWER EXTREMITY WITH OTHER COMPLICATIONS: ICD-10-CM

## 2025-08-12 DIAGNOSIS — Z01.818 ENCOUNTER FOR OTHER PREPROCEDURAL EXAMINATION: ICD-10-CM

## 2025-08-25 ENCOUNTER — OUTPATIENT (OUTPATIENT)
Dept: OUTPATIENT SERVICES | Facility: HOSPITAL | Age: 75
LOS: 1 days | Discharge: HOME | End: 2025-08-25
Payer: MEDICARE

## 2025-08-25 ENCOUNTER — RESULT REVIEW (OUTPATIENT)
Age: 75
End: 2025-08-25

## 2025-08-25 ENCOUNTER — APPOINTMENT (OUTPATIENT)
Dept: VASCULAR SURGERY | Facility: HOSPITAL | Age: 75
End: 2025-08-25

## 2025-08-25 ENCOUNTER — TRANSCRIPTION ENCOUNTER (OUTPATIENT)
Age: 75
End: 2025-08-25

## 2025-08-25 VITALS
TEMPERATURE: 98 F | OXYGEN SATURATION: 100 % | HEART RATE: 55 BPM | RESPIRATION RATE: 20 BRPM | SYSTOLIC BLOOD PRESSURE: 123 MMHG | DIASTOLIC BLOOD PRESSURE: 74 MMHG

## 2025-08-25 VITALS
SYSTOLIC BLOOD PRESSURE: 128 MMHG | DIASTOLIC BLOOD PRESSURE: 63 MMHG | WEIGHT: 104.94 LBS | TEMPERATURE: 98 F | RESPIRATION RATE: 14 BRPM | HEIGHT: 64 IN | HEART RATE: 55 BPM | OXYGEN SATURATION: 98 %

## 2025-08-25 DIAGNOSIS — Z98.49 CATARACT EXTRACTION STATUS, UNSPECIFIED EYE: Chronic | ICD-10-CM

## 2025-08-25 DIAGNOSIS — I83.892 VARICOSE VEINS OF LEFT LOWER EXTREMITY WITH OTHER COMPLICATIONS: ICD-10-CM

## 2025-08-25 DIAGNOSIS — Z98.890 OTHER SPECIFIED POSTPROCEDURAL STATES: Chronic | ICD-10-CM

## 2025-08-25 PROBLEM — I73.9 PERIPHERAL VASCULAR DISEASE, UNSPECIFIED: Chronic | Status: ACTIVE | Noted: 2025-08-11

## 2025-08-25 PROCEDURE — 88304 TISSUE EXAM BY PATHOLOGIST: CPT

## 2025-08-25 PROCEDURE — 37766 PHLEB VEINS - EXTREM 20+: CPT | Mod: LT

## 2025-08-25 PROCEDURE — 88304 TISSUE EXAM BY PATHOLOGIST: CPT | Mod: 26

## 2025-08-25 RX ORDER — SODIUM CHLORIDE 9 G/1000ML
1000 INJECTION, SOLUTION INTRAVENOUS
Refills: 0 | Status: DISCONTINUED | OUTPATIENT
Start: 2025-08-25 | End: 2025-08-25

## 2025-08-25 RX ORDER — ONDANSETRON HCL/PF 4 MG/2 ML
4 VIAL (ML) INJECTION ONCE
Refills: 0 | Status: DISCONTINUED | OUTPATIENT
Start: 2025-08-25 | End: 2025-08-25

## 2025-08-25 RX ORDER — HYDROMORPHONE/SOD CHLOR,ISO/PF 2 MG/10 ML
1 SYRINGE (ML) INJECTION
Refills: 0 | Status: DISCONTINUED | OUTPATIENT
Start: 2025-08-25 | End: 2025-08-25

## 2025-08-25 RX ORDER — HYDROMORPHONE/SOD CHLOR,ISO/PF 2 MG/10 ML
0.5 SYRINGE (ML) INJECTION
Refills: 0 | Status: DISCONTINUED | OUTPATIENT
Start: 2025-08-25 | End: 2025-08-25

## 2025-08-28 DIAGNOSIS — I83.812 VARICOSE VEINS OF LEFT LOWER EXTREMITY WITH PAIN: ICD-10-CM

## 2025-08-28 DIAGNOSIS — I73.9 PERIPHERAL VASCULAR DISEASE, UNSPECIFIED: ICD-10-CM

## 2025-08-28 DIAGNOSIS — I83.12 VARICOSE VEINS OF LEFT LOWER EXTREMITY WITH INFLAMMATION: ICD-10-CM

## 2025-08-28 DIAGNOSIS — I83.892 VARICOSE VEINS OF LEFT LOWER EXTREMITY WITH OTHER COMPLICATIONS: ICD-10-CM

## 2025-08-29 LAB — SURGICAL PATHOLOGY STUDY: SIGNIFICANT CHANGE UP

## 2025-09-02 RX ORDER — EPINEPHRINE 0.3 MG/.3ML
0.3 INJECTION INTRAMUSCULAR
Qty: 1 | Refills: 0 | Status: ACTIVE | COMMUNITY
Start: 2025-09-02 | End: 1900-01-01

## 2025-09-12 ENCOUNTER — APPOINTMENT (OUTPATIENT)
Dept: ORTHOPEDIC SURGERY | Facility: CLINIC | Age: 75
End: 2025-09-12

## 2025-09-19 ENCOUNTER — APPOINTMENT (OUTPATIENT)
Dept: VASCULAR SURGERY | Facility: CLINIC | Age: 75
End: 2025-09-19

## 2025-09-19 VITALS
SYSTOLIC BLOOD PRESSURE: 126 MMHG | HEART RATE: 55 BPM | BODY MASS INDEX: 17.75 KG/M2 | DIASTOLIC BLOOD PRESSURE: 85 MMHG | WEIGHT: 104 LBS | HEIGHT: 64 IN

## 2025-09-19 DIAGNOSIS — I83.892 VARICOSE VEINS OF LEFT LOWER EXTREMITY WITH OTHER COMPLICATIONS: ICD-10-CM
